# Patient Record
Sex: FEMALE | Race: WHITE | NOT HISPANIC OR LATINO
[De-identification: names, ages, dates, MRNs, and addresses within clinical notes are randomized per-mention and may not be internally consistent; named-entity substitution may affect disease eponyms.]

---

## 2017-01-11 ENCOUNTER — APPOINTMENT (OUTPATIENT)
Dept: RHEUMATOLOGY | Facility: CLINIC | Age: 82
End: 2017-01-11

## 2017-05-22 ENCOUNTER — APPOINTMENT (OUTPATIENT)
Dept: ORTHOPEDIC SURGERY | Facility: CLINIC | Age: 82
End: 2017-05-22

## 2017-05-22 VITALS
DIASTOLIC BLOOD PRESSURE: 73 MMHG | BODY MASS INDEX: 20.22 KG/M2 | HEIGHT: 60 IN | WEIGHT: 103 LBS | HEART RATE: 76 BPM | SYSTOLIC BLOOD PRESSURE: 198 MMHG

## 2017-10-05 ENCOUNTER — APPOINTMENT (OUTPATIENT)
Dept: ORTHOPEDIC SURGERY | Facility: CLINIC | Age: 82
End: 2017-10-05
Payer: MEDICARE

## 2017-10-05 VITALS
DIASTOLIC BLOOD PRESSURE: 80 MMHG | HEIGHT: 60 IN | BODY MASS INDEX: 20.62 KG/M2 | SYSTOLIC BLOOD PRESSURE: 147 MMHG | WEIGHT: 105 LBS | HEART RATE: 69 BPM

## 2017-10-05 DIAGNOSIS — Z78.9 OTHER SPECIFIED HEALTH STATUS: ICD-10-CM

## 2017-10-05 DIAGNOSIS — Z80.9 FAMILY HISTORY OF MALIGNANT NEOPLASM, UNSPECIFIED: ICD-10-CM

## 2017-10-05 DIAGNOSIS — Z87.39 PERSONAL HISTORY OF OTHER DISEASES OF THE MUSCULOSKELETAL SYSTEM AND CONNECTIVE TISSUE: ICD-10-CM

## 2017-10-05 DIAGNOSIS — Z87.891 PERSONAL HISTORY OF NICOTINE DEPENDENCE: ICD-10-CM

## 2017-10-05 DIAGNOSIS — M25.552 PAIN IN LEFT HIP: ICD-10-CM

## 2017-10-05 DIAGNOSIS — Z80.0 FAMILY HISTORY OF MALIGNANT NEOPLASM OF DIGESTIVE ORGANS: ICD-10-CM

## 2017-10-05 DIAGNOSIS — Z60.2 PROBLEMS RELATED TO LIVING ALONE: ICD-10-CM

## 2017-10-05 DIAGNOSIS — Z86.39 PERSONAL HISTORY OF OTHER ENDOCRINE, NUTRITIONAL AND METABOLIC DISEASE: ICD-10-CM

## 2017-10-05 DIAGNOSIS — Z82.62 FAMILY HISTORY OF OSTEOPOROSIS: ICD-10-CM

## 2017-10-05 DIAGNOSIS — Z86.79 PERSONAL HISTORY OF OTHER DISEASES OF THE CIRCULATORY SYSTEM: ICD-10-CM

## 2017-10-05 DIAGNOSIS — Z82.61 FAMILY HISTORY OF ARTHRITIS: ICD-10-CM

## 2017-10-05 DIAGNOSIS — Z86.69 PERSONAL HISTORY OF OTHER DISEASES OF THE NERVOUS SYSTEM AND SENSE ORGANS: ICD-10-CM

## 2017-10-05 DIAGNOSIS — M51.36 OTHER INTERVERTEBRAL DISC DEGENERATION, LUMBAR REGION: ICD-10-CM

## 2017-10-05 PROCEDURE — 73502 X-RAY EXAM HIP UNI 2-3 VIEWS: CPT | Mod: LT

## 2017-10-05 PROCEDURE — 72100 X-RAY EXAM L-S SPINE 2/3 VWS: CPT

## 2017-10-05 PROCEDURE — 99214 OFFICE O/P EST MOD 30 MIN: CPT

## 2017-10-05 RX ORDER — ESZOPICLONE 1 MG/1
1 TABLET, FILM COATED ORAL
Qty: 60 | Refills: 0 | Status: ACTIVE | COMMUNITY
Start: 2017-09-25

## 2017-10-05 RX ORDER — AZELASTINE HYDROCHLORIDE 137 UG/1
0.1 SPRAY, METERED NASAL
Qty: 30 | Refills: 0 | Status: ACTIVE | COMMUNITY
Start: 2017-06-17

## 2017-10-05 RX ORDER — MUPIROCIN 20 MG/G
2 OINTMENT TOPICAL
Qty: 22 | Refills: 0 | Status: ACTIVE | COMMUNITY
Start: 2017-09-05

## 2017-10-05 RX ORDER — TRAMADOL HYDROCHLORIDE AND ACETAMINOPHEN 37.5; 325 MG/1; MG/1
37.5-325 TABLET, FILM COATED ORAL
Qty: 60 | Refills: 0 | Status: ACTIVE | COMMUNITY
Start: 2017-10-05 | End: 1900-01-01

## 2017-10-05 RX ORDER — HYDROCORTISONE 1 %
12 CREAM (GRAM) TOPICAL
Qty: 400 | Refills: 0 | Status: ACTIVE | COMMUNITY
Start: 2017-08-15

## 2017-10-05 RX ORDER — METHENAMINE, SODIUM PHOSPHATE, MONOBASIC, ANHYDROUS, PHENYL SALICYLATE, METHYLENE BLUE AND HYOSCYAMINE SULFATE 118; 40.8; 36; 10; .12 MG/1; MG/1; MG/1; MG/1; MG/1
118 CAPSULE ORAL
Qty: 90 | Refills: 0 | Status: ACTIVE | COMMUNITY
Start: 2017-07-31

## 2017-10-05 RX ORDER — RABEPRAZOLE SODIUM 20 MG/1
20 TABLET, DELAYED RELEASE ORAL
Qty: 30 | Refills: 0 | Status: ACTIVE | COMMUNITY
Start: 2017-08-29

## 2017-10-05 SDOH — SOCIAL STABILITY - SOCIAL INSECURITY: PROBLEMS RELATED TO LIVING ALONE: Z60.2

## 2017-11-13 ENCOUNTER — APPOINTMENT (OUTPATIENT)
Dept: ORTHOPEDIC SURGERY | Facility: CLINIC | Age: 82
End: 2017-11-13
Payer: MEDICARE

## 2017-11-13 DIAGNOSIS — M54.5 LOW BACK PAIN: ICD-10-CM

## 2017-11-13 DIAGNOSIS — S22.000A WEDGE COMPRESSION FRACTURE OF UNSPECIFIED THORACIC VERTEBRA, INITIAL ENCOUNTER FOR CLOSED FRACTURE: ICD-10-CM

## 2017-11-13 PROCEDURE — 99214 OFFICE O/P EST MOD 30 MIN: CPT

## 2017-11-13 PROCEDURE — 72070 X-RAY EXAM THORAC SPINE 2VWS: CPT

## 2017-12-11 ENCOUNTER — APPOINTMENT (OUTPATIENT)
Dept: ORTHOPEDIC SURGERY | Facility: CLINIC | Age: 82
End: 2017-12-11
Payer: MEDICARE

## 2017-12-11 VITALS — WEIGHT: 105 LBS | BODY MASS INDEX: 20.62 KG/M2 | HEIGHT: 60 IN

## 2017-12-11 DIAGNOSIS — S32.009A UNSPECIFIED FRACTURE OF UNSPECIFIED LUMBAR VERTEBRA, INITIAL ENCOUNTER FOR CLOSED FRACTURE: ICD-10-CM

## 2017-12-11 PROCEDURE — 99213 OFFICE O/P EST LOW 20 MIN: CPT

## 2017-12-11 PROCEDURE — 72080 X-RAY EXAM THORACOLMB 2/> VW: CPT

## 2018-05-29 ENCOUNTER — OUTPATIENT (OUTPATIENT)
Dept: OUTPATIENT SERVICES | Facility: HOSPITAL | Age: 83
LOS: 1 days | End: 2018-05-29

## 2018-05-29 VITALS
OXYGEN SATURATION: 98 % | HEART RATE: 72 BPM | WEIGHT: 108.91 LBS | SYSTOLIC BLOOD PRESSURE: 122 MMHG | DIASTOLIC BLOOD PRESSURE: 80 MMHG | HEIGHT: 57 IN | RESPIRATION RATE: 16 BRPM | TEMPERATURE: 98 F

## 2018-05-29 DIAGNOSIS — G56.02 CARPAL TUNNEL SYNDROME, LEFT UPPER LIMB: ICD-10-CM

## 2018-05-29 DIAGNOSIS — M65.342 TRIGGER FINGER, LEFT RING FINGER: ICD-10-CM

## 2018-05-29 DIAGNOSIS — Z90.89 ACQUIRED ABSENCE OF OTHER ORGANS: Chronic | ICD-10-CM

## 2018-05-29 DIAGNOSIS — I10 ESSENTIAL (PRIMARY) HYPERTENSION: ICD-10-CM

## 2018-05-29 DIAGNOSIS — Z98.890 OTHER SPECIFIED POSTPROCEDURAL STATES: Chronic | ICD-10-CM

## 2018-05-29 LAB
BUN SERPL-MCNC: 24 MG/DL — HIGH (ref 7–23)
CALCIUM SERPL-MCNC: 9.4 MG/DL — SIGNIFICANT CHANGE UP (ref 8.4–10.5)
CHLORIDE SERPL-SCNC: 104 MMOL/L — SIGNIFICANT CHANGE UP (ref 98–107)
CO2 SERPL-SCNC: 23 MMOL/L — SIGNIFICANT CHANGE UP (ref 22–31)
CREAT SERPL-MCNC: 1.25 MG/DL — SIGNIFICANT CHANGE UP (ref 0.5–1.3)
GLUCOSE SERPL-MCNC: 81 MG/DL — SIGNIFICANT CHANGE UP (ref 70–99)
HCT VFR BLD CALC: 33.1 % — LOW (ref 34.5–45)
HGB BLD-MCNC: 10.6 G/DL — LOW (ref 11.5–15.5)
MCHC RBC-ENTMCNC: 30 PG — SIGNIFICANT CHANGE UP (ref 27–34)
MCHC RBC-ENTMCNC: 32 % — SIGNIFICANT CHANGE UP (ref 32–36)
MCV RBC AUTO: 93.8 FL — SIGNIFICANT CHANGE UP (ref 80–100)
NRBC # FLD: 0 — SIGNIFICANT CHANGE UP
PLATELET # BLD AUTO: 367 K/UL — SIGNIFICANT CHANGE UP (ref 150–400)
PMV BLD: 11.1 FL — SIGNIFICANT CHANGE UP (ref 7–13)
POTASSIUM SERPL-MCNC: 5 MMOL/L — SIGNIFICANT CHANGE UP (ref 3.5–5.3)
POTASSIUM SERPL-SCNC: 5 MMOL/L — SIGNIFICANT CHANGE UP (ref 3.5–5.3)
RBC # BLD: 3.53 M/UL — LOW (ref 3.8–5.2)
RBC # FLD: 12.6 % — SIGNIFICANT CHANGE UP (ref 10.3–14.5)
SODIUM SERPL-SCNC: 141 MMOL/L — SIGNIFICANT CHANGE UP (ref 135–145)
WBC # BLD: 6.75 K/UL — SIGNIFICANT CHANGE UP (ref 3.8–10.5)
WBC # FLD AUTO: 6.75 K/UL — SIGNIFICANT CHANGE UP (ref 3.8–10.5)

## 2018-05-29 NOTE — H&P PST ADULT - NEGATIVE BREAST SYMPTOMS
no nipple discharge R/no breast tenderness R/no nipple discharge L/no breast tenderness L/no breast lump R/no breast lump L

## 2018-05-29 NOTE — H&P PST ADULT - NEGATIVE OPHTHALMOLOGIC SYMPTOMS
no diplopia/no photophobia/no pain R/no blurred vision R/no blurred vision L/no pain L/no loss of vision L/no loss of vision R

## 2018-05-29 NOTE — H&P PST ADULT - GENERAL COMMENTS
Treated with Tamiflu for Influenza B three weeks ago. Pt denies presence of fever with flu diagnosis Treated with Tamiflu for Influenza B three weeks ago. Pt denies presence of fever with flu diagnosis.

## 2018-05-29 NOTE — H&P PST ADULT - NEGATIVE CARDIOVASCULAR SYMPTOMS
no palpitations/no chest pain/no peripheral edema/no paroxysmal nocturnal dyspnea/no dyspnea on exertion

## 2018-05-29 NOTE — H&P PST ADULT - NSANTHOSAYNRD_GEN_A_CORE
No. SERGIO screening performed.  STOP BANG Legend: 0-2 = LOW Risk; 3-4 = INTERMEDIATE Risk; 5-8 = HIGH Risk

## 2018-05-29 NOTE — H&P PST ADULT - MUSCULOSKELETAL
details… detailed exam decreased ROM due to pain/diminished strength/of upper extremities/no calf tenderness

## 2018-05-29 NOTE — H&P PST ADULT - PROBLEM SELECTOR PLAN 1
Scheduled for Left Endoscopic Carpal Tunnel Release and Thumb and Ring Finger Trigger Release on 6/4/2018  Preop instructions given, pt verbalized understanding   Chlorhexidine wash provided  Pt can take prescribed GI prophylaxis Nexium AM of surgery with a sip of water

## 2018-05-29 NOTE — H&P PST ADULT - NEGATIVE NEUROLOGICAL SYMPTOMS
no syncope/no vertigo/no focal seizures/no loss of sensation/no headache/no confusion/no difficulty walking/no loss of consciousness/no paresthesias/no hemiparesis/no facial palsy/no transient paralysis/no weakness/no tremors/no generalized seizures

## 2018-05-29 NOTE — H&P PST ADULT - PMH
Arthritis  generalized, all joints  Carpal tunnel syndrome, left upper limb    Environmental allergies    HLD (hyperlipidemia)    HTN (hypertension)    Muscle cramp    Neuropathy    Trigger finger of left thumb    Trigger finger, left ring finger

## 2018-05-29 NOTE — H&P PST ADULT - LYMPHATIC
posterior cervical L/supraclavicular L/supraclavicular R/posterior cervical R/anterior cervical R/anterior cervical L

## 2018-05-29 NOTE — H&P PST ADULT - NEGATIVE ENMT SYMPTOMS
"I have an itch inside my ear"/no sinus symptoms/no hearing difficulty/no vertigo/no ear pain/no tinnitus

## 2018-05-29 NOTE — H&P PST ADULT - FAMILY HISTORY
Child  Still living? Unknown  Family history of lymphoma, Age at diagnosis: Age Unknown     Mother  Still living? Unknown  Family history of colon cancer in mother, Age at diagnosis: Age Unknown

## 2018-05-29 NOTE — H&P PST ADULT - NEGATIVE GENERAL SYMPTOMS
no sweating/no weight gain/no polyuria/no chills/no fatigue/no weight loss/no polyphagia/no polydipsia/no fever

## 2018-05-29 NOTE — H&P PST ADULT - RS GEN PE MLT RESP DETAILS PC
respirations non-labored/good air movement/breath sounds equal/airway patent/no chest wall tenderness/clear to auscultation bilaterally

## 2018-05-29 NOTE — H&P PST ADULT - HISTORY OF PRESENT ILLNESS
89 y/o female with PMH of HTN and HLD presents to PST for preoperative evaluation with dx of carpal tunnel syndrome of left upper limb and trigger finger of left ring finger and thumb. Pt reports since left wrist fracture in 2016, she developed tingling to the fingers of her left hand. Scheduled for Left Endoscopic Carpal Tunnel Release and Thumb and Ring Finger Trigger Release on 6/4/2018. Pt reports progressive pain and tingling to fingers that radiates down to the palm. Pt states pain affects ability to perform certain activities (opening doors, jars) 89 y/o female with PMH of HTN and HLD presents to PST for preoperative evaluation with dx of carpal tunnel syndrome of left upper limb and trigger finger of left ring finger and thumb. Pt reports since left wrist fracture in 2016, she developed tingling to the fingers of her left hand. Scheduled for Left Endoscopic Carpal Tunnel Release and Thumb and Ring Finger Trigger Release on 6/4/2018. Pt reports progressive pain and tingling to fingers that radiates down the palm. She states pain affects ability to perform certain activities (opening doors, jars).

## 2018-06-01 ENCOUNTER — APPOINTMENT (OUTPATIENT)
Dept: ORTHOPEDIC SURGERY | Facility: CLINIC | Age: 83
End: 2018-06-01
Payer: MEDICARE

## 2018-06-01 DIAGNOSIS — S22.009A UNSPECIFIED FRACTURE OF UNSPECIFIED THORACIC VERTEBRA, INITIAL ENCOUNTER FOR CLOSED FRACTURE: ICD-10-CM

## 2018-06-01 PROCEDURE — 99214 OFFICE O/P EST MOD 30 MIN: CPT

## 2018-06-01 PROCEDURE — 72070 X-RAY EXAM THORAC SPINE 2VWS: CPT

## 2018-06-03 ENCOUNTER — TRANSCRIPTION ENCOUNTER (OUTPATIENT)
Age: 83
End: 2018-06-03

## 2018-06-04 ENCOUNTER — OUTPATIENT (OUTPATIENT)
Dept: OUTPATIENT SERVICES | Facility: HOSPITAL | Age: 83
LOS: 1 days | Discharge: HOME | End: 2018-06-04

## 2018-06-04 VITALS
DIASTOLIC BLOOD PRESSURE: 70 MMHG | SYSTOLIC BLOOD PRESSURE: 170 MMHG | TEMPERATURE: 98 F | RESPIRATION RATE: 16 BRPM | HEART RATE: 65 BPM | OXYGEN SATURATION: 98 %

## 2018-06-04 VITALS
SYSTOLIC BLOOD PRESSURE: 212 MMHG | OXYGEN SATURATION: 100 % | WEIGHT: 108.91 LBS | HEIGHT: 57 IN | TEMPERATURE: 98 F | HEART RATE: 74 BPM | RESPIRATION RATE: 16 BRPM | DIASTOLIC BLOOD PRESSURE: 70 MMHG

## 2018-06-04 DIAGNOSIS — Z98.890 OTHER SPECIFIED POSTPROCEDURAL STATES: Chronic | ICD-10-CM

## 2018-06-04 DIAGNOSIS — Z90.89 ACQUIRED ABSENCE OF OTHER ORGANS: Chronic | ICD-10-CM

## 2018-06-04 DIAGNOSIS — M65.342 TRIGGER FINGER, LEFT RING FINGER: ICD-10-CM

## 2018-06-04 NOTE — BRIEF OPERATIVE NOTE - PROCEDURE
<<-----Click on this checkbox to enter Procedure Trigger finger release of left hand  06/04/2018  THUMB AND RING FINGER  Active  RROSSI  Carpal tunnel release, endoscopic  06/04/2018  LEFT  Active  RROSSI

## 2018-06-04 NOTE — ASU DISCHARGE PLAN (ADULT/PEDIATRIC). - NOTIFY
Fever greater than 101/Inability to Tolerate Liquids or Foods/Numbness, tingling/Excessive Diarrhea/Pain not relieved by Medications/Unable to Urinate/Numbness, color, or temperature change to extremity/Persistent Nausea and Vomiting/Swelling that continues/Bleeding that does not stop

## 2018-06-04 NOTE — BRIEF OPERATIVE NOTE - PRE-OP DX
Carpal tunnel syndrome on left  06/04/2018    Active  Austyn Cortez  Trigger finger of left thumb  06/04/2018    Active  Austyn Cortez  Trigger finger, left ring finger  06/04/2018    Active  Austyn Cortez

## 2018-07-17 PROBLEM — M19.90 UNSPECIFIED OSTEOARTHRITIS, UNSPECIFIED SITE: Chronic | Status: ACTIVE | Noted: 2018-05-29

## 2018-07-20 PROBLEM — G62.9 POLYNEUROPATHY, UNSPECIFIED: Chronic | Status: ACTIVE | Noted: 2018-05-29

## 2018-07-20 PROBLEM — I10 ESSENTIAL (PRIMARY) HYPERTENSION: Chronic | Status: ACTIVE | Noted: 2018-05-29

## 2018-07-20 PROBLEM — M65.312 TRIGGER THUMB, LEFT THUMB: Chronic | Status: ACTIVE | Noted: 2018-05-29

## 2018-07-20 PROBLEM — R25.2 CRAMP AND SPASM: Chronic | Status: ACTIVE | Noted: 2018-05-29

## 2018-07-20 PROBLEM — G56.02 CARPAL TUNNEL SYNDROME, LEFT UPPER LIMB: Chronic | Status: ACTIVE | Noted: 2018-05-29

## 2018-07-20 PROBLEM — E78.5 HYPERLIPIDEMIA, UNSPECIFIED: Chronic | Status: ACTIVE | Noted: 2018-05-29

## 2018-07-20 PROBLEM — M65.342 TRIGGER FINGER, LEFT RING FINGER: Chronic | Status: ACTIVE | Noted: 2018-05-29

## 2018-07-20 PROBLEM — Z91.09 OTHER ALLERGY STATUS, OTHER THAN TO DRUGS AND BIOLOGICAL SUBSTANCES: Chronic | Status: ACTIVE | Noted: 2018-05-29

## 2018-07-22 PROBLEM — M25.552 PAIN IN JOINT INVOLVING PELVIC REGION AND THIGH, LEFT: Status: ACTIVE | Noted: 2017-10-05

## 2018-07-22 PROBLEM — Z80.0 FAMILY HISTORY OF COLON CANCER: Status: ACTIVE | Noted: 2017-10-05

## 2018-08-20 ENCOUNTER — APPOINTMENT (OUTPATIENT)
Dept: DERMATOLOGY | Facility: CLINIC | Age: 83
End: 2018-08-20
Payer: MEDICARE

## 2018-08-20 VITALS
HEIGHT: 56 IN | SYSTOLIC BLOOD PRESSURE: 122 MMHG | DIASTOLIC BLOOD PRESSURE: 80 MMHG | WEIGHT: 106 LBS | BODY MASS INDEX: 23.84 KG/M2

## 2018-08-20 DIAGNOSIS — L81.4 OTHER MELANIN HYPERPIGMENTATION: ICD-10-CM

## 2018-08-20 DIAGNOSIS — Z12.83 ENCOUNTER FOR SCREENING FOR MALIGNANT NEOPLASM OF SKIN: ICD-10-CM

## 2018-08-20 DIAGNOSIS — D18.01 HEMANGIOMA OF SKIN AND SUBCUTANEOUS TISSUE: ICD-10-CM

## 2018-08-20 DIAGNOSIS — Z80.9 FAMILY HISTORY OF MALIGNANT NEOPLASM, UNSPECIFIED: ICD-10-CM

## 2018-08-20 DIAGNOSIS — L82.1 OTHER SEBORRHEIC KERATOSIS: ICD-10-CM

## 2018-08-20 DIAGNOSIS — Z91.89 OTHER SPECIFIED PERSONAL RISK FACTORS, NOT ELSEWHERE CLASSIFIED: ICD-10-CM

## 2018-08-20 PROCEDURE — 17110 DESTRUCTION B9 LES UP TO 14: CPT

## 2018-08-20 PROCEDURE — 99203 OFFICE O/P NEW LOW 30 MIN: CPT | Mod: 25

## 2018-12-27 NOTE — H&P PST ADULT - REASON FOR ADMISSION
"Patient presents to the clinic for follow up with ongoing hypertension concerns.      Previous A1C is at goal of <8  Lab Results   Component Value Date    A1C 7.5 11/21/2018    A1C 8.0 08/09/2018    A1C 7.8 05/03/2018     Urine microalbumin:creatine: n/a  Foot exam unknown-declines today  Eye exam 2/2018    Tobacco User no  Patient is on a daily aspirin  Patient is on a Statin.  Blood pressure today of:     BP Readings from Last 1 Encounters:   12/27/18 162/74      is not at the goal of <139/89 for diabetics.    Chief Complaint   Patient presents with     Clinic Care Coordination - Follow-up       Initial /74 (BP Location: Right arm, Patient Position: Sitting, Cuff Size: Adult Regular)   Pulse 72   Temp 98.2  F (36.8  C) (Tympanic)   Wt 91.2 kg (201 lb)   BMI 29.47 kg/m   Estimated body mass index is 29.47 kg/m  as calculated from the following:    Height as of 9/19/17: 1.759 m (5' 9.25\").    Weight as of this encounter: 91.2 kg (201 lb).  Medication Reconciliation: complete    Barbara Connor LPN    "
"I'm having carpal tunnel release of my left hand"

## 2019-10-02 PROBLEM — Z60.2 PERSON LIVING ALONE: Status: ACTIVE | Noted: 2017-05-22

## 2020-04-24 ENCOUNTER — NON-APPOINTMENT (OUTPATIENT)
Age: 85
End: 2020-04-24

## 2020-04-24 ENCOUNTER — APPOINTMENT (OUTPATIENT)
Dept: OPHTHALMOLOGY | Facility: CLINIC | Age: 85
End: 2020-04-24
Payer: MEDICARE

## 2020-04-24 PROCEDURE — 99441: CPT | Mod: CR

## 2022-01-15 ENCOUNTER — INPATIENT (INPATIENT)
Facility: HOSPITAL | Age: 87
LOS: 3 days | Discharge: HOME | End: 2022-01-19
Attending: STUDENT IN AN ORGANIZED HEALTH CARE EDUCATION/TRAINING PROGRAM | Admitting: STUDENT IN AN ORGANIZED HEALTH CARE EDUCATION/TRAINING PROGRAM
Payer: MEDICARE

## 2022-01-15 VITALS
TEMPERATURE: 98 F | DIASTOLIC BLOOD PRESSURE: 71 MMHG | HEIGHT: 57 IN | HEART RATE: 80 BPM | RESPIRATION RATE: 18 BRPM | SYSTOLIC BLOOD PRESSURE: 170 MMHG | OXYGEN SATURATION: 97 %

## 2022-01-15 DIAGNOSIS — Z98.890 OTHER SPECIFIED POSTPROCEDURAL STATES: Chronic | ICD-10-CM

## 2022-01-15 DIAGNOSIS — Z90.89 ACQUIRED ABSENCE OF OTHER ORGANS: Chronic | ICD-10-CM

## 2022-01-15 LAB
ALBUMIN SERPL ELPH-MCNC: 4.1 G/DL — SIGNIFICANT CHANGE UP (ref 3.3–5)
ALP SERPL-CCNC: 196 U/L — HIGH (ref 40–120)
ALT FLD-CCNC: 897 U/L — HIGH (ref 4–33)
ANION GAP SERPL CALC-SCNC: 16 MMOL/L — HIGH (ref 7–14)
AST SERPL-CCNC: 733 U/L — HIGH (ref 4–32)
BASOPHILS # BLD AUTO: 0.03 K/UL — SIGNIFICANT CHANGE UP (ref 0–0.2)
BASOPHILS NFR BLD AUTO: 0.2 % — SIGNIFICANT CHANGE UP (ref 0–2)
BILIRUB SERPL-MCNC: 2.2 MG/DL — HIGH (ref 0.2–1.2)
BUN SERPL-MCNC: 28 MG/DL — HIGH (ref 7–23)
CALCIUM SERPL-MCNC: 8.8 MG/DL — SIGNIFICANT CHANGE UP (ref 8.4–10.5)
CHLORIDE SERPL-SCNC: 95 MMOL/L — LOW (ref 98–107)
CO2 SERPL-SCNC: 19 MMOL/L — LOW (ref 22–31)
CREAT SERPL-MCNC: 1.52 MG/DL — HIGH (ref 0.5–1.3)
EOSINOPHIL # BLD AUTO: 0 K/UL — SIGNIFICANT CHANGE UP (ref 0–0.5)
EOSINOPHIL NFR BLD AUTO: 0 % — SIGNIFICANT CHANGE UP (ref 0–6)
GLUCOSE SERPL-MCNC: 148 MG/DL — HIGH (ref 70–99)
HCT VFR BLD CALC: 27.1 % — LOW (ref 34.5–45)
HGB BLD-MCNC: 9 G/DL — LOW (ref 11.5–15.5)
IANC: 14.41 K/UL — HIGH (ref 1.5–8.5)
IMM GRANULOCYTES NFR BLD AUTO: 0.7 % — SIGNIFICANT CHANGE UP (ref 0–1.5)
LACTATE BLDV-MCNC: 1.6 MMOL/L — SIGNIFICANT CHANGE UP (ref 0.5–2)
LYMPHOCYTES # BLD AUTO: 0.64 K/UL — LOW (ref 1–3.3)
LYMPHOCYTES # BLD AUTO: 4 % — LOW (ref 13–44)
MAGNESIUM SERPL-MCNC: 1.7 MG/DL — SIGNIFICANT CHANGE UP (ref 1.6–2.6)
MCHC RBC-ENTMCNC: 30.5 PG — SIGNIFICANT CHANGE UP (ref 27–34)
MCHC RBC-ENTMCNC: 33.2 GM/DL — SIGNIFICANT CHANGE UP (ref 32–36)
MCV RBC AUTO: 91.9 FL — SIGNIFICANT CHANGE UP (ref 80–100)
MONOCYTES # BLD AUTO: 0.89 K/UL — SIGNIFICANT CHANGE UP (ref 0–0.9)
MONOCYTES NFR BLD AUTO: 5.5 % — SIGNIFICANT CHANGE UP (ref 2–14)
NEUTROPHILS # BLD AUTO: 14.41 K/UL — HIGH (ref 1.8–7.4)
NEUTROPHILS NFR BLD AUTO: 89.6 % — HIGH (ref 43–77)
NRBC # BLD: 0 /100 WBCS — SIGNIFICANT CHANGE UP
NRBC # FLD: 0 K/UL — SIGNIFICANT CHANGE UP
NT-PROBNP SERPL-SCNC: HIGH PG/ML
PHOSPHATE SERPL-MCNC: 3.1 MG/DL — SIGNIFICANT CHANGE UP (ref 2.5–4.5)
PLATELET # BLD AUTO: 218 K/UL — SIGNIFICANT CHANGE UP (ref 150–400)
POTASSIUM SERPL-MCNC: 4.3 MMOL/L — SIGNIFICANT CHANGE UP (ref 3.5–5.3)
POTASSIUM SERPL-SCNC: 4.3 MMOL/L — SIGNIFICANT CHANGE UP (ref 3.5–5.3)
PROT SERPL-MCNC: 6.5 G/DL — SIGNIFICANT CHANGE UP (ref 6–8.3)
RBC # BLD: 2.95 M/UL — LOW (ref 3.8–5.2)
RBC # FLD: 12.8 % — SIGNIFICANT CHANGE UP (ref 10.3–14.5)
SODIUM SERPL-SCNC: 130 MMOL/L — LOW (ref 135–145)
TROPONIN T, HIGH SENSITIVITY RESULT: 188 NG/L — CRITICAL HIGH
WBC # BLD: 16.09 K/UL — HIGH (ref 3.8–10.5)
WBC # FLD AUTO: 16.09 K/UL — HIGH (ref 3.8–10.5)

## 2022-01-15 PROCEDURE — 76705 ECHO EXAM OF ABDOMEN: CPT | Mod: 26

## 2022-01-15 PROCEDURE — 72125 CT NECK SPINE W/O DYE: CPT | Mod: 26,MA

## 2022-01-15 PROCEDURE — 72128 CT CHEST SPINE W/O DYE: CPT | Mod: 26,MA

## 2022-01-15 PROCEDURE — 74176 CT ABD & PELVIS W/O CONTRAST: CPT | Mod: 26,MD

## 2022-01-15 PROCEDURE — 70450 CT HEAD/BRAIN W/O DYE: CPT | Mod: 26,MA

## 2022-01-15 PROCEDURE — 93010 ELECTROCARDIOGRAM REPORT: CPT | Mod: GC

## 2022-01-15 PROCEDURE — 71045 X-RAY EXAM CHEST 1 VIEW: CPT | Mod: 26

## 2022-01-15 PROCEDURE — 99285 EMERGENCY DEPT VISIT HI MDM: CPT | Mod: 25,GC

## 2022-01-15 RX ORDER — PIPERACILLIN AND TAZOBACTAM 4; .5 G/20ML; G/20ML
3.38 INJECTION, POWDER, LYOPHILIZED, FOR SOLUTION INTRAVENOUS ONCE
Refills: 0 | Status: COMPLETED | OUTPATIENT
Start: 2022-01-15 | End: 2022-01-15

## 2022-01-15 RX ORDER — VANCOMYCIN HCL 1 G
1000 VIAL (EA) INTRAVENOUS ONCE
Refills: 0 | Status: COMPLETED | OUTPATIENT
Start: 2022-01-15 | End: 2022-01-15

## 2022-01-15 RX ORDER — SODIUM CHLORIDE 9 MG/ML
1000 INJECTION INTRAMUSCULAR; INTRAVENOUS; SUBCUTANEOUS ONCE
Refills: 0 | Status: COMPLETED | OUTPATIENT
Start: 2022-01-15 | End: 2022-01-15

## 2022-01-15 RX ORDER — ACETAMINOPHEN 500 MG
650 TABLET ORAL ONCE
Refills: 0 | Status: COMPLETED | OUTPATIENT
Start: 2022-01-15 | End: 2022-01-15

## 2022-01-15 RX ADMIN — SODIUM CHLORIDE 1000 MILLILITER(S): 9 INJECTION INTRAMUSCULAR; INTRAVENOUS; SUBCUTANEOUS at 22:14

## 2022-01-15 RX ADMIN — Medication 250 MILLIGRAM(S): at 23:00

## 2022-01-15 RX ADMIN — Medication 650 MILLIGRAM(S): at 22:25

## 2022-01-15 RX ADMIN — SODIUM CHLORIDE 1000 MILLILITER(S): 9 INJECTION INTRAMUSCULAR; INTRAVENOUS; SUBCUTANEOUS at 23:42

## 2022-01-15 RX ADMIN — PIPERACILLIN AND TAZOBACTAM 200 GRAM(S): 4; .5 INJECTION, POWDER, LYOPHILIZED, FOR SOLUTION INTRAVENOUS at 22:25

## 2022-01-15 NOTE — ED PROVIDER NOTE - ATTENDING CONTRIBUTION TO CARE
89F w h/o HTN p/w nausea, fatigue, decreased PO intake, worsening over the past week. States symptoms the day after she bumped her head on a cabinet. Denies numbness, focal weakness, vision changes, vomiting, fevers, cough, dysuria, chest pain. Endorses frequency. Denies sick contacts or uri symptoms.     Except as documented in the HPI,  all other systems are negative    CONSTITUTIONAL: NAD, awake, alert  HEAD: Normocephalic; atraumatic  ENMT: External appears normal, MMM  NECK: no tenderness, FROM  CARD: Normal Sl, S2; no audible murmurs  RESP: normal wob, lungs ctab  ABD: soft, non-distended; non-tender, left cva tenderness  MSK: no spinal tenderness, no step off  edema, normal ROM in all four extremities  SKIN: Warm, dry, no rashes  NEURO: aaox3, moving all extremities spontaneously, 5/5 strength throughout, sensation grossly intact, no drift, no droop    89F w/ 1 week of fatigue, frequency, decreased PO intake, low suspicion for intracranial injury but given chronicity w/ symptoms will obtain head ct, will check tsh, conduct infectious symptoms, reassess

## 2022-01-15 NOTE — ED ADULT TRIAGE NOTE - CHIEF COMPLAINT QUOTE
pt c/o n/v since heading head last Saturday. Pt denies any associated symptoms including weakness, vision changes, chest/abdominal/head pain. Denies blood thinner use. Received 4 mg zofran with EMS, arrives with 20 G PIV L AC. pt c/o n/v since heading head on door when standing last Saturday, denies falling. Pt denies any associated symptoms including weakness, dizziness, vision changes, chest/abdominal/head pain. Denies blood thinner use. Received 4 mg zofran with EMS, arrives with 20 G PIV L AC. pt c/o n/v since hitting head on door when standing last Saturday, denies falling. Pt denies any associated symptoms including weakness, dizziness, vision changes, chest/abdominal/head pain. Denies blood thinner use. Received 4 mg zofran with EMS, arrives with 20 G PIV L AC.

## 2022-01-15 NOTE — ED PROVIDER NOTE - CARE PLAN
1 Principal Discharge DX:	Acute cystitis  Secondary Diagnosis:	DIMITRI (acute kidney injury)   Principal Discharge DX:	Acute cystitis  Secondary Diagnosis:	DIMITRI (acute kidney injury)  Secondary Diagnosis:	Rhabdomyolysis  Secondary Diagnosis:	Cyst of left kidney  Secondary Diagnosis:	Transaminitis

## 2022-01-15 NOTE — ED PROVIDER NOTE - PROGRESS NOTE DETAILS
Carlota Banks MD, PGY-2: labs significant for transaminitis 700/800s, tylenol was ordered and given for fever prior to CMP result. Carlota Banks MD, PGY-2: pt clinically stable; signout given to hospitalist.

## 2022-01-15 NOTE — ED ADULT TRIAGE NOTE - HEIGHT IN CM
144.78

## 2022-01-15 NOTE — ED PROVIDER NOTE - CLINICAL SUMMARY MEDICAL DECISION MAKING FREE TEXT BOX
Carlota Banks MD, PGY-2: 89YO F hx HTN p/w nausea, weakness, recent head trauma 6d prior, decreased PO intake. VSS, PE no abnormalities on neuro exam. suspect weakness s/t dehydration, consider ACS, infection. consider intracranial hemorrhage given trauma and vomiting. plan for infectious w/u, basic labs, ct head.

## 2022-01-15 NOTE — ED PROVIDER NOTE - PHYSICAL EXAMINATION
Gen: WDWN, NAD  HEENT: EOMI, no nasal discharge, mucous membranes moist  CV: RRR, +S1/S2, no M/R/G, 2+ radial pulses b/l  Resp: CTAB, no W/R/R, no accessory muscle use, no increased work of breathing  GI: Abdomen soft non-distended, NTTP  MSK: No open wounds, no bruising, no LE edema  Neuro: A&Ox4, following commands, moving all four extremities spontaneously. CN3-12 intact, EOMI. PERRLA, 5/5 strength in b/l UE/LE.  Sensation intact bl in UE/LE.   Psych: appropriate mood

## 2022-01-15 NOTE — ED ADULT NURSE NOTE - OBJECTIVE STATEMENT
Patient received with the complaints of nausea since 7 days. Patient denies any dizziness/abdominal pain/headache/change in vision, speech or gait. Patient in no distress. Medications given as ordered. Patient tolerated well. Nursing care continues

## 2022-01-15 NOTE — ED PROVIDER NOTE - NS ED ROS FT
Gen: Denies fevers  CV: Denies chest pain  Resp: Denies SOB, cough  GI: + nausea, vomiting  : Denies dysuria  all other ROS negative unless indicated in HPI

## 2022-01-15 NOTE — ED PROVIDER NOTE - NSICDXFAMILYHX_GEN_ALL_CORE_FT
FAMILY HISTORY:  Mother  Still living? Unknown  Family history of colon cancer in mother, Age at diagnosis: Age Unknown    Child  Still living? Unknown  Family history of lymphoma, Age at diagnosis: Age Unknown

## 2022-01-15 NOTE — ED PROVIDER NOTE - OBJECTIVE STATEMENT
89YO F hx HTN p/w nausea, weakness. nausea onset this afternoon, resolved after zofran from EMS. denied abd pain. endorses episode of b/l arm and leg weakness intake this am, improved after po intake (decreased day prior). endorses trauma 6d prior, hit head on cabinet, at that time denied LOC, n/v, blurry vision, focal neuro deficits. denies fevers, cough, sob, dysuria.

## 2022-01-15 NOTE — ED PROVIDER NOTE - NSICDXPASTMEDICALHX_GEN_ALL_CORE_FT
PAST MEDICAL HISTORY:  Arthritis generalized, all joints    Carpal tunnel syndrome, left upper limb     Environmental allergies     HLD (hyperlipidemia)     HTN (hypertension)     Muscle cramp     Neuropathy     Trigger finger of left thumb     Trigger finger, left ring finger

## 2022-01-15 NOTE — ED ADULT NURSE NOTE - CHIEF COMPLAINT QUOTE
pt c/o n/v since heading head on door when standing last Saturday, denies falling. Pt denies any associated symptoms including weakness, dizziness, vision changes, chest/abdominal/head pain. Denies blood thinner use. Received 4 mg zofran with EMS, arrives with 20 G PIV L AC.

## 2022-01-16 DIAGNOSIS — M62.82 RHABDOMYOLYSIS: ICD-10-CM

## 2022-01-16 DIAGNOSIS — I10 ESSENTIAL (PRIMARY) HYPERTENSION: ICD-10-CM

## 2022-01-16 DIAGNOSIS — R77.8 OTHER SPECIFIED ABNORMALITIES OF PLASMA PROTEINS: ICD-10-CM

## 2022-01-16 DIAGNOSIS — N28.1 CYST OF KIDNEY, ACQUIRED: ICD-10-CM

## 2022-01-16 DIAGNOSIS — N30.00 ACUTE CYSTITIS WITHOUT HEMATURIA: ICD-10-CM

## 2022-01-16 DIAGNOSIS — D64.9 ANEMIA, UNSPECIFIED: ICD-10-CM

## 2022-01-16 DIAGNOSIS — N39.0 URINARY TRACT INFECTION, SITE NOT SPECIFIED: ICD-10-CM

## 2022-01-16 DIAGNOSIS — R74.01 ELEVATION OF LEVELS OF LIVER TRANSAMINASE LEVELS: ICD-10-CM

## 2022-01-16 DIAGNOSIS — Z29.9 ENCOUNTER FOR PROPHYLACTIC MEASURES, UNSPECIFIED: ICD-10-CM

## 2022-01-16 DIAGNOSIS — N17.9 ACUTE KIDNEY FAILURE, UNSPECIFIED: ICD-10-CM

## 2022-01-16 LAB
ALBUMIN SERPL ELPH-MCNC: 4 G/DL — SIGNIFICANT CHANGE UP (ref 3.3–5)
ALP SERPL-CCNC: 163 U/L — HIGH (ref 40–120)
ALT FLD-CCNC: 659 U/L — HIGH (ref 4–33)
ANION GAP SERPL CALC-SCNC: 11 MMOL/L — SIGNIFICANT CHANGE UP (ref 7–14)
ANION GAP SERPL CALC-SCNC: 14 MMOL/L — SIGNIFICANT CHANGE UP (ref 7–14)
APPEARANCE UR: CLEAR — SIGNIFICANT CHANGE UP
AST SERPL-CCNC: 405 U/L — HIGH (ref 4–32)
BACTERIA # UR AUTO: ABNORMAL
BASE EXCESS BLDV CALC-SCNC: -6.2 MMOL/L — LOW (ref -2–3)
BASOPHILS # BLD AUTO: 0.04 K/UL — SIGNIFICANT CHANGE UP (ref 0–0.2)
BASOPHILS NFR BLD AUTO: 0.3 % — SIGNIFICANT CHANGE UP (ref 0–2)
BILIRUB SERPL-MCNC: 1 MG/DL — SIGNIFICANT CHANGE UP (ref 0.2–1.2)
BILIRUB UR-MCNC: NEGATIVE — SIGNIFICANT CHANGE UP
BUN SERPL-MCNC: 22 MG/DL — SIGNIFICANT CHANGE UP (ref 7–23)
BUN SERPL-MCNC: 24 MG/DL — HIGH (ref 7–23)
CA-I SERPL-SCNC: 1.09 MMOL/L — LOW (ref 1.15–1.33)
CALCIUM SERPL-MCNC: 8 MG/DL — LOW (ref 8.4–10.5)
CALCIUM SERPL-MCNC: 8.3 MG/DL — LOW (ref 8.4–10.5)
CHLORIDE BLDV-SCNC: 103 MMOL/L — SIGNIFICANT CHANGE UP (ref 96–108)
CHLORIDE SERPL-SCNC: 101 MMOL/L — SIGNIFICANT CHANGE UP (ref 98–107)
CHLORIDE SERPL-SCNC: 103 MMOL/L — SIGNIFICANT CHANGE UP (ref 98–107)
CK MB BLD-MCNC: 0.7 % — SIGNIFICANT CHANGE UP (ref 0–2.5)
CK MB CFR SERPL CALC: 6.2 NG/ML — HIGH
CK MB CFR SERPL CALC: 7.9 NG/ML — HIGH
CK SERPL-CCNC: 1201 U/L — HIGH (ref 25–170)
CO2 BLDV-SCNC: 21.5 MMOL/L — LOW (ref 22–26)
CO2 SERPL-SCNC: 18 MMOL/L — LOW (ref 22–31)
CO2 SERPL-SCNC: 21 MMOL/L — LOW (ref 22–31)
COLOR SPEC: YELLOW — SIGNIFICANT CHANGE UP
COMMENT - URINE: SIGNIFICANT CHANGE UP
CREAT SERPL-MCNC: 1.29 MG/DL — SIGNIFICANT CHANGE UP (ref 0.5–1.3)
CREAT SERPL-MCNC: 1.45 MG/DL — HIGH (ref 0.5–1.3)
DIFF PNL FLD: ABNORMAL
EOSINOPHIL # BLD AUTO: 0.03 K/UL — SIGNIFICANT CHANGE UP (ref 0–0.5)
EOSINOPHIL NFR BLD AUTO: 0.2 % — SIGNIFICANT CHANGE UP (ref 0–6)
EPI CELLS # UR: SIGNIFICANT CHANGE UP
FERRITIN SERPL-MCNC: 348 NG/ML — HIGH (ref 15–150)
GAS PNL BLDV: 132 MMOL/L — LOW (ref 136–145)
GAS PNL BLDV: SIGNIFICANT CHANGE UP
GLUCOSE BLDV-MCNC: 119 MG/DL — HIGH (ref 70–99)
GLUCOSE SERPL-MCNC: 128 MG/DL — HIGH (ref 70–99)
GLUCOSE SERPL-MCNC: 140 MG/DL — HIGH (ref 70–99)
GLUCOSE UR QL: NEGATIVE — SIGNIFICANT CHANGE UP
HAV IGM SER-ACNC: SIGNIFICANT CHANGE UP
HBV CORE IGM SER-ACNC: SIGNIFICANT CHANGE UP
HBV SURFACE AG SER-ACNC: SIGNIFICANT CHANGE UP
HCO3 BLDV-SCNC: 20 MMOL/L — LOW (ref 22–29)
HCT VFR BLD CALC: 28 % — LOW (ref 34.5–45)
HCT VFR BLDA CALC: 28 % — LOW (ref 34.5–46.5)
HCV AB S/CO SERPL IA: 0.08 S/CO — SIGNIFICANT CHANGE UP (ref 0–0.99)
HCV AB SERPL-IMP: SIGNIFICANT CHANGE UP
HGB BLD CALC-MCNC: 9.4 G/DL — LOW (ref 11.5–15.5)
HGB BLD-MCNC: 9.1 G/DL — LOW (ref 11.5–15.5)
HYALINE CASTS # UR AUTO: 1 /LPF — SIGNIFICANT CHANGE UP (ref 0–7)
IANC: 14.3 K/UL — HIGH (ref 1.5–8.5)
IMM GRANULOCYTES NFR BLD AUTO: 0.6 % — SIGNIFICANT CHANGE UP (ref 0–1.5)
IRON SATN MFR SERPL: 18 UG/DL — LOW (ref 30–160)
IRON SATN MFR SERPL: 6 % — LOW (ref 14–50)
KETONES UR-MCNC: NEGATIVE — SIGNIFICANT CHANGE UP
LACTATE BLDV-MCNC: 1.5 MMOL/L — SIGNIFICANT CHANGE UP (ref 0.5–2)
LEUKOCYTE ESTERASE UR-ACNC: ABNORMAL
LYMPHOCYTES # BLD AUTO: 0.51 K/UL — LOW (ref 1–3.3)
LYMPHOCYTES # BLD AUTO: 3.2 % — LOW (ref 13–44)
MAGNESIUM SERPL-MCNC: 1.7 MG/DL — SIGNIFICANT CHANGE UP (ref 1.6–2.6)
MCHC RBC-ENTMCNC: 30.4 PG — SIGNIFICANT CHANGE UP (ref 27–34)
MCHC RBC-ENTMCNC: 32.5 GM/DL — SIGNIFICANT CHANGE UP (ref 32–36)
MCV RBC AUTO: 93.6 FL — SIGNIFICANT CHANGE UP (ref 80–100)
MONOCYTES # BLD AUTO: 0.73 K/UL — SIGNIFICANT CHANGE UP (ref 0–0.9)
MONOCYTES NFR BLD AUTO: 4.6 % — SIGNIFICANT CHANGE UP (ref 2–14)
NEUTROPHILS # BLD AUTO: 14.3 K/UL — HIGH (ref 1.8–7.4)
NEUTROPHILS NFR BLD AUTO: 91.1 % — HIGH (ref 43–77)
NITRITE UR-MCNC: NEGATIVE — SIGNIFICANT CHANGE UP
NRBC # BLD: 0 /100 WBCS — SIGNIFICANT CHANGE UP
NRBC # FLD: 0 K/UL — SIGNIFICANT CHANGE UP
PCO2 BLDV: 43 MMHG — HIGH (ref 39–42)
PH BLDV: 7.28 — LOW (ref 7.32–7.43)
PH UR: 6 — SIGNIFICANT CHANGE UP (ref 5–8)
PHOSPHATE SERPL-MCNC: 2.9 MG/DL — SIGNIFICANT CHANGE UP (ref 2.5–4.5)
PLATELET # BLD AUTO: 228 K/UL — SIGNIFICANT CHANGE UP (ref 150–400)
PO2 BLDV: 20 MMHG — SIGNIFICANT CHANGE UP
POTASSIUM BLDV-SCNC: 4 MMOL/L — SIGNIFICANT CHANGE UP (ref 3.5–5.1)
POTASSIUM SERPL-MCNC: 4.2 MMOL/L — SIGNIFICANT CHANGE UP (ref 3.5–5.3)
POTASSIUM SERPL-MCNC: 4.5 MMOL/L — SIGNIFICANT CHANGE UP (ref 3.5–5.3)
POTASSIUM SERPL-SCNC: 4.2 MMOL/L — SIGNIFICANT CHANGE UP (ref 3.5–5.3)
POTASSIUM SERPL-SCNC: 4.5 MMOL/L — SIGNIFICANT CHANGE UP (ref 3.5–5.3)
PROT SERPL-MCNC: 6.6 G/DL — SIGNIFICANT CHANGE UP (ref 6–8.3)
PROT UR-MCNC: ABNORMAL
RBC # BLD: 2.99 M/UL — LOW (ref 3.8–5.2)
RBC # FLD: 13.1 % — SIGNIFICANT CHANGE UP (ref 10.3–14.5)
RBC CASTS # UR COMP ASSIST: 3 /HPF — SIGNIFICANT CHANGE UP (ref 0–4)
RETICS #: 51.1 K/UL — SIGNIFICANT CHANGE UP (ref 25–125)
RETICS/RBC NFR: 1.3 % — SIGNIFICANT CHANGE UP (ref 0.5–2.5)
SAO2 % BLDV: 26.7 % — SIGNIFICANT CHANGE UP
SARS-COV-2 RNA SPEC QL NAA+PROBE: SIGNIFICANT CHANGE UP
SODIUM SERPL-SCNC: 133 MMOL/L — LOW (ref 135–145)
SODIUM SERPL-SCNC: 135 MMOL/L — SIGNIFICANT CHANGE UP (ref 135–145)
SP GR SPEC: 1.01 — SIGNIFICANT CHANGE UP (ref 1–1.05)
TIBC SERPL-MCNC: 294 UG/DL — SIGNIFICANT CHANGE UP (ref 220–430)
TROPONIN T, HIGH SENSITIVITY RESULT: 142 NG/L — CRITICAL HIGH
TROPONIN T, HIGH SENSITIVITY RESULT: 190 NG/L — CRITICAL HIGH
UIBC SERPL-MCNC: 276 UG/DL — SIGNIFICANT CHANGE UP (ref 110–370)
UROBILINOGEN FLD QL: SIGNIFICANT CHANGE UP
WBC # BLD: 15.7 K/UL — HIGH (ref 3.8–10.5)
WBC # FLD AUTO: 15.7 K/UL — HIGH (ref 3.8–10.5)
WBC UR QL: 16 /HPF — HIGH (ref 0–5)

## 2022-01-16 PROCEDURE — 99223 1ST HOSP IP/OBS HIGH 75: CPT | Mod: GC

## 2022-01-16 PROCEDURE — 76770 US EXAM ABDO BACK WALL COMP: CPT | Mod: 26

## 2022-01-16 RX ORDER — PANTOPRAZOLE SODIUM 20 MG/1
40 TABLET, DELAYED RELEASE ORAL
Refills: 0 | Status: DISCONTINUED | OUTPATIENT
Start: 2022-01-16 | End: 2022-01-19

## 2022-01-16 RX ORDER — PIPERACILLIN AND TAZOBACTAM 4; .5 G/20ML; G/20ML
3.38 INJECTION, POWDER, LYOPHILIZED, FOR SOLUTION INTRAVENOUS EVERY 12 HOURS
Refills: 0 | Status: DISCONTINUED | OUTPATIENT
Start: 2022-01-16 | End: 2022-01-19

## 2022-01-16 RX ORDER — SODIUM CHLORIDE 9 MG/ML
1000 INJECTION INTRAMUSCULAR; INTRAVENOUS; SUBCUTANEOUS
Refills: 0 | Status: DISCONTINUED | OUTPATIENT
Start: 2022-01-16 | End: 2022-01-18

## 2022-01-16 RX ORDER — HEPARIN SODIUM 5000 [USP'U]/ML
5000 INJECTION INTRAVENOUS; SUBCUTANEOUS EVERY 8 HOURS
Refills: 0 | Status: DISCONTINUED | OUTPATIENT
Start: 2022-01-16 | End: 2022-01-19

## 2022-01-16 RX ORDER — LOSARTAN POTASSIUM 100 MG/1
100 TABLET, FILM COATED ORAL DAILY
Refills: 0 | Status: DISCONTINUED | OUTPATIENT
Start: 2022-01-16 | End: 2022-01-19

## 2022-01-16 RX ORDER — CEFTRIAXONE 500 MG/1
1000 INJECTION, POWDER, FOR SOLUTION INTRAMUSCULAR; INTRAVENOUS ONCE
Refills: 0 | Status: DISCONTINUED | OUTPATIENT
Start: 2022-01-16 | End: 2022-01-16

## 2022-01-16 RX ADMIN — HEPARIN SODIUM 5000 UNIT(S): 5000 INJECTION INTRAVENOUS; SUBCUTANEOUS at 14:46

## 2022-01-16 RX ADMIN — PIPERACILLIN AND TAZOBACTAM 25 GRAM(S): 4; .5 INJECTION, POWDER, LYOPHILIZED, FOR SOLUTION INTRAVENOUS at 17:56

## 2022-01-16 RX ADMIN — SODIUM CHLORIDE 75 MILLILITER(S): 9 INJECTION INTRAMUSCULAR; INTRAVENOUS; SUBCUTANEOUS at 06:49

## 2022-01-16 RX ADMIN — HEPARIN SODIUM 5000 UNIT(S): 5000 INJECTION INTRAVENOUS; SUBCUTANEOUS at 22:01

## 2022-01-16 RX ADMIN — SODIUM CHLORIDE 75 MILLILITER(S): 9 INJECTION INTRAMUSCULAR; INTRAVENOUS; SUBCUTANEOUS at 17:55

## 2022-01-16 NOTE — H&P ADULT - PROBLEM SELECTOR PLAN 5
AST/ALT: 733/897 on arrival   pt notes taking 2 tylenols/daily for the past month   CT Abd: Multiple hepatic hypodensities are seen, measuring up to 7.1 cm, characterized as cysts on subsequent ultrasound  US: No evidence of acute cholecystitis. No intra or extrahepatic biliary ductal dilatation.    - continue to monitor AST/ALT  - f/u hepatic function panel

## 2022-01-16 NOTE — PATIENT PROFILE ADULT - FALL HARM RISK - HARM RISK INTERVENTIONS

## 2022-01-16 NOTE — H&P ADULT - PROBLEM SELECTOR PLAN 2
Cr: 1.5 on arrival with unknown baseline  likely pre-renal from poor PO intake     - f/u urine studies  - c/w fluids   - Monitor SCr

## 2022-01-16 NOTE — H&P ADULT - ASSESSMENT
89 Y/O F with HTN, osteoathritis presenting with malaise, nausea/vomiting found to have UTI, DIMITRI  89 Y/O F with HTN, HLD, osteoathritis presenting with malaise, nausea/vomiting found to have UTI, DIMITRI, rhabdo

## 2022-01-16 NOTE — H&P ADULT - ATTENDING COMMENTS
Patient seen and examined with team  Agree with above a/p by Jos  89 Y/O F with HTN, HLD, osteoathritis presenting with malaise, nausea/vomiting found to have UTI, SHAY, rhabdo  Notes to p/w N/v  Pe nad Vital Signs Last 24 Hrs T(F): 98, HR: 80, BP: 152/50, RR: 18, SpO2: 96% Ra    Labs sig wbc 16, bun/ creat 28/1.52, cpk 1616hs trop 188--->190, Urine pos leuk, Na 130  ra< from: US Abdomen Upper Quadrant Right (01.15.22 @ 23:26) >    < from: US Abdomen Upper Quadrant Right (01.15.22 @ 23:26) >  Liver: Multiple hepatic cysts are seen as on prior CTs, measuring up to   6.1 cm. No evidence of acute cholecystitis. No intra or extrahepatic biliary ductal dilatation.    < from: CT Abdomen and Pelvis No Cont (01.15.22 @ 22:45) >  LIVER: Multiple hepatic hypodensities are seen, measuring up to 7.1 cm, characterized as cysts on subsequent ultrasound. These have slightly   increased in size since 2009. KIDNEYS/URETERS: A 6 mm high density nodule is seen projecting from the     A/P UTI , N/V, Hyponatremia and Rhabdo  # ID c/w Zosyn f/u urine and blood cultures  # CVS no chest pain , trend hstrop and monitor cpk, ckmb from 1/15 and 1/16, will ask for cardiology help in % MB elevted  #CPK elevation most likely sec to Rhabdo- IVF hydration and monitor cpk  # Shay- c/w ivf hydration  and monitor bun/ creat  # Hyponatremia Na 130, monitor Na with IVF hydration. BMp again this Pm. Do not correct > 8 meq in 24 hrs.  # Dvt proph- hep sq Patient seen and examined with team  Agree with above a/p by Jos  89 Y/O F with HTN, HLD, osteoathritis presenting with malaise, nausea/vomiting found to have UTI, SHAY, rhabdo  Notes to p/w N/v  Pe nad Vital Signs Last 24 Hrs T(F): 98, HR: 80, BP: 152/50, RR: 18, SpO2: 96% Ra  Lungs cta, cor rrr, abd soft n/t  Labs sig wbc 16, bun/ creat 28/1.52, cpk 1616hs trop 188--->190, Urine pos leuk, Na 130  ra< from: US Abdomen Upper Quadrant Right (01.15.22 @ 23:26) >    < from: US Abdomen Upper Quadrant Right (01.15.22 @ 23:26) >  Liver: Multiple hepatic cysts are seen as on prior CTs, measuring up to   6.1 cm. No evidence of acute cholecystitis. No intra or extrahepatic biliary ductal dilatation.    < from: CT Abdomen and Pelvis No Cont (01.15.22 @ 22:45) >  LIVER: Multiple hepatic hypodensities are seen, measuring up to 7.1 cm, characterized as cysts on subsequent ultrasound. These have slightly   increased in size since 2009. KIDNEYS/URETERS: A 6 mm high density nodule is seen projecting from the     A/P UTI , N/V, Hyponatremia and Rhabdo  # ID c/w Zosyn f/u urine and blood cultures  # CVS no chest pain , trend hstrop and monitor cpk, ckmb from 1/15 and 1/16, will ask for cardiology help in % MB elevted  #CPK elevation most likely sec to Rhabdo- IVF hydration and monitor cpk  # Shay- c/w ivf hydration  and monitor bun/ creat  # Hyponatremia Na 130, monitor Na with IVF hydration. BMp again this Pm. Do not correct > 8 meq in 24 hrs.  # Dvt proph- hep sq

## 2022-01-16 NOTE — H&P ADULT - PROBLEM SELECTOR PLAN 3
Trops: 188 -->190 and CK: 1700  likely demand ischemia     -f/u CKMB assay   -F/u TTE  - trend trops Trops: 188 -->190 and CK: 1700  likely demand ischemia   EKG: no acute ST or T wave changes and pt not having CP     -f/u CKMB assay   -F/u TTE  - trend trops

## 2022-01-16 NOTE — OCCUPATIONAL THERAPY INITIAL EVALUATION ADULT - PERTINENT HX OF CURRENT PROBLEM, REHAB EVAL
Pt is a 89 y/o female with HTN, HLD, osteoathritis presenting with malaise, nausea/vomiting found to have UTI, DIMITRI, rhabdo

## 2022-01-16 NOTE — H&P ADULT - NSHPPHYSICALEXAM_GEN_ALL_CORE
VITALS:   T(C): 36.7 (01-16-22 @ 06:36), Max: 38.1 (01-15-22 @ 22:00)  HR: 65 (01-16-22 @ 06:36) (59 - 80)  BP: 158/55 (01-16-22 @ 06:36) (139/45 - 178/63)  RR: 16 (01-16-22 @ 06:36) (14 - 18)  SpO2: 99% (01-16-22 @ 06:36) (97% - 100%)    GENERAL: NAD, lying in bed comfortably  HEAD:  Atraumatic, Normocephalic  EYES: EOMI, conjunctiva and sclera clear  ENT: Moist mucous membranes  NECK: Supple, No JVD  CHEST/LUNG: Clear to auscultation bilaterally; No rales, rhonchi, wheezing, or rubs. Unlabored respirations  HEART: Regular rate and rhythm; No murmurs, rubs, or gallops  ABDOMEN: BSx4; Soft, nontender, nondistended, + palpated abdominal nodule on L. abdomen   EXTREMITIES:  2+ Peripheral Pulses, brisk capillary refill. No clubbing, cyanosis, or edema  NERVOUS SYSTEM:  A&Ox3, no focal deficits. sensation intact, strength 5/5 in B/L UE and LE   SKIN: No rashes or lesions VITALS:   T(C): 36.7 (01-16-22 @ 06:36), Max: 38.1 (01-15-22 @ 22:00)  HR: 65 (01-16-22 @ 06:36) (59 - 80)  BP: 158/55 (01-16-22 @ 06:36) (139/45 - 178/63)  RR: 16 (01-16-22 @ 06:36) (14 - 18)  SpO2: 99% (01-16-22 @ 06:36) (97% - 100%)    GENERAL: NAD, lying in bed comfortably  HEAD:  Atraumatic, Normocephalic  EYES: EOMI, conjunctiva and sclera clear  ENT: Moist mucous membranes  NECK: Supple, No JVD  CHEST/LUNG: Clear to auscultation bilaterally; No rales, rhonchi, wheezing, or rubs. Unlabored respirations  HEART: Regular rate and rhythm; No murmurs, rubs, or gallops  ABDOMEN: BSx4; Soft, nontender, nondistended, + palpated abdominal nodule on L. abdomen  : no suprapubic or flank tenderness   EXTREMITIES:  2+ Peripheral Pulses, brisk capillary refill. No clubbing, cyanosis, or edema  NERVOUS SYSTEM:  A&Ox3, no focal deficits. sensation intact, strength 5/5 in B/L UE and LE   SKIN: No rashes or lesions

## 2022-01-16 NOTE — H&P ADULT - PROBLEM SELECTOR PLAN 8
DVT Ppx: heparin subq  Diet: DASH    PT/OT f/u Hgb: 9 with unclear baseline   normocytic anemia    - f/u ferritin, iron with TIBC, transferrin sat, retic count

## 2022-01-16 NOTE — H&P ADULT - NSHPREVIEWOFSYSTEMS_GEN_ALL_CORE
CONSTITUTIONAL: + malaise, + chills, no fevers reported   EYES/ENT: No visual changes;  No vertigo or throat pain   NECK: No pain or stiffness  RESPIRATORY: No cough, wheezing, hemoptysis; No shortness of breath  CARDIOVASCULAR: No chest pain or palpitations  GASTROINTESTINAL: No abdominal or epigastric pain. no hematemesis; No diarrhea or constipation. No melena or hematochezia. + nausea, vomiting,   GENITOURINARY: No dysuria, frequency or hematuria, + chronic L. flank pain   NEUROLOGICAL: No numbness or weakness  SKIN: No itching, burning, rashes, or lesions   PSYCH: no hx depression, no hx anxiety

## 2022-01-16 NOTE — OCCUPATIONAL THERAPY INITIAL EVALUATION ADULT - LIGHT TOUCH SENSATION, LUE, REHAB EVAL
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 abundant LLM's  [de-identified] : BAL - few MRSA  within normal limits

## 2022-01-16 NOTE — H&P ADULT - NSHPSOCIALHISTORY_GEN_ALL_CORE
Lives by herself. Performs all ADL's by herself though she has assistance 1/month. Denies cigarette use, alcohol use and recreational drug use.

## 2022-01-16 NOTE — H&P ADULT - NSHPLABSRESULTS_GEN_ALL_CORE
LABS:                        9.0    16.09 )-----------( 218      ( 15 Uche 2022 21:41 )             27.1     01-15    130<L>  |  95<L>  |  28<H>  ----------------------------<  148<H>  4.3   |  19<L>  |  1.52<H>    Ca    8.8      15 [place] 2022 21:41  Phos  3.1     01-15  Mg     1.70     01-15    TPro  6.5  /  Alb  4.1  /  TBili  2.2<H>  /  DBili  x   /  AST  733<H>  /  ALT  897<H>  /  AlkPhos  196<H>  01-15        CARDIAC MARKERS ( 15 U[place] 2022 22:43 )  x     / x     / 1616 U/L / x     / x          Urinalysis Basic - ( 2022 03:22 )    Color: Yellow / Appearance: Clear / S.015 / pH: x  Gluc: x / Ketone: Negative  / Bili: Negative / Urobili: <2 mg/dL   Blood: x / Protein: 100 mg/dL / Nitrite: Negative   Leuk Esterase: Large / RBC: 3 /HPF / WBC 16 /HPF   Sq Epi: x / Non Sq Epi: Few / Bacteria: Few        VBG 01-15 @ 22:34  pH: --/pCO2: -- /pO2: --/HCO3: --/lactate: 1.6    Microbiology     EKG: HR: 88; NSR no ST or T wave changes.     RADIOLOGY & ADDITIONAL TESTS:    CT Head: No acute intracranial hemorrhage, mass effect, or acute osseous fracture.    CT Abdomen: No hydronephrosis or evidence of renal calculus. No posttraumatic sequela on this noncontrast study. 4.7 cm left adnexal cyst. Indeterminant high density cortically-based left renal lesion possibly hemorrhagic or proteinaceous cyst.     CT Cervical Spine:   No acute cervical spine fracture or evidence of traumatic malalignment. Cervical spondylosis.    CXR:   Clear lungs. Degenerative changes of the shoulders.

## 2022-01-16 NOTE — H&P ADULT - HISTORY OF PRESENT ILLNESS
89 Y/O F with HTN presents with nausea and NBNB vomiting that began a week ago and worsened yest. Notes vomiting ~3x last week. Associated with chills and generalized malaise.  Temp at home ranged from 96.8 to 97.2. Also notes trauma to the head while walking to the bathroom at night without lights 6 days ago. No LOC, no headaches. Notes poor water intake over the past week and improvement in symptoms after IV fluid hydration. No sick contacts. No recent travel hx. Denies CP, SOB, abdominal pain, dysuria, hematuria, diarrhea/constipation. COVID vaccinatedx3    In the ED, found to be febrile (100.6) and received tylenol. Put on 2L NC. Received zosyn, vanc and 2L NS.  89 Y/O F with HTN, HLD, OA presents with nausea and NBNB vomiting that began a week ago and worsened yest. Notes vomiting ~3x last week. Associated with chills and generalized malaise.  Temp at home ranged from 96.8 to 97.2. Also notes trauma to the head while walking to the bathroom at night without lights 6 days ago. No LOC, no headaches. Notes poor PO intake over the past week and improvement in symptoms after IV fluid hydration. No sick contacts. No recent travel hx. Pt notes chronic L. flank pain which she has been experiencing for years; relieved with tylenol. Has been taking tylenol 650 x2 tabs daily. Denies CP, SOB, abdominal pain, dysuria, hematuria, diarrhea/constipation. COVID vaccinatedx3    In the ED, found to be febrile (100.6) and received tylenol. Put on 2L NC. Received zosyn, vanc and 2L NS.

## 2022-01-16 NOTE — H&P ADULT - PROBLEM SELECTOR PLAN 1
Pt presenting with nonspecific sxs found to have +LE, pyuria and few bacteria in the urine along with leukocytosis with neutrophilia. Denies dysuria or changes in urinary pattern  s/p zosyn and vanc in the ED    - f/u UCx  - Pt presenting with nonspecific sxs found to have +LE, pyuria and few bacteria in the urine along with leukocytosis with neutrophilia. Denies dysuria or changes in urinary pattern  temp of 100.6 in the ED   s/p zosyn and vanc in the ED    - f/u UCx  - c/w zosyn  - monitor fever curve, leukocytosis Pt presenting with nonspecific sxs found to have +LE, pyuria and few bacteria in the urine along with leukocytosis with neutrophilia. Denies dysuria or changes in urinary pattern  temp of 100.6 in the ED   s/p zosyn and vanc in the ED  lactate: 1.6     - f/u UCx  - c/w zosyn  - monitor fever curve, leukocytosis

## 2022-01-16 NOTE — H&P ADULT - PROBLEM SELECTOR PLAN 6
CT abd: Indeterminant high density cortically-based left renal lesion possibly hemorrhagic or proteinaceous cyst.   Pt notes chronic L. flank pain    - f/u renal US

## 2022-01-17 LAB
ALBUMIN SERPL ELPH-MCNC: 3.7 G/DL — SIGNIFICANT CHANGE UP (ref 3.3–5)
ALP SERPL-CCNC: 139 U/L — HIGH (ref 40–120)
ALT FLD-CCNC: 407 U/L — HIGH (ref 4–33)
ANION GAP SERPL CALC-SCNC: 10 MMOL/L — SIGNIFICANT CHANGE UP (ref 7–14)
AST SERPL-CCNC: 153 U/L — HIGH (ref 4–32)
BASOPHILS # BLD AUTO: 0.04 K/UL — SIGNIFICANT CHANGE UP (ref 0–0.2)
BASOPHILS NFR BLD AUTO: 0.3 % — SIGNIFICANT CHANGE UP (ref 0–2)
BILIRUB SERPL-MCNC: 0.7 MG/DL — SIGNIFICANT CHANGE UP (ref 0.2–1.2)
BUN SERPL-MCNC: 18 MG/DL — SIGNIFICANT CHANGE UP (ref 7–23)
CALCIUM SERPL-MCNC: 8.3 MG/DL — LOW (ref 8.4–10.5)
CHLORIDE SERPL-SCNC: 108 MMOL/L — HIGH (ref 98–107)
CO2 SERPL-SCNC: 20 MMOL/L — LOW (ref 22–31)
CREAT SERPL-MCNC: 1.27 MG/DL — SIGNIFICANT CHANGE UP (ref 0.5–1.3)
CULTURE RESULTS: NO GROWTH — SIGNIFICANT CHANGE UP
EOSINOPHIL # BLD AUTO: 0.02 K/UL — SIGNIFICANT CHANGE UP (ref 0–0.5)
EOSINOPHIL NFR BLD AUTO: 0.2 % — SIGNIFICANT CHANGE UP (ref 0–6)
GLUCOSE SERPL-MCNC: 125 MG/DL — HIGH (ref 70–99)
HCT VFR BLD CALC: 27.7 % — LOW (ref 34.5–45)
HGB BLD-MCNC: 9 G/DL — LOW (ref 11.5–15.5)
IANC: 10.61 K/UL — HIGH (ref 1.5–8.5)
IMM GRANULOCYTES NFR BLD AUTO: 0.8 % — SIGNIFICANT CHANGE UP (ref 0–1.5)
LYMPHOCYTES # BLD AUTO: 0.91 K/UL — LOW (ref 1–3.3)
LYMPHOCYTES # BLD AUTO: 7.3 % — LOW (ref 13–44)
MAGNESIUM SERPL-MCNC: 1.7 MG/DL — SIGNIFICANT CHANGE UP (ref 1.6–2.6)
MCHC RBC-ENTMCNC: 30.3 PG — SIGNIFICANT CHANGE UP (ref 27–34)
MCHC RBC-ENTMCNC: 32.5 GM/DL — SIGNIFICANT CHANGE UP (ref 32–36)
MCV RBC AUTO: 93.3 FL — SIGNIFICANT CHANGE UP (ref 80–100)
MONOCYTES # BLD AUTO: 0.85 K/UL — SIGNIFICANT CHANGE UP (ref 0–0.9)
MONOCYTES NFR BLD AUTO: 6.8 % — SIGNIFICANT CHANGE UP (ref 2–14)
NEUTROPHILS # BLD AUTO: 10.61 K/UL — HIGH (ref 1.8–7.4)
NEUTROPHILS NFR BLD AUTO: 84.6 % — HIGH (ref 43–77)
NRBC # BLD: 0 /100 WBCS — SIGNIFICANT CHANGE UP
NRBC # FLD: 0 K/UL — SIGNIFICANT CHANGE UP
PHOSPHATE SERPL-MCNC: 2.1 MG/DL — LOW (ref 2.5–4.5)
PLATELET # BLD AUTO: 223 K/UL — SIGNIFICANT CHANGE UP (ref 150–400)
POTASSIUM SERPL-MCNC: 4.2 MMOL/L — SIGNIFICANT CHANGE UP (ref 3.5–5.3)
POTASSIUM SERPL-SCNC: 4.2 MMOL/L — SIGNIFICANT CHANGE UP (ref 3.5–5.3)
PROT SERPL-MCNC: 6.1 G/DL — SIGNIFICANT CHANGE UP (ref 6–8.3)
RBC # BLD: 2.97 M/UL — LOW (ref 3.8–5.2)
RBC # FLD: 12.9 % — SIGNIFICANT CHANGE UP (ref 10.3–14.5)
SODIUM SERPL-SCNC: 138 MMOL/L — SIGNIFICANT CHANGE UP (ref 135–145)
SPECIMEN SOURCE: SIGNIFICANT CHANGE UP
WBC # BLD: 12.53 K/UL — HIGH (ref 3.8–10.5)
WBC # FLD AUTO: 12.53 K/UL — HIGH (ref 3.8–10.5)

## 2022-01-17 PROCEDURE — 99233 SBSQ HOSP IP/OBS HIGH 50: CPT | Mod: GC

## 2022-01-17 RX ORDER — SIMVASTATIN 20 MG/1
20 TABLET, FILM COATED ORAL AT BEDTIME
Refills: 0 | Status: DISCONTINUED | OUTPATIENT
Start: 2022-01-17 | End: 2022-01-17

## 2022-01-17 RX ORDER — FERROUS SULFATE 325(65) MG
325 TABLET ORAL DAILY
Refills: 0 | Status: DISCONTINUED | OUTPATIENT
Start: 2022-01-17 | End: 2022-01-19

## 2022-01-17 RX ORDER — ATENOLOL 25 MG/1
25 TABLET ORAL
Refills: 0 | Status: DISCONTINUED | OUTPATIENT
Start: 2022-01-17 | End: 2022-01-19

## 2022-01-17 RX ORDER — LIDOCAINE 4 G/100G
1 CREAM TOPICAL ONCE
Refills: 0 | Status: COMPLETED | OUTPATIENT
Start: 2022-01-17 | End: 2022-01-17

## 2022-01-17 RX ORDER — ATENOLOL 25 MG/1
50 TABLET ORAL AT BEDTIME
Refills: 0 | Status: DISCONTINUED | OUTPATIENT
Start: 2022-01-17 | End: 2022-01-17

## 2022-01-17 RX ORDER — CHOLECALCIFEROL (VITAMIN D3) 125 MCG
400 CAPSULE ORAL DAILY
Refills: 0 | Status: DISCONTINUED | OUTPATIENT
Start: 2022-01-18 | End: 2022-01-19

## 2022-01-17 RX ORDER — TRAMADOL HYDROCHLORIDE 50 MG/1
25 TABLET ORAL AT BEDTIME
Refills: 0 | Status: DISCONTINUED | OUTPATIENT
Start: 2022-01-17 | End: 2022-01-17

## 2022-01-17 RX ORDER — ATENOLOL 25 MG/1
50 TABLET ORAL DAILY
Refills: 0 | Status: DISCONTINUED | OUTPATIENT
Start: 2022-01-17 | End: 2022-01-18

## 2022-01-17 RX ORDER — SODIUM,POTASSIUM PHOSPHATES 278-250MG
1 POWDER IN PACKET (EA) ORAL THREE TIMES A DAY
Refills: 0 | Status: COMPLETED | OUTPATIENT
Start: 2022-01-17 | End: 2022-01-18

## 2022-01-17 RX ADMIN — LOSARTAN POTASSIUM 100 MILLIGRAM(S): 100 TABLET, FILM COATED ORAL at 06:25

## 2022-01-17 RX ADMIN — SODIUM CHLORIDE 75 MILLILITER(S): 9 INJECTION INTRAMUSCULAR; INTRAVENOUS; SUBCUTANEOUS at 17:48

## 2022-01-17 RX ADMIN — Medication 1 PACKET(S): at 21:09

## 2022-01-17 RX ADMIN — HEPARIN SODIUM 5000 UNIT(S): 5000 INJECTION INTRAVENOUS; SUBCUTANEOUS at 06:26

## 2022-01-17 RX ADMIN — Medication 1 PACKET(S): at 13:15

## 2022-01-17 RX ADMIN — ATENOLOL 50 MILLIGRAM(S): 25 TABLET ORAL at 20:37

## 2022-01-17 RX ADMIN — LIDOCAINE 1 PATCH: 4 CREAM TOPICAL at 18:52

## 2022-01-17 RX ADMIN — Medication 325 MILLIGRAM(S): at 13:15

## 2022-01-17 RX ADMIN — LIDOCAINE 1 PATCH: 4 CREAM TOPICAL at 13:14

## 2022-01-17 RX ADMIN — HEPARIN SODIUM 5000 UNIT(S): 5000 INJECTION INTRAVENOUS; SUBCUTANEOUS at 13:15

## 2022-01-17 RX ADMIN — HEPARIN SODIUM 5000 UNIT(S): 5000 INJECTION INTRAVENOUS; SUBCUTANEOUS at 21:08

## 2022-01-17 RX ADMIN — PIPERACILLIN AND TAZOBACTAM 25 GRAM(S): 4; .5 INJECTION, POWDER, LYOPHILIZED, FOR SOLUTION INTRAVENOUS at 17:47

## 2022-01-17 RX ADMIN — TRAMADOL HYDROCHLORIDE 25 MILLIGRAM(S): 50 TABLET ORAL at 22:47

## 2022-01-17 RX ADMIN — PANTOPRAZOLE SODIUM 40 MILLIGRAM(S): 20 TABLET, DELAYED RELEASE ORAL at 06:26

## 2022-01-17 RX ADMIN — PIPERACILLIN AND TAZOBACTAM 25 GRAM(S): 4; .5 INJECTION, POWDER, LYOPHILIZED, FOR SOLUTION INTRAVENOUS at 06:26

## 2022-01-17 NOTE — PROGRESS NOTE ADULT - PROBLEM SELECTOR PLAN 4
CK: 1700 on arrival  pt notes weakness and immobility last week     - c/w fluids  - trend CK CK: 1600 on arrival --> 1200  pt notes weakness and immobility last week     - c/w fluids  - trend CK

## 2022-01-17 NOTE — PROGRESS NOTE ADULT - PROBLEM SELECTOR PLAN 1
Pt presenting with nonspecific sxs found to have +LE, pyuria and few bacteria in the urine along with leukocytosis with neutrophilia. Denies dysuria or changes in urinary pattern  temp of 100.6 in the ED   s/p zosyn and vanc in the ED  lactate: 1.6     - f/u UCx  - c/w zosyn  - monitor fever curve, leukocytosis Pt presenting with nonspecific sxs found to have +LE, pyuria and few bacteria in the urine along with leukocytosis with neutrophilia. Denies dysuria or changes in urinary pattern  temp of 100.6 in the ED   s/p zosyn and vanc in the ED  lactate: 1.6   UCx: neg; leukocytosis downtrending     - c/w zosyn (1/16-1/18)  - monitor fever curve, leukocytosis

## 2022-01-17 NOTE — PHYSICAL THERAPY INITIAL EVALUATION ADULT - IMPAIRMENTS CONTRIBUTING TO GAIT DEVIATIONS, PT EVAL
slight sway to right; however able to catch her self; very minimal decreased in balance slight sway to right; however able to catch her self; very minimal decrease in balance

## 2022-01-17 NOTE — PROGRESS NOTE ADULT - PROBLEM SELECTOR PLAN 2
Cr: 1.5 on arrival with unknown baseline  likely pre-renal from poor PO intake     - f/u urine studies  - c/w fluids   - Monitor SCr Cr: 1.5 on arrival with unknown baseline  likely pre-renal from poor PO intake   FeNa: prerenal  SCr no wnl     - c/w fluids   - Monitor SCr

## 2022-01-17 NOTE — PROGRESS NOTE ADULT - ATTENDING COMMENTS
Patient seen and examinedd  Agree with A/p by Dr Arguello  89 Y/O F with HTN, HLD, osteoarthritis presenting with malaise, nausea/vomiting found to have UTI, DIMITRI, rhabdo  PE nad, sitting in chair, resolved n/v. noted poor fluid intake with n/c x 1week pta.  back octavio x weeks on L side. no pin with SLR, no stoppage of urine or bowels or in continence  Vital Signs Last 24 Hrs T(F): 98.4, HR: 79, BP: 129/43, RR: 16, SpO2: 98% RA lungs cta b/l, cor rrr, abd soft n/t ext neg e/c/c- Arth changes to fingers  no pain with SLR  labs sig WBC 15.7 --> 12.3, Creat 1.45---> 1.27. Blood and urine culture- negative.  rd< from: US Kidney and Bladder (01.16.22 @ 12:43) >  IMPRESSION:  0.6 cm simple cyst within the left kidney corresponding to the lesion   seen on recent CT.  A/P UTI , N/V, Hyponatremia and Rhabdo  # ID c/w Zosyn day #2 f/u urine and blood cultures- NTD  # CVS no chest pain , trend hstrop and monitor cpk, ckmb from 1/15 and 1/16, % MB 0.7- WNL  #CPK elevation most likely sec to Rhabdo- IVF hydration and monitor cpk 1606--> 1201 c/w ivf  # Dimitri- c/w ivf hydration  and monitor bun/ creat  # Hyponatremia Na 130--> 133, c/w ivf  monitor Na with IVF hydration. . Do not correct > 8 meq in 24 hrs.  # L mid back pain- b/l on palpation L.R x weeks, tylenol prn mild pain, Ultram prn mod to severe pain  # Dvt proph- hep sq. Patient seen and examinedd  Agree with A/p by Dr Arguello  89 Y/O F with HTN, HLD, osteoarthritis presenting with malaise, nausea/vomiting found to have UTI, DIMITRI, rhabdo  PE nad, sitting in chair, resolved n/v. noted poor fluid intake with n/c x 1week pta.  back octavio x weeks on L side. no pin with SLR, no stoppage of urine or bowels or in continence  Vital Signs Last 24 Hrs T(F): 98.4, HR: 79, BP: 129/43, RR: 16, SpO2: 98% RA lungs cta b/l, cor rrr, abd soft n/t ext neg e/c/c- Arth changes to fingers  no pain with SLR  labs sig WBC 15.7 --> 12.3, Creat 1.45---> 1.27. Blood and urine culture- negative.  rd< from: US Kidney and Bladder (01.16.22 @ 12:43) >  IMPRESSION:  0.6 cm simple cyst within the left kidney corresponding to the lesion   seen on recent CT.  A/P UTI , N/V, Hyponatremia and Rhabdo  # ID c/w Zosyn day #2,  f/u urine and blood cultures- NTD  # CVS no chest pain , trend hstrop and monitor cpk, ckmb from 1/15 and 1/16, % MB 0.7- WNL  #CPK elevation most likely sec to Rhabdo- IVF hydration and monitor cpk 1606--> 1201 c/w ivf  # Dimitri- c/w ivf hydration  and monitor bun/ creat  # Hyponatremia Na 130--> 133, c/w ivf  monitor Na with IVF hydration. . Do not correct > 8 meq in 24 hrs.  # L mid back pain- b/l on palpation L.R x weeks, tylenol prn mild pain, Ultram prn mod to severe pain  # Dvt proph- hep sq.  called son Mr SILVESTRE Mehta at [phone] and updated- son   pt eval for ambulation Patient seen and examinedd  Agree with A/p by Dr Arguello  89 Y/O F with HTN, HLD, osteoarthritis presenting with malaise, nausea/vomiting found to have UTI, DIMITRI, rhabdo  PE nad, sitting in chair, resolved n/v. noted poor fluid intake with n/c x 1week pta.  back octavio x weeks on L side. no pin with SLR, no stoppage of urine or bowels or in continence  Vital Signs Last 24 Hrs T(F): 98.4, HR: 79, BP: 129/43, RR: 16, SpO2: 98% RA lungs cta b/l, cor rrr, abd soft n/t ext neg e/c/c- Arth changes to fingers  no pain with SLR  labs sig WBC 15.7 --> 12.3, Creat 1.45---> 1.27. Blood and urine culture- negative.  rd< from: US Kidney and Bladder (01.16.22 @ 12:43) >  IMPRESSION:  0.6 cm simple cyst within the left kidney corresponding to the lesion   seen on recent CT.  rd< from: CT Abdomen and Pelvis No Cont (01.15.22 @ 22:45) >    BONES: Age-indeterminate but likely chronic compression deformities of   T11 and L2. Severe degenerative changes of the spine. S-shaped   thoracolumbar scoliosis.    A/P UTI , N/V, Hyponatremia and Rhabdo  # ID c/w Zosyn day #2,  f/u urine and blood cultures- NTD  # CVS no chest pain , trend hstrop and monitor cpk, ckmb from 1/15 and 1/16, % MB 0.7- WNL  #CPK elevation most likely sec to Rhabdo- IVF hydration and monitor cpk 1606--> 1201 c/w ivf  # Dimitri- c/w ivf hydration  and monitor bun/ creat  # Hyponatremia Na 130--> 133, c/w ivf  monitor Na with IVF hydration. . Do not correct > 8 meq in 24 hrs.  # L mid back pain- b/l on palpation L.R x weeks, tylenol prn mild pain, Ultram x1 tonight to asses if help with pain  back pain most likely secondary to osteopetrosis vs OA. vit d level and start vitamin D po.  # Dvt proph- hep sq.  called son Mr SILVESTRE Mehta at [phone] and updated- son   pt ahmet for ambulation/ trial of ultram/ Vit d level and vitamin- no dispo w/u first talking to son

## 2022-01-17 NOTE — PROVIDER CONTACT NOTE (OTHER) - SITUATION
tele tech called when pt with PT  with stairs. BP elevated, rechecked VS 1 hour later, improved. see flowsheets

## 2022-01-17 NOTE — PROGRESS NOTE ADULT - PROBLEM SELECTOR PLAN 3
Trops: 188 -->190 and CK: 1700  likely demand ischemia   EKG: no acute ST or T wave changes and pt not having CP     -f/u CKMB assay   -F/u TTE  - trend trops Trops: 188 -->190 --> 140  EKG: no acute ST or T wave changes and pt not having CP   CKMB assay: 0.7%--> less likely cardiac   likely demand ischemia     -F/u TTE

## 2022-01-17 NOTE — PHYSICAL THERAPY INITIAL EVALUATION ADULT - PATIENT PROFILE REVIEW, REHAB EVAL
No Formal Activity Order in the Computer; Spoke with LOUIE Maddox prior to PT evaluation--> Pt OK for PT consult/OOB activity./yes

## 2022-01-17 NOTE — PHYSICAL THERAPY INITIAL EVALUATION ADULT - PERTINENT HX OF CURRENT PROBLEM, REHAB EVAL
89 Y/O F with HTN, HLD, osteoathritis presenting with malaise, nausea/vomiting found to have UTI, DIMITRI, rhabdo

## 2022-01-17 NOTE — PROGRESS NOTE ADULT - PROBLEM SELECTOR PLAN 6
CT abd: Indeterminant high density cortically-based left renal lesion possibly hemorrhagic or proteinaceous cyst.   Pt notes chronic L. flank pain    - f/u renal US CT abd: Indeterminant high density cortically-based left renal lesion possibly hemorrhagic or proteinaceous cyst.   Pt notes chronic L. flank pain  renal US: 0.6 cm simple cyst within the left kidney corresponding to the lesion seen on recent CT.    f/u as OP

## 2022-01-17 NOTE — PROGRESS NOTE ADULT - SUBJECTIVE AND OBJECTIVE BOX
*****************************************  Nasra Arguello MD I PGY1  Internal Medicine  Pager NS: [phone] I [facility]: [phone]  *****************************************      Patient is a 89y old  Female who presents with a chief complaint of weakness, UTI (2022 09:25)      SUBJECTIVE :      MEDICATIONS  (STANDING):  heparin   Injectable 5000 Unit(s) SubCutaneous every 8 hours  losartan 100 milliGRAM(s) Oral daily  pantoprazole    Tablet 40 milliGRAM(s) Oral before breakfast  piperacillin/tazobactam IVPB.. 3.375 Gram(s) IV Intermittent every 12 hours  sodium chloride 0.9%. 1000 milliLiter(s) (75 mL/Hr) IV Continuous <Continuous>    MEDICATIONS  (PRN):      CAPILLARY BLOOD GLUCOSE        I&O's Summary    2022 07:01  -  2022 06:39  --------------------------------------------------------  IN: 0 mL / OUT: 650 mL / NET: -650 mL        PHYSICAL EXAM:  Vital Signs Last 24 Hrs  T(C): 37.1 (2022 02:39), Max: 37.1 (2022 02:39)  T(F): 98.7 (2022 02:39), Max: 98.7 (2022 02:39)  HR: 93 (2022 02:39) (73 - 93)  BP: 160/80 (2022 02:39) (144/47 - 165/70)  BP(mean): --  RR: 18 (2022 02:39) (16 - 18)  SpO2: 97% (2022 02:39) (96% - 98%)    GENERAL: NAD, lying in bed comfortably  HEAD:  Atraumatic, Normocephalic  EYES: EOMI, conjunctiva and sclera clear  ENT: Moist mucous membranes  NECK: Supple, No JVD  CHEST/LUNG: Clear to auscultation bilaterally; No rales, rhonchi, wheezing, or rubs. Unlabored respirations  HEART: Regular rate and rhythm; No murmurs, rubs, or gallops  ABDOMEN: BSx4; Soft, nontender, nondistended, + palpated abdominal nodule on L. abdomen  : no suprapubic or flank tenderness   EXTREMITIES:  2+ Peripheral Pulses, brisk capillary refill. No clubbing, cyanosis, or edema  NERVOUS SYSTEM:  A&Ox3, no focal deficits. sensation intact, strength 5/5 in B/L UE and LE   SKIN: No rashes or lesions    LABS:                        9.1    15.70 )-----------( 228      ( 2022 11:05 )             28.0     -    135  |  103  |  22  ----------------------------<  140<H>  4.5   |  18<L>  |  1.29    Ca    8.3<L>      2022 20:47  Phos  2.9       Mg     1.70         TPro  6.6  /  Alb  4.0  /  TBili  1.0  /  DBili  x   /  AST  405<H>  /  ALT  659<H>  /  AlkPhos  163<H>        CARDIAC MARKERS ( 2022 11:05 )  x     / x     / 1201 U/L / x     / 7.9 ng/mL  CARDIAC MARKERS ( 2022 00:21 )  x     / x     / x     / x     / 6.2 ng/mL  CARDIAC MARKERS ( 15 [facility]che 2022 22:43 )  x     / x     / 1616 U/L / x     / x          Urinalysis Basic - ( 2022 03:22 )    Color: Yellow / Appearance: Clear / S.015 / pH: x  Gluc: x / Ketone: Negative  / Bili: Negative / Urobili: <2 mg/dL   Blood: x / Protein: 100 mg/dL / Nitrite: Negative   Leuk Esterase: Large / RBC: 3 /HPF / WBC 16 /HPF   Sq Epi: x / Non Sq Epi: Few / Bacteria: Few          RADIOLOGY & ADDITIONAL TESTS:  Results Reviewed: Y  Imaging Personally Reviewed:  Electrocardiogram Personally Reviewed:    COORDINATION OF CARE:  Care Discussed with Consultants/Other Providers [Y/N]:  Prior or Outpatient Records Reviewed [Y/N]:   *****************************************  Nasra Arguello MD I PGY1  Internal Medicine  Pager NS: [phone] I [facility]: [phone]  *****************************************      Patient is a 89y old  Female who presents with a chief complaint of weakness, UTI (2022 09:25)      SUBJECTIVE : NAEO. Pt seen and examined at bedside. Notes that she wants to go home and does not want to be kept here.     Denies CP, SOB, fever/chills, dysuria, hematuria, diarrhea/constipation.       MEDICATIONS  (STANDING):  heparin   Injectable 5000 Unit(s) SubCutaneous every 8 hours  losartan 100 milliGRAM(s) Oral daily  pantoprazole    Tablet 40 milliGRAM(s) Oral before breakfast  piperacillin/tazobactam IVPB.. 3.375 Gram(s) IV Intermittent every 12 hours  sodium chloride 0.9%. 1000 milliLiter(s) (75 mL/Hr) IV Continuous <Continuous>    MEDICATIONS  (PRN):      CAPILLARY BLOOD GLUCOSE        I&O's Summary    2022 07:01  -  2022 06:39  --------------------------------------------------------  IN: 0 mL / OUT: 650 mL / NET: -650 mL        PHYSICAL EXAM:  Vital Signs Last 24 Hrs  T(C): 37.1 (2022 02:39), Max: 37.1 (2022 02:39)  T(F): 98.7 (2022 02:39), Max: 98.7 (2022 02:39)  HR: 93 (2022 02:39) (73 - 93)  BP: 160/80 (2022 02:39) (144/47 - 165/70)  BP(mean): --  RR: 18 (2022 02:39) (16 - 18)  SpO2: 97% (2022 02:39) (96% - 98%)    GENERAL: NAD, lying in bed comfortably  EYES: EOMI, conjunctiva and sclera clear  ENT: Moist mucous membranes  CHEST/LUNG: Clear to auscultation bilaterally; No rales, rhonchi, wheezing, or rubs. Unlabored respirations  HEART: Regular rate and rhythm; No murmurs, rubs, or gallops  ABDOMEN: BSx4; Soft, nontender, nondistended, + palpated abdominal nodule on L. abdomen  : no suprapubic or flank tenderness   EXTREMITIES:  2+ Peripheral Pulses, brisk capillary refill. No clubbing, cyanosis, or edema  NERVOUS SYSTEM:  A&Ox3, no focal deficits. sensation intact, strength 5/5 in B/L UE and LE   SKIN: No rashes or lesions    LABS:                        9.1    15.70 )-----------( 228      ( 2022 11:05 )             28.0         135  |  103  |  22  ----------------------------<  140<H>  4.5   |  18<L>  |  1.29    Ca    8.3<L>      2022 20:47  Phos  2.9       Mg     1.70         TPro  6.6  /  Alb  4.0  /  TBili  1.0  /  DBili  x   /  AST  405<H>  /  ALT  659<H>  /  AlkPhos  163<H>        CARDIAC MARKERS ( 2022 11:05 )  x     / x     / 1201 U/L / x     / 7.9 ng/mL  CARDIAC MARKERS ( 2022 00:21 )  x     / x     / x     / x     / 6.2 ng/mL  CARDIAC MARKERS ( 15 [facility] 2022 22:43 )  x     / x     / 1616 U/L / x     / x          Urinalysis Basic - ( 2022 03:22 )    Color: Yellow / Appearance: Clear / S.015 / pH: x  Gluc: x / Ketone: Negative  / Bili: Negative / Urobili: <2 mg/dL   Blood: x / Protein: 100 mg/dL / Nitrite: Negative   Leuk Esterase: Large / RBC: 3 /HPF / WBC 16 /HPF   Sq Epi: x / Non Sq Epi: Few / Bacteria: Few          RADIOLOGY & ADDITIONAL TESTS:  Results Reviewed: Y  Imaging Personally Reviewed:  Electrocardiogram Personally Reviewed:    COORDINATION OF CARE:  Care Discussed with Consultants/Other Providers [Y/N]:  Prior or Outpatient Records Reviewed [Y/N]:

## 2022-01-17 NOTE — PHYSICAL THERAPY INITIAL EVALUATION ADULT - ADDITIONAL COMMENTS
Pt reports that she lives alone in a private house with 1 step to enter and a flight of stairs to negotiate to bedroom; (+)1 handrail (left rail up/right rail down). Prior to hospital admission pt was completely independent and used no assistive device with ambulation. Pt denies any recent falls.    Pt left comfortable in chair, NAD, all lines intact, all precautions maintained, with call bell in reach, and RN aware.

## 2022-01-17 NOTE — PROGRESS NOTE ADULT - PROBLEM SELECTOR PLAN 8
Hgb: 9 with unclear baseline   normocytic anemia    - f/u ferritin, iron with TIBC, transferrin sat, retic count Hgb: 9 with unclear baseline   normocytic anemia  iron: low  retic count: 1.3%    ferritin: 348  reports good compliance with colonoscopies with normal results    - iron supplementation

## 2022-01-17 NOTE — PHYSICAL THERAPY INITIAL EVALUATION ADULT - ACTIVE RANGE OF MOTION EXAMINATION, REHAB EVAL
alysha. upper extremity Active ROM was WNL (within normal limits)/bilateral lower extremity Active ROM was WNL (within normal limits)

## 2022-01-17 NOTE — PHYSICAL THERAPY INITIAL EVALUATION ADULT - DISCHARGE DISPOSITION, PT EVAL
Will keep pt on PT program while @ [facility] to prevent weakness/deconditioning./home/no skilled PT needs

## 2022-01-17 NOTE — PROGRESS NOTE ADULT - PROBLEM SELECTOR PLAN 5
AST/ALT: 733/897 on arrival   pt notes taking 2 tylenols/daily for the past month   CT Abd: Multiple hepatic hypodensities are seen, measuring up to 7.1 cm, characterized as cysts on subsequent ultrasound  US: No evidence of acute cholecystitis. No intra or extrahepatic biliary ductal dilatation.    - continue to monitor AST/ALT  - f/u hepatic function panel AST/ALT: 733/897 on arrival   pt notes taking 2 tylenols/daily for the past month   CT Abd: Multiple hepatic hypodensities are seen, measuring up to 7.1 cm, characterized as cysts on subsequent ultrasound  US: No evidence of acute cholecystitis. No intra or extrahepatic biliary ductal dilatation.    - continue to monitor AST/ALT

## 2022-01-18 ENCOUNTER — TRANSCRIPTION ENCOUNTER (OUTPATIENT)
Age: 87
End: 2022-01-18

## 2022-01-18 LAB
24R-OH-CALCIDIOL SERPL-MCNC: 32.1 NG/ML — SIGNIFICANT CHANGE UP (ref 30–80)
ALBUMIN SERPL ELPH-MCNC: 3.3 G/DL — SIGNIFICANT CHANGE UP (ref 3.3–5)
ALP SERPL-CCNC: 117 U/L — SIGNIFICANT CHANGE UP (ref 40–120)
ALT FLD-CCNC: 254 U/L — HIGH (ref 4–33)
ANION GAP SERPL CALC-SCNC: 10 MMOL/L — SIGNIFICANT CHANGE UP (ref 7–14)
AST SERPL-CCNC: 56 U/L — HIGH (ref 4–32)
BASOPHILS # BLD AUTO: 0.05 K/UL — SIGNIFICANT CHANGE UP (ref 0–0.2)
BASOPHILS NFR BLD AUTO: 0.5 % — SIGNIFICANT CHANGE UP (ref 0–2)
BILIRUB SERPL-MCNC: 0.6 MG/DL — SIGNIFICANT CHANGE UP (ref 0.2–1.2)
BUN SERPL-MCNC: 14 MG/DL — SIGNIFICANT CHANGE UP (ref 7–23)
CALCIUM SERPL-MCNC: 7.6 MG/DL — LOW (ref 8.4–10.5)
CHLORIDE SERPL-SCNC: 105 MMOL/L — SIGNIFICANT CHANGE UP (ref 98–107)
CK SERPL-CCNC: 160 U/L — SIGNIFICANT CHANGE UP (ref 25–170)
CO2 SERPL-SCNC: 22 MMOL/L — SIGNIFICANT CHANGE UP (ref 22–31)
CREAT SERPL-MCNC: 1.15 MG/DL — SIGNIFICANT CHANGE UP (ref 0.5–1.3)
EOSINOPHIL # BLD AUTO: 0.11 K/UL — SIGNIFICANT CHANGE UP (ref 0–0.5)
EOSINOPHIL NFR BLD AUTO: 1.1 % — SIGNIFICANT CHANGE UP (ref 0–6)
GLUCOSE SERPL-MCNC: 118 MG/DL — HIGH (ref 70–99)
HCT VFR BLD CALC: 26 % — LOW (ref 34.5–45)
HGB BLD-MCNC: 8.2 G/DL — LOW (ref 11.5–15.5)
IANC: 7.71 K/UL — SIGNIFICANT CHANGE UP (ref 1.5–8.5)
IMM GRANULOCYTES NFR BLD AUTO: 1.1 % — SIGNIFICANT CHANGE UP (ref 0–1.5)
LYMPHOCYTES # BLD AUTO: 1.01 K/UL — SIGNIFICANT CHANGE UP (ref 1–3.3)
LYMPHOCYTES # BLD AUTO: 10 % — LOW (ref 13–44)
MAGNESIUM SERPL-MCNC: 1.5 MG/DL — LOW (ref 1.6–2.6)
MCHC RBC-ENTMCNC: 30.6 PG — SIGNIFICANT CHANGE UP (ref 27–34)
MCHC RBC-ENTMCNC: 31.5 GM/DL — LOW (ref 32–36)
MCV RBC AUTO: 97 FL — SIGNIFICANT CHANGE UP (ref 80–100)
MONOCYTES # BLD AUTO: 1.08 K/UL — HIGH (ref 0–0.9)
MONOCYTES NFR BLD AUTO: 10.7 % — SIGNIFICANT CHANGE UP (ref 2–14)
NEUTROPHILS # BLD AUTO: 7.71 K/UL — HIGH (ref 1.8–7.4)
NEUTROPHILS NFR BLD AUTO: 76.6 % — SIGNIFICANT CHANGE UP (ref 43–77)
NRBC # BLD: 0 /100 WBCS — SIGNIFICANT CHANGE UP
NRBC # FLD: 0 K/UL — SIGNIFICANT CHANGE UP
PHOSPHATE SERPL-MCNC: 2.2 MG/DL — LOW (ref 2.5–4.5)
PLATELET # BLD AUTO: 226 K/UL — SIGNIFICANT CHANGE UP (ref 150–400)
POTASSIUM SERPL-MCNC: 4.3 MMOL/L — SIGNIFICANT CHANGE UP (ref 3.5–5.3)
POTASSIUM SERPL-SCNC: 4.3 MMOL/L — SIGNIFICANT CHANGE UP (ref 3.5–5.3)
PROT SERPL-MCNC: 6 G/DL — SIGNIFICANT CHANGE UP (ref 6–8.3)
RBC # BLD: 2.68 M/UL — LOW (ref 3.8–5.2)
RBC # FLD: 13.1 % — SIGNIFICANT CHANGE UP (ref 10.3–14.5)
SODIUM SERPL-SCNC: 137 MMOL/L — SIGNIFICANT CHANGE UP (ref 135–145)
VIT D25+D1,25 OH+D1,25 PNL SERPL-MCNC: 104 PG/ML — HIGH (ref 19.9–79.3)
WBC # BLD: 10.07 K/UL — SIGNIFICANT CHANGE UP (ref 3.8–10.5)
WBC # FLD AUTO: 10.07 K/UL — SIGNIFICANT CHANGE UP (ref 3.8–10.5)

## 2022-01-18 PROCEDURE — 93306 TTE W/DOPPLER COMPLETE: CPT | Mod: 26

## 2022-01-18 PROCEDURE — 99233 SBSQ HOSP IP/OBS HIGH 50: CPT | Mod: GC

## 2022-01-18 RX ORDER — ATENOLOL 25 MG/1
50 TABLET ORAL
Refills: 0 | Status: DISCONTINUED | OUTPATIENT
Start: 2022-01-18 | End: 2022-01-19

## 2022-01-18 RX ORDER — BENZOCAINE AND MENTHOL 5; 1 G/100ML; G/100ML
1 LIQUID ORAL
Refills: 0 | Status: DISCONTINUED | OUTPATIENT
Start: 2022-01-18 | End: 2022-01-19

## 2022-01-18 RX ORDER — MAGNESIUM SULFATE 500 MG/ML
1 VIAL (ML) INJECTION ONCE
Refills: 0 | Status: COMPLETED | OUTPATIENT
Start: 2022-01-18 | End: 2022-01-18

## 2022-01-18 RX ORDER — SODIUM,POTASSIUM PHOSPHATES 278-250MG
1 POWDER IN PACKET (EA) ORAL
Refills: 0 | Status: COMPLETED | OUTPATIENT
Start: 2022-01-18 | End: 2022-01-19

## 2022-01-18 RX ADMIN — ATENOLOL 50 MILLIGRAM(S): 25 TABLET ORAL at 21:34

## 2022-01-18 RX ADMIN — Medication 400 UNIT(S): at 12:46

## 2022-01-18 RX ADMIN — Medication 1 PACKET(S): at 06:08

## 2022-01-18 RX ADMIN — ATENOLOL 25 MILLIGRAM(S): 25 TABLET ORAL at 12:46

## 2022-01-18 RX ADMIN — HEPARIN SODIUM 5000 UNIT(S): 5000 INJECTION INTRAVENOUS; SUBCUTANEOUS at 06:08

## 2022-01-18 RX ADMIN — HEPARIN SODIUM 5000 UNIT(S): 5000 INJECTION INTRAVENOUS; SUBCUTANEOUS at 21:33

## 2022-01-18 RX ADMIN — PIPERACILLIN AND TAZOBACTAM 25 GRAM(S): 4; .5 INJECTION, POWDER, LYOPHILIZED, FOR SOLUTION INTRAVENOUS at 06:09

## 2022-01-18 RX ADMIN — HEPARIN SODIUM 5000 UNIT(S): 5000 INJECTION INTRAVENOUS; SUBCUTANEOUS at 12:47

## 2022-01-18 RX ADMIN — Medication 325 MILLIGRAM(S): at 12:46

## 2022-01-18 RX ADMIN — PANTOPRAZOLE SODIUM 40 MILLIGRAM(S): 20 TABLET, DELAYED RELEASE ORAL at 06:09

## 2022-01-18 RX ADMIN — Medication 100 GRAM(S): at 12:46

## 2022-01-18 RX ADMIN — PIPERACILLIN AND TAZOBACTAM 25 GRAM(S): 4; .5 INJECTION, POWDER, LYOPHILIZED, FOR SOLUTION INTRAVENOUS at 18:17

## 2022-01-18 RX ADMIN — ATENOLOL 50 MILLIGRAM(S): 25 TABLET ORAL at 06:09

## 2022-01-18 RX ADMIN — LOSARTAN POTASSIUM 100 MILLIGRAM(S): 100 TABLET, FILM COATED ORAL at 06:09

## 2022-01-18 RX ADMIN — Medication 1 PACKET(S): at 18:17

## 2022-01-18 NOTE — PROGRESS NOTE ADULT - PROBLEM SELECTOR PLAN 6
CT abd: Indeterminant high density cortically-based left renal lesion possibly hemorrhagic or proteinaceous cyst.   Pt notes chronic L. flank pain  renal US: 0.6 cm simple cyst within the left kidney corresponding to the lesion seen on recent CT.    f/u as OP

## 2022-01-18 NOTE — DISCHARGE NOTE PROVIDER - NSDCCPCAREPLAN_GEN_ALL_CORE_FT
PRINCIPAL DISCHARGE DIAGNOSIS  Diagnosis: Acute cystitis  Assessment and Plan of Treatment: You came in with nausea/vomiting and your urine tested positive for an acute infection. We treated you with antibiotics for 3 days. Urine culture did not have any bacterial growth; it could be that the antibiotic was given before the urine culture was collected, which eradicates the bacteria. You were noted to have improvement in your nausea/vomiting.      SECONDARY DISCHARGE DIAGNOSES  Diagnosis: DIMITRI (acute kidney injury)  Assessment and Plan of Treatment: You were noted to have an increase in your kidney enzymes signifiying a kidney injury. This could be due to poor fluid intake and poor diet intake over the past week or the high muscle breakdown that was seen from your bloodwork. This kidney injury has improved with fluid hydration. Please follow with your PCP in 1 week to repeat labwork.    Diagnosis: Rhabdomyolysis  Assessment and Plan of Treatment: You were noted to have elevated muscle enzymes most likely because of deconditioning of your muscles. Fluids significantly improved the muscle enzyme levels.    Diagnosis: Cyst of left kidney  Assessment and Plan of Treatment: You were noted to have a cyst in your left kidney which was seen in prior imaging as well. Please follow with your PCP to determine next steps.    Diagnosis: Transaminitis  Assessment and Plan of Treatment: You were noted to have elevations in your liver enzymes. This could be due to increased tylenol intake that you have had. The liver enzymes were improving however please refrain from additional tylenol intake or over the counter medication use prior to consulting your PCP. Please stop taking your home medication simvastatin until your next labwork with PCP.     PRINCIPAL DISCHARGE DIAGNOSIS  Diagnosis: Acute cystitis  Assessment and Plan of Treatment: You came in with nausea/vomiting and your urine tested positive for an acute infection. We treated you with antibiotics for 3 days. Urine culture did not have any bacterial growth; it could be that the antibiotic was given before the urine culture was collected, which eradicates the bacteria. You were noted to have improvement in your nausea/vomiting.      SECONDARY DISCHARGE DIAGNOSES  Diagnosis: DIMITRI (acute kidney injury)  Assessment and Plan of Treatment: You were noted to have an increase in your kidney enzymes signifiying a kidney injury. This could be due to poor fluid intake and poor diet intake over the past week or the high muscle breakdown that was seen from your bloodwork. This kidney injury has improved with fluid hydration. Please follow with your PCP in 1 week to repeat labwork.    Diagnosis: Rhabdomyolysis  Assessment and Plan of Treatment: You were noted to have elevated muscle enzymes most likely because of deconditioning of your muscles. Fluids significantly improved the muscle enzyme levels.    Diagnosis: Cyst of left kidney  Assessment and Plan of Treatment: You were noted to have a cyst in your left kidney which was seen in prior imaging as well. Please follow with your nephrologist for monitoring the cyst as well as to determine next steps.    Diagnosis: Transaminitis  Assessment and Plan of Treatment: You were noted to have elevations in your liver enzymes. This could be due to increased tylenol intake that you have had. The liver enzymes were improving however please refrain from additional tylenol intake or over the counter medication use prior to consulting your PCP. Please stop taking your home medication simvastatin until your next labwork with PCP.   You were found to have liver cysts. Please follow with the gastroenterology team to determine next steps.

## 2022-01-18 NOTE — PROGRESS NOTE ADULT - SUBJECTIVE AND OBJECTIVE BOX
*****************************************  Nasra Arguello MD I PGY1  Internal Medicine  Pager NS: [phone] I [facility]: [phone]  *****************************************      Patient is a 89y old  Female who presents with a chief complaint of weakness, UTI (17 Jan 2022 06:38)      SUBJECTIVE :      MEDICATIONS  (STANDING):  ATENolol  Tablet 25 milliGRAM(s) Oral <User Schedule>  ATENolol  Tablet 50 milliGRAM(s) Oral daily  cholecalciferol 400 Unit(s) Oral daily  ferrous    sulfate 325 milliGRAM(s) Oral daily  heparin   Injectable 5000 Unit(s) SubCutaneous every 8 hours  losartan 100 milliGRAM(s) Oral daily  pantoprazole    Tablet 40 milliGRAM(s) Oral before breakfast  piperacillin/tazobactam IVPB.. 3.375 Gram(s) IV Intermittent every 12 hours  sodium chloride 0.9%. 1000 milliLiter(s) (75 mL/Hr) IV Continuous <Continuous>    MEDICATIONS  (PRN):      CAPILLARY BLOOD GLUCOSE        I&O's Summary    16 Jan 2022 07:01  -  17 Jan 2022 07:00  --------------------------------------------------------  IN: 0 mL / OUT: 650 mL / NET: -650 mL    17 Jan 2022 07:01  -  18 Jan 2022 06:48  --------------------------------------------------------  IN: 0 mL / OUT: 1200 mL / NET: -1200 mL        PHYSICAL EXAM:  Vital Signs Last 24 Hrs  T(C): 36.7 (18 Jan 2022 06:03), Max: 37.3 (17 Jan 2022 20:35)  T(F): 98.1 (18 Jan 2022 06:03), Max: 99.2 (17 Jan 2022 20:35)  HR: 74 (18 Jan 2022 06:03) (74 - 92)  BP: 183/66 (18 Jan 2022 06:03) (129/43 - 183/66)  BP(mean): 98 (18 Jan 2022 06:03) (88 - 98)  RR: 18 (18 Jan 2022 06:03) (16 - 18)  SpO2: 95% (18 Jan 2022 06:03) (95% - 98%)    GENERAL: NAD, lying in bed comfortably  EYES: EOMI, conjunctiva and sclera clear  ENT: Moist mucous membranes  CHEST/LUNG: Clear to auscultation bilaterally; No rales, rhonchi, wheezing, or rubs. Unlabored respirations  HEART: Regular rate and rhythm; No murmurs, rubs, or gallops  ABDOMEN: BSx4; Soft, nontender, nondistended, + palpated abdominal nodule on L. abdomen  : no suprapubic or flank tenderness   EXTREMITIES:  2+ Peripheral Pulses, brisk capillary refill. No clubbing, cyanosis, or edema  NERVOUS SYSTEM:  A&Ox3, no focal deficits. sensation intact, strength 5/5 in B/L UE and LE   SKIN: No rashes or lesions      LABS:                        9.0    12.53 )-----------( 223      ( 17 Jan 2022 07:08 )             27.7     01-17    138  |  108<H>  |  18  ----------------------------<  125<H>  4.2   |  20<L>  |  1.27    Ca    8.3<L>      17 Jan 2022 07:08  Phos  2.1     01-17  Mg     1.70     01-17    TPro  6.1  /  Alb  3.7  /  TBili  0.7  /  DBili  x   /  AST  153<H>  /  ALT  407<H>  /  AlkPhos  139<H>  01-17      CARDIAC MARKERS ( 16 Jan 2022 11:05 )  x     / x     / 1201 U/L / x     / 7.9 ng/mL          Culture - Blood (collected 16 Jan 2022 07:16)  Source: .Blood Blood-Peripheral  Preliminary Report (17 Jan 2022 08:01):    No growth to date.    Culture - Blood (collected 16 Jan 2022 07:15)  Source: .Blood Blood-Peripheral  Preliminary Report (17 Jan 2022 08:01):    No growth to date.    Culture - Urine (collected 16 Jan 2022 03:00)  Source: Clean Catch Clean Catch (Midstream)  Final Report (17 Jan 2022 07:35):    No growth        RADIOLOGY & ADDITIONAL TESTS:  Results Reviewed: Y  Imaging Personally Reviewed:  Electrocardiogram Personally Reviewed:    COORDINATION OF CARE:  Care Discussed with Consultants/Other Providers [Y/N]:  Prior or Outpatient Records Reviewed [Y/N]:     *****************************************  Nasra Arguello MD I PGY1  Internal Medicine  Pager NS: [phone] I [facility]: [phone]  *****************************************      Patient is a 89y old  Female who presents with a chief complaint of weakness, UTI (17 Jan 2022 06:38)      SUBJECTIVE : NAEO. Pt seen and examined at bedside. Pt wants to go home. Notes her nausea has improved significantly.     Denies CP, SOB, fever/chills, dysuria, hematuria, diarrhea/constipation.       MEDICATIONS  (STANDING):  ATENolol  Tablet 25 milliGRAM(s) Oral <User Schedule>  ATENolol  Tablet 50 milliGRAM(s) Oral daily  cholecalciferol 400 Unit(s) Oral daily  ferrous    sulfate 325 milliGRAM(s) Oral daily  heparin   Injectable 5000 Unit(s) SubCutaneous every 8 hours  losartan 100 milliGRAM(s) Oral daily  pantoprazole    Tablet 40 milliGRAM(s) Oral before breakfast  piperacillin/tazobactam IVPB.. 3.375 Gram(s) IV Intermittent every 12 hours  sodium chloride 0.9%. 1000 milliLiter(s) (75 mL/Hr) IV Continuous <Continuous>    MEDICATIONS  (PRN):      CAPILLARY BLOOD GLUCOSE        I&O's Summary    16 Jan 2022 07:01  -  17 Jan 2022 07:00  --------------------------------------------------------  IN: 0 mL / OUT: 650 mL / NET: -650 mL    17 Jan 2022 07:01  -  18 Jan 2022 06:48  --------------------------------------------------------  IN: 0 mL / OUT: 1200 mL / NET: -1200 mL        PHYSICAL EXAM:  Vital Signs Last 24 Hrs  T(C): 36.7 (18 Jan 2022 06:03), Max: 37.3 (17 Jan 2022 20:35)  T(F): 98.1 (18 Jan 2022 06:03), Max: 99.2 (17 Jan 2022 20:35)  HR: 74 (18 Jan 2022 06:03) (74 - 92)  BP: 183/66 (18 Jan 2022 06:03) (129/43 - 183/66)  BP(mean): 98 (18 Jan 2022 06:03) (88 - 98)  RR: 18 (18 Jan 2022 06:03) (16 - 18)  SpO2: 95% (18 Jan 2022 06:03) (95% - 98%)    GENERAL: NAD, lying in bed comfortably  EYES: EOMI, conjunctiva and sclera clear  ENT: Moist mucous membranes  CHEST/LUNG: Clear to auscultation bilaterally; No rales, rhonchi, wheezing, or rubs. Unlabored respirations  HEART: Regular rate and rhythm; No murmurs, rubs, or gallops  ABDOMEN: BSx4; Soft, nontender, nondistended, + palpated abdominal nodule on L. abdomen  : no suprapubic or flank tenderness   EXTREMITIES:  2+ Peripheral Pulses, brisk capillary refill. No clubbing, cyanosis, or edema  NERVOUS SYSTEM:  A&Ox3, no focal deficits. sensation intact, strength 5/5 in B/L UE and LE   SKIN: No rashes or lesions      LABS:                        9.0    12.53 )-----------( 223      ( 17 Jan 2022 07:08 )             27.7     01-17    138  |  108<H>  |  18  ----------------------------<  125<H>  4.2   |  20<L>  |  1.27    Ca    8.3<L>      17 Jan 2022 07:08  Phos  2.1     01-17  Mg     1.70     01-17    TPro  6.1  /  Alb  3.7  /  TBili  0.7  /  DBili  x   /  AST  153<H>  /  ALT  407<H>  /  AlkPhos  139<H>  01-17      CARDIAC MARKERS ( 16 Jan 2022 11:05 )  x     / x     / 1201 U/L / x     / 7.9 ng/mL          Culture - Blood (collected 16 Jan 2022 07:16)  Source: .Blood Blood-Peripheral  Preliminary Report (17 Jan 2022 08:01):    No growth to date.    Culture - Blood (collected 16 Jan 2022 07:15)  Source: .Blood Blood-Peripheral  Preliminary Report (17 Jan 2022 08:01):    No growth to date.    Culture - Urine (collected 16 Jan 2022 03:00)  Source: Clean Catch Clean Catch (Midstream)  Final Report (17 Jan 2022 07:35):    No growth        RADIOLOGY & ADDITIONAL TESTS:  Results Reviewed: Y  Imaging Personally Reviewed:  Electrocardiogram Personally Reviewed:    COORDINATION OF CARE:  Care Discussed with Consultants/Other Providers [Y/N]:  Prior or Outpatient Records Reviewed [Y/N]:

## 2022-01-18 NOTE — PROGRESS NOTE ADULT - PROBLEM SELECTOR PLAN 1
Pt presenting with nonspecific sxs found to have +LE, pyuria and few bacteria in the urine along with leukocytosis with neutrophilia. Denies dysuria or changes in urinary pattern  temp of 100.6 in the ED   s/p zosyn and vanc in the ED  lactate: 1.6   UCx: neg; leukocytosis downtrending     - c/w zosyn (1/16-1/18)  - monitor fever curve, leukocytosis

## 2022-01-18 NOTE — PROGRESS NOTE ADULT - PROBLEM SELECTOR PLAN 8
Hgb: 9 with unclear baseline   normocytic anemia  iron: low  retic count: 1.3%    ferritin: 348  reports good compliance with colonoscopies with normal results    - iron supplementation

## 2022-01-18 NOTE — DISCHARGE NOTE PROVIDER - NSDCFUADDAPPT_GEN_ALL_CORE_FT
Please follow up with your PCP in 1 week for the management of your chronic conditions.  Please follow up with your PCP in 1 week for the management of your chronic conditions. Hold off on taking rosuvastatin till your next appointment with your PCP.     Please follow up with the GI doctor for the liver lesions seen on CT. You can call [phone] to make an appointment.     Please follow up with the nephrology to monitor the kidney cyst.

## 2022-01-18 NOTE — DISCHARGE NOTE PROVIDER - NSFOLLOWUPCLINICS_GEN_ALL_ED_FT
[facility]  Nephrology  [address] [place] [address], [address] [place] [address]  Phone: [phone]  Fax:     [facility] [place]  Gastroenterology  [address] [place] [address]  Phone: [phone]  Fax:

## 2022-01-18 NOTE — PROGRESS NOTE ADULT - PROBLEM SELECTOR PLAN 5
AST/ALT: 733/897 on arrival   pt notes taking 2 tylenols/daily for the past month   CT Abd: Multiple hepatic hypodensities are seen, measuring up to 7.1 cm, characterized as cysts on subsequent ultrasound  US: No evidence of acute cholecystitis. No intra or extrahepatic biliary ductal dilatation.    - continue to monitor AST/ALT AST/ALT: 733/897 on arrival   pt notes taking 2 ES tylenols/daily for the past month   CT Abd: Multiple hepatic hypodensities are seen, measuring up to 7.1 cm, characterized as cysts on subsequent ultrasound  US: No evidence of acute cholecystitis. No intra or extrahepatic biliary ductal dilatation.    - continue to monitor AST/ALT

## 2022-01-18 NOTE — DISCHARGE NOTE PROVIDER - NSDCCPTREATMENT_GEN_ALL_CORE_FT
PRINCIPAL PROCEDURE  Procedure: CT abdomen wo/w con  Findings and Treatment: FINDINGS:  LOWER CHEST: Bibasilar atelectasis. Coronary artery calcifications are seen.  LIVER: Multiple hepatic hypodensities are seen, measuring up to 7.1 cm, characterized as cysts on subsequent ultrasound. These have slightly increased in size since 2009.  BILE DUCTS: No intra or extra hepatic biliary ductal dilatation.  GALLBLADDER: No cholelithiasis.  SPLEEN: Within normal limits.  PANCREAS: Within normal limits.  ADRENALS: Within normal limits.  KIDNEYS/URETERS: A 6 mm high density nodule is seen projecting from the lower pole of the left kidney. No hydronephrosis or nephrolithiasis.  BLADDER: Within normal limits.  REPRODUCTIVE ORGANS: A 4.7 cm left adnexal cyst is seen.  BOWEL: No bowel obstruction. Colonic diverticulosis without diverticulitis. Appendix is normal.  PERITONEUM: No ascites.  VESSELS: There is dense atherosclerotic calcification involving the aorta and iliac arteries.  RETROPERITONEUM/LYMPH NODES: No lymphadenopathy.  ABDOMINAL WALL: Subcutaneous edema is seen throughout the gluteal regions bilaterally.  BONES: Age-indeterminate but likely chronic compression deformities of T11 and L2. Severe degenerative changes of the spine. S-shaped thoracolumbar scoliosis.  IMPRESSION:  No hydronephrosis or evidence of renal calculus. No posttraumatic sequela on this noncontrast study  4.7 cm left adnexal cyst.  Indeterminant high density cortically-based left renal lesion possibly hemorrhagic or proteinaceous cyst. If clinically appropriate, renal ultrasound could be considered for further evaluation on a nonemergent basis.      SECONDARY PROCEDURE  Procedure: Ultrasound of kidney and bladder  Findings and Treatment: FINDINGS:  Right kidney: 8.3 cm. No hydronephrosis or calculi. 1 cm simple cyst.  Left kidney: 8.7 cm. No hydronephrosis or calculi. 0.6 cm cortical-based simple cyst.  Urinary bladder: Within normal limits.  IMPRESSION:  0.6 cm simple cyst within the left kidney corresponding to the lesion seen on recent CT.    Procedure: CT head wo con  Findings and Treatment: FINDINGS:  There is no acute intra-axial or extra-axial hemorrhage. There is no mass effect or shift of the midline. The basal cisterns are not effaced. There is cerebral and cerebellar volume loss with prominence of the ventricles, sulci, and cerebellar folia. There are mild chronic ischemic changes in the frontoparietal white matter. There are atherosclerotic calcifications of the intracranial carotid arteries and vertebrobasilar system.  There is no significant scalp soft tissue swelling or scalp hematoma. The skull base and bony calvarium are intact. The visualized paranasal sinuses and tympanic/mastoid cavities are clear. There is evidence of bilateral lens surgery.  IMPRESSION:  No acute intracranial hemorrhage, mass effect, or acute osseous fracture.  CT Thoracic Spine:   IMPRESSION:  Indeterminate age compression deformities of the inferior endplate of T11 and superior endplate of L2 with mild compression of the vertebral bodies and minimal bony retropulsion. No subluxation. Comparison is made to a prior exam from

## 2022-01-18 NOTE — PROGRESS NOTE ADULT - PROBLEM SELECTOR PLAN 4
CK: 1600 on arrival --> 1200  pt notes weakness and immobility last week     - c/w fluids  - trend CK CK: 1600 on arrival --> 1200 -->120  pt notes weakness and immobility last week     RESOLVED s/p fluids

## 2022-01-18 NOTE — PROGRESS NOTE ADULT - PROBLEM SELECTOR PLAN 2
Cr: 1.5 on arrival with unknown baseline  likely pre-renal from poor PO intake   FeNa: prerenal  SCr no wnl     - c/w fluids   - Monitor SCr Cr: 1.5 on arrival with unknown baseline  likely pre-renal from poor PO intake   FeNa: prerenal  SCr: wnl    RESOLVED

## 2022-01-18 NOTE — PROGRESS NOTE ADULT - PROBLEM SELECTOR PLAN 3
Trops: 188 -->190 --> 140  EKG: no acute ST or T wave changes and pt not having CP   CKMB assay: 0.7%--> less likely cardiac   likely demand ischemia     -F/u TTE Trops: 188 -->190 --> 140  EKG: no acute ST or T wave changes and pt not having CP   CKMB assay: 0.7%--> less likely cardiac   likely demand ischemia   TTE: EF: 67%; Normal left ventricular systolic function. No segmental wall motion abnormalities.

## 2022-01-18 NOTE — DISCHARGE NOTE PROVIDER - CARE PROVIDER_API CALL
Kody Jaimes  GASTROENTEROLOGY  [address] [place] [address], [address]  [place], [place] [id number]  Phone: [phone]  Fax: [phone]  Established Patient  Follow Up Time: 1 week

## 2022-01-18 NOTE — PROGRESS NOTE ADULT - ATTENDING COMMENTS
Patient seen and examined at bedside. In brief, 89 Y/O F with HTN, HLD, osteoarthritis presenting with malaise, nausea/vomiting found to have UTI, SHAY, rhabdo.     A/P UTI , N/V, Hyponatremia and Rhabdo  # ID c/w Zosyn day #3,  f/u urine and blood cultures- NTD  # CVS no chest pain , trend hstrop and monitor cpk, ckmb from 1/15 and 1/16, % MB 0.7- WNL. 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 vit d level and c/w vitamin D po.  # Dvt proph- hep sq.  called son Mr SILVESTRE Mehta at [phone] and left message. Also contacted on teams    pt eval for ambulation: cleared for home. Medically stable for discharge will follow up with son re: safe discharge planning as patient lives alone. Potentially today vs. later on in the week. 35 minutes spent discharge planning.

## 2022-01-18 NOTE — DISCHARGE NOTE PROVIDER - HOSPITAL COURSE
H&P:   bathroom at night without lights 6 days ago. No LOC, no headaches. Notes poor PO intake over the past week and improvement in symptoms after IV fluid hydration. No sick contacts. No recent travel hx. Pt notes chronic L. flank pain which she has been experiencing for years; relieved with tylenol. Has been taking tylenol 650 x2 tabs daily. Denies CP, SOB, abdominal pain, dysuria, hematuria, diarrhea/constipation. COVID vaccinatedx3. In the ED, found to be febrile (100.6) and received tylenol. Put on 2L NC. Received zosyn, vanc and 2L bolus in the ED.     Hospital Course:   UA was + for LE and pt was continued on zosyn for 3 days. UCx was neg; could be due to collection after antibiotic initiation. Labs significant for prerenal DIMITRI which improved with IV fluid hydration. CK noted to be elevated to 1600 and improved to 120 after fluid initiation. EKG with no ischemic changes however trops were elevated on arrival and was monitored until it was downtrending. Echo showed EF: 67%;normal left ventricular systolic function. No segmental wall motion abnormalities. CT Head: neg for acute pathology.     Patient noted to have acute hepatitis with AST/ALT: 733/897. Pt noted to have been taking OTC tylenol daily. RUQ US: No evidence of acute cholecystitis. No intra or extrahepatic biliary ductal dilatation. CT Abd showed multiple hepatic hypodensities are seen, measuring up to 7.1 cm, characterized as cysts on subsequent ultrasound. Kidney US: showed 0.6 cm simple cyst within left kidney.    Pt is HD stable and medically optimized for discharge for close follow-up with PCP and    H&P:   bathroom at night without lights 6 days ago. No LOC, no headaches. Notes poor PO intake over the past week and improvement in symptoms after IV fluid hydration. No sick contacts. No recent travel hx. Pt notes chronic L. flank pain which she has been experiencing for years; relieved with tylenol. Has been taking tylenol 650 x2 tabs daily. Denies CP, SOB, abdominal pain, dysuria, hematuria, diarrhea/constipation. COVID vaccinatedx3. In the ED, found to be febrile (100.6) and received tylenol. Put on 2L NC. Received zosyn, vanc and 2L bolus in the ED.     Hospital Course:   UA was + for LE and pt was continued on zosyn for 3 days. UCx was neg; could be due to collection after antibiotic initiation. Labs significant for prerenal DIMITRI which improved with IV fluid hydration. CK noted to be elevated to 1600 and improved to 120 after fluid initiation. EKG with no ischemic changes however trops were elevated on arrival and was monitored until it was downtrending. Echo showed EF: 67%;normal left ventricular systolic function. No segmental wall motion abnormalities. CT Head: neg for acute pathology.     Patient noted to have acute hepatitis with AST/ALT: 733/897. Pt noted to have been taking OTC tylenol daily. RUQ US: No evidence of acute cholecystitis. No intra or extrahepatic biliary ductal dilatation. CT Abd showed multiple hepatic hypodensities are seen, measuring up to 7.1 cm, characterized as cysts on subsequent ultrasound to be followed up as OP.  Kidney US: showed 0.6 cm simple cyst within left kidney.     Pt is HD stable and medically optimized for discharge for close follow-up with PCP and    H&P:   bathroom at night without lights 6 days ago. No LOC, no headaches. Notes poor PO intake over the past week and improvement in symptoms after IV fluid hydration. No sick contacts. No recent travel hx. Pt notes chronic L. flank pain which she has been experiencing for years; relieved with tylenol. Has been taking tylenol 650 x2 tabs daily. Denies CP, SOB, abdominal pain, dysuria, hematuria, diarrhea/constipation. COVID vaccinatedx3. In the ED, found to be febrile (100.6) and received tylenol. Put on 2L NC. Received zosyn, vanc and 2L bolus in the ED.     Hospital Course:   UA was + for LE and pt was continued on zosyn for 3 days. UCx was neg; could be due to collection after antibiotic initiation. Labs significant for prerenal DIMITRI which improved with IV fluid hydration. CK noted to be elevated to 1600 and improved to 120 after fluid initiation. EKG with no ischemic changes however trops were elevated on arrival and was monitored until it was downtrending. Echo showed EF: 67%;normal left ventricular systolic function. No segmental wall motion abnormalities. CT Head: neg for acute pathology.     Patient noted to have acute hepatitis with AST/ALT: 733/897. Pt noted to have been taking OTC tylenol daily. RUQ US: No evidence of acute cholecystitis. No intra or extrahepatic biliary ductal dilatation. CT Abd showed multiple hepatic hypodensities are seen, measuring up to 7.1 cm, characterized as cysts on subsequent ultrasound to be followed up as OP.  Kidney US: showed 0.6 cm simple cyst within left kidney.     Pt is HD stable and medically optimized for discharge for close follow-up with PCP, nephrology team and GI team   H&P:   bathroom at night without lights 6 days ago. No LOC, no headaches. Notes poor PO intake over the past week and improvement in symptoms after IV fluid hydration. No sick contacts. No recent travel hx. Pt notes chronic L. flank pain which she has been experiencing for years; relieved with tylenol. Has been taking tylenol 650 x2 tabs daily. Denies CP, SOB, abdominal pain, dysuria, hematuria, diarrhea/constipation. COVID vaccinatedx3. In the ED, found to be febrile (100.6) and received tylenol. Put on 2L NC. Received zosyn, vanc and 2L bolus in the ED.     Hospital Course:   UA was + for LE and pt was continued on zosyn for 3 days. UCx was neg; could be due to collection after antibiotic initiation. Labs significant for prerenal DIMITRI which improved with IV fluid hydration. CK noted to be elevated to 1600 and improved to 120 after fluid initiation. EKG with no ischemic changes however trops were elevated on arrival and was monitored until it was downtrending. Echo showed EF: 67%;normal left ventricular systolic function. No segmental wall motion abnormalities. CT Head: neg for acute pathology.     Patient noted to have acute hepatitis with AST/ALT: 733/897. Pt noted to have been taking OTC tylenol daily. Pt noted to have significant improvement in AST/ALT over the hospital course with halting anti-toxic medications. RUQ US: No evidence of acute cholecystitis. No intra or extrahepatic biliary ductal dilatation. CT Abd showed multiple hepatic hypodensities are seen, measuring up to 7.1 cm, characterized as cysts on subsequent ultrasound to be followed up as OP.  Kidney US: showed 0.6 cm simple cyst within left kidney.     Pt is HD stable and medically optimized for discharge for close follow-up with PCP, nephrology team and GI team

## 2022-01-18 NOTE — DISCHARGE NOTE PROVIDER - NSDCMRMEDTOKEN_GEN_ALL_CORE_FT
AcipHex 20 mg oral delayed release tablet: 1 tab(s) orally once a day  atenolol 25 mg oral tablet: 1 tab(s) orally 3 times a day  eszopiclone 1 mg oral tablet: 1 tab(s) orally once a day (at bedtime)  irbesartan 300 mg oral tablet: 1 tab(s) orally once a day  simvastatin 20 mg oral tablet: 1 tab(s) orally once a day (at bedtime)   AcipHex 20 mg oral delayed release tablet: 1 tab(s) orally once a day  atenolol 25 mg oral tablet: 1 tab(s) orally 3 times a day  eszopiclone 1 mg oral tablet: 1 tab(s) orally once a day (at bedtime)  ferrous sulfate 325 mg (65 mg elemental iron) oral tablet: 1 tab(s) orally once a day  irbesartan 300 mg oral tablet: 1 tab(s) orally once a day

## 2022-01-19 ENCOUNTER — TRANSCRIPTION ENCOUNTER (OUTPATIENT)
Age: 87
End: 2022-01-19

## 2022-01-19 VITALS
SYSTOLIC BLOOD PRESSURE: 154 MMHG | OXYGEN SATURATION: 99 % | DIASTOLIC BLOOD PRESSURE: 59 MMHG | HEART RATE: 71 BPM | TEMPERATURE: 98 F | RESPIRATION RATE: 16 BRPM

## 2022-01-19 LAB
ALBUMIN SERPL ELPH-MCNC: 3.7 G/DL — SIGNIFICANT CHANGE UP (ref 3.3–5)
ALP SERPL-CCNC: 121 U/L — HIGH (ref 40–120)
ALT FLD-CCNC: 203 U/L — HIGH (ref 4–33)
ANION GAP SERPL CALC-SCNC: 10 MMOL/L — SIGNIFICANT CHANGE UP (ref 7–14)
AST SERPL-CCNC: 36 U/L — HIGH (ref 4–32)
BASOPHILS # BLD AUTO: 0.06 K/UL — SIGNIFICANT CHANGE UP (ref 0–0.2)
BASOPHILS NFR BLD AUTO: 0.5 % — SIGNIFICANT CHANGE UP (ref 0–2)
BILIRUB SERPL-MCNC: 0.6 MG/DL — SIGNIFICANT CHANGE UP (ref 0.2–1.2)
BUN SERPL-MCNC: 16 MG/DL — SIGNIFICANT CHANGE UP (ref 7–23)
CALCIUM SERPL-MCNC: 8 MG/DL — LOW (ref 8.4–10.5)
CHLORIDE SERPL-SCNC: 104 MMOL/L — SIGNIFICANT CHANGE UP (ref 98–107)
CO2 SERPL-SCNC: 24 MMOL/L — SIGNIFICANT CHANGE UP (ref 22–31)
CREAT SERPL-MCNC: 1.28 MG/DL — SIGNIFICANT CHANGE UP (ref 0.5–1.3)
EOSINOPHIL # BLD AUTO: 0.24 K/UL — SIGNIFICANT CHANGE UP (ref 0–0.5)
EOSINOPHIL NFR BLD AUTO: 1.9 % — SIGNIFICANT CHANGE UP (ref 0–6)
GLUCOSE SERPL-MCNC: 116 MG/DL — HIGH (ref 70–99)
HCT VFR BLD CALC: 28.5 % — LOW (ref 34.5–45)
HGB BLD-MCNC: 9.4 G/DL — LOW (ref 11.5–15.5)
IANC: 9.67 K/UL — HIGH (ref 1.5–8.5)
IMM GRANULOCYTES NFR BLD AUTO: 1.2 % — SIGNIFICANT CHANGE UP (ref 0–1.5)
LYMPHOCYTES # BLD AUTO: 1.57 K/UL — SIGNIFICANT CHANGE UP (ref 1–3.3)
LYMPHOCYTES # BLD AUTO: 12.1 % — LOW (ref 13–44)
MAGNESIUM SERPL-MCNC: 1.8 MG/DL — SIGNIFICANT CHANGE UP (ref 1.6–2.6)
MCHC RBC-ENTMCNC: 30.9 PG — SIGNIFICANT CHANGE UP (ref 27–34)
MCHC RBC-ENTMCNC: 33 GM/DL — SIGNIFICANT CHANGE UP (ref 32–36)
MCV RBC AUTO: 93.8 FL — SIGNIFICANT CHANGE UP (ref 80–100)
MONOCYTES # BLD AUTO: 1.23 K/UL — HIGH (ref 0–0.9)
MONOCYTES NFR BLD AUTO: 9.5 % — SIGNIFICANT CHANGE UP (ref 2–14)
NEUTROPHILS # BLD AUTO: 9.67 K/UL — HIGH (ref 1.8–7.4)
NEUTROPHILS NFR BLD AUTO: 74.8 % — SIGNIFICANT CHANGE UP (ref 43–77)
NRBC # BLD: 0 /100 WBCS — SIGNIFICANT CHANGE UP
NRBC # FLD: 0 K/UL — SIGNIFICANT CHANGE UP
PHOSPHATE SERPL-MCNC: 2.5 MG/DL — SIGNIFICANT CHANGE UP (ref 2.5–4.5)
PLATELET # BLD AUTO: 296 K/UL — SIGNIFICANT CHANGE UP (ref 150–400)
POTASSIUM SERPL-MCNC: 4 MMOL/L — SIGNIFICANT CHANGE UP (ref 3.5–5.3)
POTASSIUM SERPL-SCNC: 4 MMOL/L — SIGNIFICANT CHANGE UP (ref 3.5–5.3)
PROT SERPL-MCNC: 6.9 G/DL — SIGNIFICANT CHANGE UP (ref 6–8.3)
RBC # BLD: 3.04 M/UL — LOW (ref 3.8–5.2)
RBC # FLD: 13 % — SIGNIFICANT CHANGE UP (ref 10.3–14.5)
SODIUM SERPL-SCNC: 138 MMOL/L — SIGNIFICANT CHANGE UP (ref 135–145)
WBC # BLD: 12.93 K/UL — HIGH (ref 3.8–10.5)
WBC # FLD AUTO: 12.93 K/UL — HIGH (ref 3.8–10.5)

## 2022-01-19 PROCEDURE — 99239 HOSP IP/OBS DSCHRG MGMT >30: CPT

## 2022-01-19 RX ORDER — LOSARTAN POTASSIUM 100 MG/1
100 TABLET, FILM COATED ORAL DAILY
Refills: 0 | Status: DISCONTINUED | OUTPATIENT
Start: 2022-01-19 | End: 2022-01-19

## 2022-01-19 RX ORDER — FERROUS SULFATE 325(65) MG
1 TABLET ORAL
Qty: 0 | Refills: 0 | DISCHARGE
Start: 2022-01-19

## 2022-01-19 RX ORDER — LOSARTAN POTASSIUM 100 MG/1
100 TABLET, FILM COATED ORAL ONCE
Refills: 0 | Status: DISCONTINUED | OUTPATIENT
Start: 2022-01-19 | End: 2022-01-19

## 2022-01-19 RX ORDER — SIMVASTATIN 20 MG/1
1 TABLET, FILM COATED ORAL
Qty: 0 | Refills: 0 | DISCHARGE

## 2022-01-19 RX ADMIN — Medication 100 MILLIGRAM(S): at 03:44

## 2022-01-19 RX ADMIN — PANTOPRAZOLE SODIUM 40 MILLIGRAM(S): 20 TABLET, DELAYED RELEASE ORAL at 06:56

## 2022-01-19 RX ADMIN — HEPARIN SODIUM 5000 UNIT(S): 5000 INJECTION INTRAVENOUS; SUBCUTANEOUS at 14:06

## 2022-01-19 RX ADMIN — LOSARTAN POTASSIUM 100 MILLIGRAM(S): 100 TABLET, FILM COATED ORAL at 06:56

## 2022-01-19 RX ADMIN — Medication 200 MILLIGRAM(S): at 02:04

## 2022-01-19 RX ADMIN — Medication 1 PACKET(S): at 06:56

## 2022-01-19 RX ADMIN — HEPARIN SODIUM 5000 UNIT(S): 5000 INJECTION INTRAVENOUS; SUBCUTANEOUS at 06:57

## 2022-01-19 RX ADMIN — PIPERACILLIN AND TAZOBACTAM 25 GRAM(S): 4; .5 INJECTION, POWDER, LYOPHILIZED, FOR SOLUTION INTRAVENOUS at 06:57

## 2022-01-19 RX ADMIN — Medication 400 UNIT(S): at 12:21

## 2022-01-19 RX ADMIN — Medication 325 MILLIGRAM(S): at 12:28

## 2022-01-19 RX ADMIN — ATENOLOL 25 MILLIGRAM(S): 25 TABLET ORAL at 09:18

## 2022-01-19 NOTE — DISCHARGE NOTE NURSING/CASE MANAGEMENT/SOCIAL WORK - NSDCPNINST_GEN_ALL_CORE
Call MD with any changes increased signs of infection or with signs of sepsis, fever/shaking chills. Continue to drink plenty of fluids, and take all of Antibiotics as prescribed until completed. Follow-up with MD as instructed for continuity of care.  Remember safety and fall prevention measures as reviewed in order to prevent injury.  Follow-up with your doctor in the office as instructed for continuity of care after discharge.

## 2022-01-19 NOTE — DISCHARGE NOTE NURSING/CASE MANAGEMENT/SOCIAL WORK - NSDPDISTO_GEN_ALL_CORE
Home Pt A+OX 4, VS stable, Afebrile. Voiding. positive Bowel sounds, wai po diet, no nausea reported. Seen by MD and cleared for Dc to home as per safe Dc plan./Home

## 2022-01-19 NOTE — PROGRESS NOTE ADULT - PROBLEM SELECTOR PLAN 2
Cr: 1.5 on arrival with unknown baseline  likely pre-renal from poor PO intake   FeNa: prerenal  SCr: wnl    RESOLVED Cr: 1.5 on arrival with unknown baseline  likely pre-renal from poor PO intake   FeNa: prerenal  SCr: wnl    RESOLVED  encouraged PO intake and fluid intake

## 2022-01-19 NOTE — PROGRESS NOTE ADULT - PROBLEM SELECTOR PLAN 7
BP noted to 170/70 in the ED    - c/w home meds

## 2022-01-19 NOTE — DISCHARGE NOTE NURSING/CASE MANAGEMENT/SOCIAL WORK - NSDCPEFALRISK_GEN_ALL_CORE
For information on Fall & Injury Prevention, visit: [email or web address] OR  [email or web address] OR  https://www.cdc.gov/steadi/patient.html

## 2022-01-19 NOTE — PROGRESS NOTE ADULT - PROBLEM SELECTOR PLAN 3
Trops: 188 -->190 --> 140  EKG: no acute ST or T wave changes and pt not having CP   CKMB assay: 0.7%--> less likely cardiac   likely demand ischemia   TTE: EF: 67%; Normal left ventricular systolic function. No segmental wall motion abnormalities.

## 2022-01-19 NOTE — PROGRESS NOTE ADULT - ASSESSMENT
89 Y/O F with HTN, HLD, osteoathritis presenting with malaise, nausea/vomiting found to have UTI, DIMITRI, rhabdo
89 Y/O F with HTN, HLD, osteoathritis presenting with malaise, nausea/vomiting found to have UTI, DIMITRI, rhabdo
89 Y/O F with HTN, HLD, osteoathritis presenting with malaise, nausea/vomiting found to have UTI, DIMITRI, rhabdo

## 2022-01-19 NOTE — PROGRESS NOTE ADULT - ATTENDING COMMENTS
Patient seen and examined at bedside. In brief, 89 Y/O F with HTN, HLD, osteoarthritis presenting with malaise, nausea/vomiting found to have UTI, SHAY, rhabdo.     A/P UTI , N/V, Hyponatremia and Rhabdo  # ID s/p  Zosyn   f/u urine and blood cultures- NTD  # CVS no chest pain , trend hstrop and monitor cpk, ckmb from 1/15 and 1/16, % MB 0.7- WNL. TTE reviewed and noted with no wall motion abnormality   #CPK elevation most likely sec to Rhabdo- s/p  IVF hydration and monitor cpk 1606--> 1201, now downtrended to 160   # Shay- now resolved s/p  ivf hydration  and monitor bun/ creat. Can re-start ARB on discharge   # Hyponatremia: on admission, now resovled   # L mid back pain- b/l on palpation L.R x weeks, tylenol prn mild pain, Ultram x1 tonight to asses if help with pain  back pain most likely secondary to osteopetrosis vs OA. vit d level and c/w vitamin D po.  # Dvt proph- hep sq.  updated  son Mr SILVESTRE Mehta at [phone]   Dispo  today 35 minutes spent discharge planning.

## 2022-01-19 NOTE — PROGRESS NOTE ADULT - PROBLEM SELECTOR PLAN 5
AST/ALT: 733/897 on arrival   pt notes taking 2 ES tylenols/daily for the past month   CT Abd: Multiple hepatic hypodensities are seen, measuring up to 7.1 cm, characterized as cysts on subsequent ultrasound  US: No evidence of acute cholecystitis. No intra or extrahepatic biliary ductal dilatation.    - continue to monitor AST/ALT

## 2022-01-19 NOTE — DISCHARGE NOTE NURSING/CASE MANAGEMENT/SOCIAL WORK - PATIENT PORTAL LINK FT
You can access the FollowMyHealth Patient Portal offered by [facility] by registering at the following website: [email or web address]. By joining [facility]’s FollowMyHealth portal, you will also be able to view your health information using other applications (apps) compatible with our system.

## 2022-01-19 NOTE — PROGRESS NOTE ADULT - PROBLEM SELECTOR PLAN 4
CK: 1600 on arrival --> 1200 -->120  pt notes weakness and immobility last week     RESOLVED s/p fluids

## 2022-01-19 NOTE — DISCHARGE NOTE NURSING/CASE MANAGEMENT/SOCIAL WORK - NSDCFUADDAPPT_GEN_ALL_CORE_FT
Please follow up with your PCP in 1 week for the management of your chronic conditions. Hold off on taking rosuvastatin till your next appointment with your PCP.     Please follow up with the GI doctor for the liver lesions seen on CT. You can call [phone] to make an appointment.     Please follow up with the nephrology to monitor the kidney cyst.

## 2022-01-19 NOTE — PROGRESS NOTE ADULT - SUBJECTIVE AND OBJECTIVE BOX
*****************************************  Nasra Arguello MD I PGY1  Internal Medicine  Pager NS: [phone] I [facility]: [phone]  *****************************************      Patient is a 89y old  Female who presents with a chief complaint of weakness, UTI (18 Jan 2022 15:00)      SUBJECTIVE :      MEDICATIONS  (STANDING):  ATENolol  Tablet 25 milliGRAM(s) Oral <User Schedule>  ATENolol  Tablet 50 milliGRAM(s) Oral <User Schedule>  cholecalciferol 400 Unit(s) Oral daily  ferrous    sulfate 325 milliGRAM(s) Oral daily  heparin   Injectable 5000 Unit(s) SubCutaneous every 8 hours  losartan 100 milliGRAM(s) Oral daily  pantoprazole    Tablet 40 milliGRAM(s) Oral before breakfast  piperacillin/tazobactam IVPB.. 3.375 Gram(s) IV Intermittent every 12 hours  potassium phosphate / sodium phosphate Powder (PHOS-NaK) 1 Packet(s) Oral two times a day    MEDICATIONS  (PRN):  benzocaine 15 mG/menthol 3.6 mG Lozenge 1 Lozenge Oral two times a day PRN Sore Throat  benzonatate 100 milliGRAM(s) Oral three times a day PRN Cough  guaiFENesin Oral Liquid (Sugar-Free) 200 milliGRAM(s) Oral every 6 hours PRN Cough      CAPILLARY BLOOD GLUCOSE        I&O's Summary    17 Jan 2022 07:01  -  18 Jan 2022 07:00  --------------------------------------------------------  IN: 900 mL / OUT: 1500 mL / NET: -600 mL        PHYSICAL EXAM:  Vital Signs Last 24 Hrs  T(C): 36.4 (18 Jan 2022 21:30), Max: 36.9 (18 Jan 2022 10:00)  T(F): 97.6 (18 Jan 2022 21:30), Max: 98.5 (18 Jan 2022 10:00)  HR: 73 (18 Jan 2022 21:30) (73 - 77)  BP: 171/61 (18 Jan 2022 21:30) (154/65 - 171/61)  BP(mean): --  RR: 18 (18 Jan 2022 21:30) (17 - 18)  SpO2: 97% (18 Jan 2022 21:30) (97% - 99%)      GENERAL: NAD, lying in bed comfortably  EYES: EOMI, conjunctiva and sclera clear  ENT: Moist mucous membranes  CHEST/LUNG: Clear to auscultation bilaterally; No rales, rhonchi, wheezing, or rubs. Unlabored respirations  HEART: Regular rate and rhythm; No murmurs, rubs, or gallops  ABDOMEN: BSx4; Soft, nontender, nondistended, + palpated abdominal nodule on L. abdomen  : no suprapubic or flank tenderness   EXTREMITIES:  2+ Peripheral Pulses, brisk capillary refill. No clubbing, cyanosis, or edema  NERVOUS SYSTEM:  A&Ox3, no focal deficits. sensation intact, strength 5/5 in B/L UE and LE   SKIN: No rashes or lesions    LABS:                        8.2    10.07 )-----------( 226      ( 18 Jan 2022 07:11 )             26.0     01-18    137  |  105  |  14  ----------------------------<  118<H>  4.3   |  22  |  1.15    Ca    7.6<L>      18 Jan 2022 07:11  Phos  2.2     01-18  Mg     1.50     01-18    TPro  6.0  /  Alb  3.3  /  TBili  0.6  /  DBili  x   /  AST  56<H>  /  ALT  254<H>  /  AlkPhos  117  01-18      CARDIAC MARKERS ( 18 Jan 2022 07:11 )  x     / x     / 160 U/L / x     / x              Culture - Blood (collected 16 Jan 2022 07:16)  Source: .Blood Blood-Peripheral  Preliminary Report (17 Jan 2022 08:01):    No growth to date.    Culture - Blood (collected 16 Jan 2022 07:15)  Source: .Blood Blood-Peripheral  Preliminary Report (17 Jan 2022 08:01):    No growth to date.        RADIOLOGY & ADDITIONAL TESTS:  Results Reviewed: Y  Imaging Personally Reviewed:  Electrocardiogram Personally Reviewed:    COORDINATION OF CARE:  Care Discussed with Consultants/Other Providers [Y/N]:  Prior or Outpatient Records Reviewed [Y/N]:     *****************************************  Nasra Arguello MD I PGY1  Internal Medicine  Pager NS: [phone] I [facility]: [phone]  *****************************************      Patient is a 89y old  Female who presents with a chief complaint of weakness, UTI (18 Jan 2022 15:00)      SUBJECTIVE : NAEO. Pt seen and examined at bedside. Wants to go home. Notes L. kidney pain has improved.     Denies CP, SOB, fever/chills, dysuria, hematuria, diarrhea/constipation.       MEDICATIONS  (STANDING):  ATENolol  Tablet 25 milliGRAM(s) Oral <User Schedule>  ATENolol  Tablet 50 milliGRAM(s) Oral <User Schedule>  cholecalciferol 400 Unit(s) Oral daily  ferrous    sulfate 325 milliGRAM(s) Oral daily  heparin   Injectable 5000 Unit(s) SubCutaneous every 8 hours  losartan 100 milliGRAM(s) Oral daily  pantoprazole    Tablet 40 milliGRAM(s) Oral before breakfast  piperacillin/tazobactam IVPB.. 3.375 Gram(s) IV Intermittent every 12 hours  potassium phosphate / sodium phosphate Powder (PHOS-NaK) 1 Packet(s) Oral two times a day    MEDICATIONS  (PRN):  benzocaine 15 mG/menthol 3.6 mG Lozenge 1 Lozenge Oral two times a day PRN Sore Throat  benzonatate 100 milliGRAM(s) Oral three times a day PRN Cough  guaiFENesin Oral Liquid (Sugar-Free) 200 milliGRAM(s) Oral every 6 hours PRN Cough      CAPILLARY BLOOD GLUCOSE        I&O's Summary    17 Jan 2022 07:01  -  18 Jan 2022 07:00  --------------------------------------------------------  IN: 900 mL / OUT: 1500 mL / NET: -600 mL        PHYSICAL EXAM:  Vital Signs Last 24 Hrs  T(C): 36.4 (18 Jan 2022 21:30), Max: 36.9 (18 Jan 2022 10:00)  T(F): 97.6 (18 Jan 2022 21:30), Max: 98.5 (18 Jan 2022 10:00)  HR: 73 (18 Jan 2022 21:30) (73 - 77)  BP: 171/61 (18 Jan 2022 21:30) (154/65 - 171/61)  BP(mean): --  RR: 18 (18 Jan 2022 21:30) (17 - 18)  SpO2: 97% (18 Jan 2022 21:30) (97% - 99%)      GENERAL: NAD, lying in bed comfortably  EYES: EOMI, conjunctiva and sclera clear  ENT: Moist mucous membranes  CHEST/LUNG: Clear to auscultation bilaterally; No rales, rhonchi, wheezing, or rubs. Unlabored respirations  HEART: Regular rate and rhythm; No murmurs, rubs, or gallops  ABDOMEN: BSx4; Soft, nontender, nondistended, + palpated abdominal nodule on L. abdomen  : no suprapubic or flank tenderness   EXTREMITIES:  2+ Peripheral Pulses, brisk capillary refill. No clubbing, cyanosis, or edema  NERVOUS SYSTEM:  A&Ox3, no focal deficits. sensation intact, strength 5/5 in B/L UE and LE   SKIN: No rashes or lesions    LABS:                        8.2    10.07 )-----------( 226      ( 18 Jan 2022 07:11 )             26.0     01-18    137  |  105  |  14  ----------------------------<  118<H>  4.3   |  22  |  1.15    Ca    7.6<L>      18 Jan 2022 07:11  Phos  2.2     01-18  Mg     1.50     01-18    TPro  6.0  /  Alb  3.3  /  TBili  0.6  /  DBili  x   /  AST  56<H>  /  ALT  254<H>  /  AlkPhos  117  01-18      CARDIAC MARKERS ( 18 Jan 2022 07:11 )  x     / x     / 160 U/L / x     / x              Culture - Blood (collected 16 Jan 2022 07:16)  Source: .Blood Blood-Peripheral  Preliminary Report (17 Jan 2022 08:01):    No growth to date.    Culture - Blood (collected 16 Jan 2022 07:15)  Source: .Blood Blood-Peripheral  Preliminary Report (17 Jan 2022 08:01):    No growth to date.        RADIOLOGY & ADDITIONAL TESTS:  Results Reviewed: Y  Imaging Personally Reviewed:  Electrocardiogram Personally Reviewed:    COORDINATION OF CARE:  Care Discussed with Consultants/Other Providers [Y/N]:  Prior or Outpatient Records Reviewed [Y/N]:

## 2022-01-19 NOTE — DISCHARGE NOTE NURSING/CASE MANAGEMENT/SOCIAL WORK - NSDCPECAREGIVERED_GEN_ALL_CORE
Medline and carenotes for fall prevention in elderly, hydration, Tylenol,  as well as DC Medications and side effects literature for patient reference.

## 2022-01-19 NOTE — PROGRESS NOTE ADULT - PROBLEM SELECTOR PLAN 1
Pt presenting with nonspecific sxs found to have +LE, pyuria and few bacteria in the urine along with leukocytosis with neutrophilia. Denies dysuria or changes in urinary pattern  temp of 100.6 in the ED   s/p zosyn and vanc in the ED  lactate: 1.6   UCx: neg; leukocytosis downtrending     - c/w zosyn (1/16-1/18)  - monitor fever curve, leukocytosis Pt presenting with nonspecific sxs found to have +LE, pyuria and few bacteria in the urine along with leukocytosis with neutrophilia. Denies dysuria or changes in urinary pattern  temp of 100.6 in the ED   s/p zosyn and vanc in the ED  lactate: 1.6   UCx: neg; leukocytosis downtrending     - c/w zosyn (1/16-1/19)  - monitor fever curve, leukocytosis

## 2022-01-21 LAB
CULTURE RESULTS: SIGNIFICANT CHANGE UP
CULTURE RESULTS: SIGNIFICANT CHANGE UP
SPECIMEN SOURCE: SIGNIFICANT CHANGE UP
SPECIMEN SOURCE: SIGNIFICANT CHANGE UP

## 2022-12-15 ENCOUNTER — INPATIENT (INPATIENT)
Facility: HOSPITAL | Age: 87
LOS: 6 days | Discharge: NURSING, REHAB OR LONG-TERM CARE | End: 2022-12-22
Attending: INTERNAL MEDICINE | Admitting: INTERNAL MEDICINE
Payer: MEDICARE

## 2022-12-15 VITALS
SYSTOLIC BLOOD PRESSURE: 195 MMHG | OXYGEN SATURATION: 95 % | HEART RATE: 114 BPM | TEMPERATURE: 100 F | DIASTOLIC BLOOD PRESSURE: 88 MMHG | RESPIRATION RATE: 20 BRPM

## 2022-12-15 DIAGNOSIS — Z90.89 ACQUIRED ABSENCE OF OTHER ORGANS: Chronic | ICD-10-CM

## 2022-12-15 DIAGNOSIS — Z98.890 OTHER SPECIFIED POSTPROCEDURAL STATES: Chronic | ICD-10-CM

## 2022-12-15 LAB
ALBUMIN SERPL ELPH-MCNC: 3.8 G/DL — SIGNIFICANT CHANGE UP (ref 3.3–5)
ALP SERPL-CCNC: 278 U/L — HIGH (ref 40–120)
ALT FLD-CCNC: 179 U/L — HIGH (ref 4–33)
ANION GAP SERPL CALC-SCNC: 17 MMOL/L — HIGH (ref 7–14)
APTT BLD: 22.7 SEC — LOW (ref 27–36.3)
AST SERPL-CCNC: 415 U/L — HIGH (ref 4–32)
BASE EXCESS BLDV CALC-SCNC: -4.8 MMOL/L — LOW (ref -2–3)
BASOPHILS # BLD AUTO: 0.02 K/UL — SIGNIFICANT CHANGE UP (ref 0–0.2)
BASOPHILS NFR BLD AUTO: 0.1 % — SIGNIFICANT CHANGE UP (ref 0–2)
BILIRUB SERPL-MCNC: 2.6 MG/DL — HIGH (ref 0.2–1.2)
BUN SERPL-MCNC: 29 MG/DL — HIGH (ref 7–23)
CA-I SERPL-SCNC: 1.13 MMOL/L — LOW (ref 1.15–1.33)
CALCIUM SERPL-MCNC: 9 MG/DL — SIGNIFICANT CHANGE UP (ref 8.4–10.5)
CHLORIDE BLDV-SCNC: 97 MMOL/L — SIGNIFICANT CHANGE UP (ref 96–108)
CHLORIDE SERPL-SCNC: 93 MMOL/L — LOW (ref 98–107)
CO2 BLDV-SCNC: 20.4 MMOL/L — LOW (ref 22–26)
CO2 SERPL-SCNC: 20 MMOL/L — LOW (ref 22–31)
CREAT SERPL-MCNC: 1.2 MG/DL — SIGNIFICANT CHANGE UP (ref 0.5–1.3)
EGFR: 41 ML/MIN/1.73M2 — LOW
EOSINOPHIL # BLD AUTO: 0 K/UL — SIGNIFICANT CHANGE UP (ref 0–0.5)
EOSINOPHIL NFR BLD AUTO: 0 % — SIGNIFICANT CHANGE UP (ref 0–6)
FLUAV AG NPH QL: SIGNIFICANT CHANGE UP
FLUBV AG NPH QL: SIGNIFICANT CHANGE UP
GAS PNL BLDV: 127 MMOL/L — LOW (ref 136–145)
GAS PNL BLDV: SIGNIFICANT CHANGE UP
GLUCOSE BLDV-MCNC: 201 MG/DL — HIGH (ref 70–99)
GLUCOSE SERPL-MCNC: 211 MG/DL — HIGH (ref 70–99)
HCO3 BLDV-SCNC: 19 MMOL/L — LOW (ref 22–29)
HCT VFR BLD CALC: 31.9 % — LOW (ref 34.5–45)
HCT VFR BLDA CALC: 32 % — LOW (ref 34.5–46.5)
HGB BLD CALC-MCNC: 10.5 G/DL — LOW (ref 11.5–15.5)
HGB BLD-MCNC: 10.3 G/DL — LOW (ref 11.5–15.5)
IANC: 15.47 K/UL — HIGH (ref 1.8–7.4)
IMM GRANULOCYTES NFR BLD AUTO: 0.5 % — SIGNIFICANT CHANGE UP (ref 0–0.9)
INR BLD: 0.97 RATIO — SIGNIFICANT CHANGE UP (ref 0.88–1.16)
LACTATE BLDV-MCNC: 1.6 MMOL/L — SIGNIFICANT CHANGE UP (ref 0.5–2)
LYMPHOCYTES # BLD AUTO: 0.41 K/UL — LOW (ref 1–3.3)
LYMPHOCYTES # BLD AUTO: 2.5 % — LOW (ref 13–44)
MCHC RBC-ENTMCNC: 29.6 PG — SIGNIFICANT CHANGE UP (ref 27–34)
MCHC RBC-ENTMCNC: 32.3 GM/DL — SIGNIFICANT CHANGE UP (ref 32–36)
MCV RBC AUTO: 91.7 FL — SIGNIFICANT CHANGE UP (ref 80–100)
MONOCYTES # BLD AUTO: 0.73 K/UL — SIGNIFICANT CHANGE UP (ref 0–0.9)
MONOCYTES NFR BLD AUTO: 4.4 % — SIGNIFICANT CHANGE UP (ref 2–14)
NEUTROPHILS # BLD AUTO: 15.47 K/UL — HIGH (ref 1.8–7.4)
NEUTROPHILS NFR BLD AUTO: 92.5 % — HIGH (ref 43–77)
NRBC # BLD: 0 /100 WBCS — SIGNIFICANT CHANGE UP (ref 0–0)
NRBC # FLD: 0 K/UL — SIGNIFICANT CHANGE UP (ref 0–0)
PCO2 BLDV: 32 MMHG — LOW (ref 39–42)
PH BLDV: 7.39 — SIGNIFICANT CHANGE UP (ref 7.32–7.43)
PLATELET # BLD AUTO: 298 K/UL — SIGNIFICANT CHANGE UP (ref 150–400)
PO2 BLDV: 64 MMHG — SIGNIFICANT CHANGE UP
POTASSIUM BLDV-SCNC: 3.5 MMOL/L — SIGNIFICANT CHANGE UP (ref 3.5–5.1)
POTASSIUM SERPL-MCNC: 3.8 MMOL/L — SIGNIFICANT CHANGE UP (ref 3.5–5.3)
POTASSIUM SERPL-SCNC: 3.8 MMOL/L — SIGNIFICANT CHANGE UP (ref 3.5–5.3)
PROT SERPL-MCNC: 6.2 G/DL — SIGNIFICANT CHANGE UP (ref 6–8.3)
PROTHROM AB SERPL-ACNC: 11.2 SEC — SIGNIFICANT CHANGE UP (ref 10.5–13.4)
RBC # BLD: 3.48 M/UL — LOW (ref 3.8–5.2)
RBC # FLD: 12.8 % — SIGNIFICANT CHANGE UP (ref 10.3–14.5)
RSV RNA NPH QL NAA+NON-PROBE: SIGNIFICANT CHANGE UP
SAO2 % BLDV: 91.9 % — SIGNIFICANT CHANGE UP
SARS-COV-2 RNA SPEC QL NAA+PROBE: SIGNIFICANT CHANGE UP
SODIUM SERPL-SCNC: 130 MMOL/L — LOW (ref 135–145)
TROPONIN T, HIGH SENSITIVITY RESULT: 26 NG/L — SIGNIFICANT CHANGE UP
WBC # BLD: 16.72 K/UL — HIGH (ref 3.8–10.5)
WBC # FLD AUTO: 16.72 K/UL — HIGH (ref 3.8–10.5)

## 2022-12-15 PROCEDURE — 76705 ECHO EXAM OF ABDOMEN: CPT | Mod: 26

## 2022-12-15 PROCEDURE — 99285 EMERGENCY DEPT VISIT HI MDM: CPT

## 2022-12-15 PROCEDURE — 71045 X-RAY EXAM CHEST 1 VIEW: CPT | Mod: 26

## 2022-12-15 RX ORDER — SODIUM CHLORIDE 9 MG/ML
1000 INJECTION, SOLUTION INTRAVENOUS ONCE
Refills: 0 | Status: COMPLETED | OUTPATIENT
Start: 2022-12-15 | End: 2022-12-15

## 2022-12-15 RX ADMIN — SODIUM CHLORIDE 1000 MILLILITER(S): 9 INJECTION, SOLUTION INTRAVENOUS at 21:38

## 2022-12-15 NOTE — ED ADULT TRIAGE NOTE - CHIEF COMPLAINT QUOTE
BIBEMS c/o decreased PO intake, nausea/vomiting(x1 episode), weakness x3days. pt orally febrile. HX HTN, HLD

## 2022-12-15 NOTE — ED PROVIDER NOTE - ATTENDING CONTRIBUTION TO CARE
Attending Statement: I have personally seen and examined this patient. I have fully participated in the care of this patient. I have reviewed all pertinent clinical information, including history physical exam, plan and the Resident's note and agree except as noted  89-year-old female history of hypertension and high cholesterol from home with son at bedside chief complaint of weakness.  Patient lives alone this morning she was not able to get out of bed called her son, he went over to help her, throughout the day she has been weak.  Minimal p.o. intake with some nausea vomited x1.  Febrile at home.  No chest pain or shortness of breath patient denies any fall.  Not on anticoagulation.  Patient lives alone able to do her ADLs without assistance.   vitals noted patient tachycardic, hypertensive, febrile  Elderly female laying in bed ANO x3, no sign of head trauma.  No ecchymosis bruising of the chest back abdomen.  Nontender chest wall.  No work of breathing.  Soft nontender abdomen.  Moving all extremities with no focal deficits.  Plan sepsis labs, chest x-ray, RVP, IV fluids and to be admitted

## 2022-12-15 NOTE — ED ADULT NURSE NOTE - OBJECTIVE STATEMENT
Pt arrives to ED rm 11 A&Ox3 and ambulatory at baseline c/o increased weakness that has been worsening for the past couple days. Pt son at bedside to give hx. As per son pt has gotten weak to the point that they were unable to get up from a seated position today. Pt is noted to be warm to touch, respirations are even and unlabored. Pt is sinus tach on cardiac monitor. 20G placed to R forearm, labs drawn and sent. Awaiting xray

## 2022-12-15 NOTE — ED PROVIDER NOTE - CHIEF COMPLAINT
The patient is a 89y Female complaining of weakness The patient is a 89y Female complaining of weakness, AMS

## 2022-12-15 NOTE — ED PROVIDER NOTE - NS ED ROS FT
GENERAL: no fever, no chills, no weight loss  EYES: no change in vision, no irritation, no discharge, no redness, no pain  HEENT: no trouble swallowing or speaking, no throat pain, no ear pain  CARDIAC: no chest pain, no palpitations   PULMONARY: +cough, no shortness of breath, no wheezing  GI: no abdominal pain, no nausea, no vomiting, no diarrhea, no constipation, no melena, no hematochezia, no hematemesis  : +L flank pain, no changes in urination, no dysuria, no frequency, no hematuria, no discharge  SKIN: no rashes  NEURO: no headache, no numbness, no weakness  MSK: no joint pain, no muscle pain, no back pain, no calf pain

## 2022-12-15 NOTE — ED PROVIDER NOTE - CLINICAL SUMMARY MEDICAL DECISION MAKING FREE TEXT BOX
89 yo F PMHx of HTN, HLD, OA presents with weakness, AMS, left sided flank pain similar to a prior presentation of pyelonephritis earlier this January. Patient is currently febrile and tachycardic, no focal physical exam findings. There is concern for sepsis secondary to infection, source unknown. Will check urine, CXR, RVP for etiology. Will give fluids and antipyretics to normalize heart rate. Likely admit given age.

## 2022-12-15 NOTE — ED PROVIDER NOTE - OBJECTIVE STATEMENT
89 yo F PMHx of HTN, HLD 89 yo F PMHx of HTN, HLD, OA presents with weakness, AMS for 3 days. As per her son, patient has had decreased PO intake and generalized weakness. Patient does not easily get out of bed. Endorses cough, 1 episode of vomiting. Has left sided flank pain. As per son, patient is more confused and seeing insects on the walls. No fevers at home, chills, dysuria, hematuria, frequency, abdominal pain. Patient had similar symptoms earlier this January and had a UTI. No falls reported.

## 2022-12-15 NOTE — ED PROVIDER NOTE - PROGRESS NOTE DETAILS
Patient has a transaminitis with no abdominal tenderness on exam. Will obtain right upper quadrant ultrasound to evaluate further. Patient notably had a transaminitis on prior admission in January that was thought to be due to hepatitis. Patient resting in bed no complaints she denies any abdominal pain pending UA, will add a right upper quadrant ultrasound.  Patient and son updated pending ultrasound RUQ, ua collected pending result. sign out to night team. Surgery signed off - recommend medicine admission    Rajani Keys MD, PGY2

## 2022-12-16 DIAGNOSIS — R65.10 SYSTEMIC INFLAMMATORY RESPONSE SYNDROME (SIRS) OF NON-INFECTIOUS ORIGIN WITHOUT ACUTE ORGAN DYSFUNCTION: ICD-10-CM

## 2022-12-16 DIAGNOSIS — K80.20 CALCULUS OF GALLBLADDER WITHOUT CHOLECYSTITIS WITHOUT OBSTRUCTION: ICD-10-CM

## 2022-12-16 DIAGNOSIS — I10 ESSENTIAL (PRIMARY) HYPERTENSION: ICD-10-CM

## 2022-12-16 DIAGNOSIS — N39.0 URINARY TRACT INFECTION, SITE NOT SPECIFIED: ICD-10-CM

## 2022-12-16 DIAGNOSIS — A41.9 SEPSIS, UNSPECIFIED ORGANISM: ICD-10-CM

## 2022-12-16 DIAGNOSIS — R41.82 ALTERED MENTAL STATUS, UNSPECIFIED: ICD-10-CM

## 2022-12-16 DIAGNOSIS — R74.01 ELEVATION OF LEVELS OF LIVER TRANSAMINASE LEVELS: ICD-10-CM

## 2022-12-16 DIAGNOSIS — E87.1 HYPO-OSMOLALITY AND HYPONATREMIA: ICD-10-CM

## 2022-12-16 DIAGNOSIS — M19.90 UNSPECIFIED OSTEOARTHRITIS, UNSPECIFIED SITE: ICD-10-CM

## 2022-12-16 DIAGNOSIS — Z29.9 ENCOUNTER FOR PROPHYLACTIC MEASURES, UNSPECIFIED: ICD-10-CM
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
## 2022-12-16 DIAGNOSIS — R41.0 DISORIENTATION, UNSPECIFIED: ICD-10-CM

## 2022-12-16 DIAGNOSIS — G47.00 INSOMNIA, UNSPECIFIED: ICD-10-CM

## 2022-12-16 LAB
ALBUMIN SERPL ELPH-MCNC: 3.9 G/DL — SIGNIFICANT CHANGE UP (ref 3.3–5)
ALP SERPL-CCNC: 260 U/L — HIGH (ref 40–120)
ALT FLD-CCNC: 251 U/L — HIGH (ref 4–33)
ANION GAP SERPL CALC-SCNC: 11 MMOL/L — SIGNIFICANT CHANGE UP (ref 7–14)
ANION GAP SERPL CALC-SCNC: 12 MMOL/L — SIGNIFICANT CHANGE UP (ref 7–14)
APAP SERPL-MCNC: <10 UG/ML — LOW (ref 15–25)
APPEARANCE UR: CLEAR — SIGNIFICANT CHANGE UP
AST SERPL-CCNC: 304 U/L — HIGH (ref 4–32)
BACTERIA # UR AUTO: NEGATIVE — SIGNIFICANT CHANGE UP
BASOPHILS # BLD AUTO: 0.03 K/UL — SIGNIFICANT CHANGE UP (ref 0–0.2)
BASOPHILS NFR BLD AUTO: 0.2 % — SIGNIFICANT CHANGE UP (ref 0–2)
BILIRUB DIRECT SERPL-MCNC: 3.2 MG/DL — HIGH (ref 0–0.3)
BILIRUB INDIRECT FLD-MCNC: 0.7 MG/DL — SIGNIFICANT CHANGE UP (ref 0–1)
BILIRUB SERPL-MCNC: 3.9 MG/DL — HIGH (ref 0.2–1.2)
BILIRUB UR-MCNC: NEGATIVE — SIGNIFICANT CHANGE UP
BUN SERPL-MCNC: 22 MG/DL — SIGNIFICANT CHANGE UP (ref 7–23)
BUN SERPL-MCNC: 22 MG/DL — SIGNIFICANT CHANGE UP (ref 7–23)
CALCIUM SERPL-MCNC: 8.8 MG/DL — SIGNIFICANT CHANGE UP (ref 8.4–10.5)
CALCIUM SERPL-MCNC: 9.2 MG/DL — SIGNIFICANT CHANGE UP (ref 8.4–10.5)
CHLORIDE SERPL-SCNC: 94 MMOL/L — LOW (ref 98–107)
CHLORIDE SERPL-SCNC: 95 MMOL/L — LOW (ref 98–107)
CO2 SERPL-SCNC: 25 MMOL/L — SIGNIFICANT CHANGE UP (ref 22–31)
CO2 SERPL-SCNC: 26 MMOL/L — SIGNIFICANT CHANGE UP (ref 22–31)
COLOR SPEC: YELLOW — SIGNIFICANT CHANGE UP
CREAT ?TM UR-MCNC: 20 MG/DL — SIGNIFICANT CHANGE UP
CREAT SERPL-MCNC: 1.1 MG/DL — SIGNIFICANT CHANGE UP (ref 0.5–1.3)
CREAT SERPL-MCNC: 1.12 MG/DL — SIGNIFICANT CHANGE UP (ref 0.5–1.3)
DIFF PNL FLD: ABNORMAL
E COLI DNA BLD POS QL NAA+NON-PROBE: SIGNIFICANT CHANGE UP
EGFR: 45 ML/MIN/1.73M2 — LOW
EGFR: 46 ML/MIN/1.73M2 — LOW
EOSINOPHIL # BLD AUTO: 0 K/UL — SIGNIFICANT CHANGE UP (ref 0–0.5)
EOSINOPHIL NFR BLD AUTO: 0 % — SIGNIFICANT CHANGE UP (ref 0–6)
EPI CELLS # UR: 1 /HPF — SIGNIFICANT CHANGE UP (ref 0–5)
ETHANOL SERPL-MCNC: <10 MG/DL — SIGNIFICANT CHANGE UP
GLUCOSE SERPL-MCNC: 126 MG/DL — HIGH (ref 70–99)
GLUCOSE SERPL-MCNC: 128 MG/DL — HIGH (ref 70–99)
GLUCOSE UR QL: ABNORMAL
GRAM STN FLD: SIGNIFICANT CHANGE UP
HAV IGM SER-ACNC: SIGNIFICANT CHANGE UP
HBV CORE IGM SER-ACNC: SIGNIFICANT CHANGE UP
HBV SURFACE AG SER-ACNC: SIGNIFICANT CHANGE UP
HCT VFR BLD CALC: 35.7 % — SIGNIFICANT CHANGE UP (ref 34.5–45)
HCV AB S/CO SERPL IA: 0.11 S/CO — SIGNIFICANT CHANGE UP (ref 0–0.99)
HCV AB SERPL-IMP: SIGNIFICANT CHANGE UP
HGB BLD-MCNC: 11.8 G/DL — SIGNIFICANT CHANGE UP (ref 11.5–15.5)
HYALINE CASTS # UR AUTO: 2 /LPF — SIGNIFICANT CHANGE UP (ref 0–7)
IANC: 17.21 K/UL — HIGH (ref 1.8–7.4)
IMM GRANULOCYTES NFR BLD AUTO: 0.6 % — SIGNIFICANT CHANGE UP (ref 0–0.9)
KETONES UR-MCNC: ABNORMAL
LACTATE SERPL-SCNC: 1.3 MMOL/L — SIGNIFICANT CHANGE UP (ref 0.5–2)
LEUKOCYTE ESTERASE UR-ACNC: ABNORMAL
LYMPHOCYTES # BLD AUTO: 0.71 K/UL — LOW (ref 1–3.3)
LYMPHOCYTES # BLD AUTO: 3.8 % — LOW (ref 13–44)
MAGNESIUM SERPL-MCNC: 1.6 MG/DL — SIGNIFICANT CHANGE UP (ref 1.6–2.6)
MAGNESIUM SERPL-MCNC: 1.6 MG/DL — SIGNIFICANT CHANGE UP (ref 1.6–2.6)
MCHC RBC-ENTMCNC: 29.9 PG — SIGNIFICANT CHANGE UP (ref 27–34)
MCHC RBC-ENTMCNC: 33.1 GM/DL — SIGNIFICANT CHANGE UP (ref 32–36)
MCV RBC AUTO: 90.4 FL — SIGNIFICANT CHANGE UP (ref 80–100)
METHOD TYPE: SIGNIFICANT CHANGE UP
MONOCYTES # BLD AUTO: 0.64 K/UL — SIGNIFICANT CHANGE UP (ref 0–0.9)
MONOCYTES NFR BLD AUTO: 3.4 % — SIGNIFICANT CHANGE UP (ref 2–14)
NEUTROPHILS # BLD AUTO: 17.21 K/UL — HIGH (ref 1.8–7.4)
NEUTROPHILS NFR BLD AUTO: 92 % — HIGH (ref 43–77)
NITRITE UR-MCNC: NEGATIVE — SIGNIFICANT CHANGE UP
NRBC # BLD: 0 /100 WBCS — SIGNIFICANT CHANGE UP (ref 0–0)
NRBC # FLD: 0 K/UL — SIGNIFICANT CHANGE UP (ref 0–0)
OSMOLALITY UR: 254 MOSM/KG — SIGNIFICANT CHANGE UP (ref 50–1200)
PH UR: 6.5 — SIGNIFICANT CHANGE UP (ref 5–8)
PHOSPHATE SERPL-MCNC: 2.7 MG/DL — SIGNIFICANT CHANGE UP (ref 2.5–4.5)
PHOSPHATE SERPL-MCNC: 3.2 MG/DL — SIGNIFICANT CHANGE UP (ref 2.5–4.5)
PLATELET # BLD AUTO: 313 K/UL — SIGNIFICANT CHANGE UP (ref 150–400)
POTASSIUM SERPL-MCNC: 3.9 MMOL/L — SIGNIFICANT CHANGE UP (ref 3.5–5.3)
POTASSIUM SERPL-MCNC: 4.2 MMOL/L — SIGNIFICANT CHANGE UP (ref 3.5–5.3)
POTASSIUM SERPL-SCNC: 3.9 MMOL/L — SIGNIFICANT CHANGE UP (ref 3.5–5.3)
POTASSIUM SERPL-SCNC: 4.2 MMOL/L — SIGNIFICANT CHANGE UP (ref 3.5–5.3)
POTASSIUM UR-SCNC: 20.2 MMOL/L — SIGNIFICANT CHANGE UP
PROT ?TM UR-MCNC: 66 MG/DL — SIGNIFICANT CHANGE UP
PROT SERPL-MCNC: 6.3 G/DL — SIGNIFICANT CHANGE UP (ref 6–8.3)
PROT UR-MCNC: ABNORMAL
PROT/CREAT UR-RTO: 3.2 RATIO — HIGH (ref 0–0.2)
RBC # BLD: 3.95 M/UL — SIGNIFICANT CHANGE UP (ref 3.8–5.2)
RBC # FLD: 12.5 % — SIGNIFICANT CHANGE UP (ref 10.3–14.5)
RBC CASTS # UR COMP ASSIST: 2 /HPF — SIGNIFICANT CHANGE UP (ref 0–4)
SALICYLATES SERPL-MCNC: <0.3 MG/DL — LOW (ref 15–30)
SODIUM SERPL-SCNC: 131 MMOL/L — LOW (ref 135–145)
SODIUM SERPL-SCNC: 132 MMOL/L — LOW (ref 135–145)
SODIUM UR-SCNC: 62 MMOL/L — SIGNIFICANT CHANGE UP
SP GR SPEC: 1.01 — SIGNIFICANT CHANGE UP (ref 1.01–1.05)
SPECIMEN SOURCE: SIGNIFICANT CHANGE UP
SPECIMEN SOURCE: SIGNIFICANT CHANGE UP
TOXICOLOGY SCREEN, DRUGS OF ABUSE, SERUM RESULT: SIGNIFICANT CHANGE UP
TROPONIN T, HIGH SENSITIVITY RESULT: 27 NG/L — SIGNIFICANT CHANGE UP
UROBILINOGEN FLD QL: SIGNIFICANT CHANGE UP
UUN UR-MCNC: 247.6 MG/DL — SIGNIFICANT CHANGE UP
WBC # BLD: 18.7 K/UL — HIGH (ref 3.8–10.5)
WBC # FLD AUTO: 18.7 K/UL — HIGH (ref 3.8–10.5)
WBC UR QL: 6 /HPF — HIGH (ref 0–5)

## 2022-12-16 PROCEDURE — 99223 1ST HOSP IP/OBS HIGH 75: CPT | Mod: FS

## 2022-12-16 PROCEDURE — 93010 ELECTROCARDIOGRAM REPORT: CPT

## 2022-12-16 RX ORDER — CEFTRIAXONE 500 MG/1
2000 INJECTION, POWDER, FOR SOLUTION INTRAMUSCULAR; INTRAVENOUS EVERY 24 HOURS
Refills: 0 | Status: DISCONTINUED | OUTPATIENT
Start: 2022-12-16 | End: 2022-12-22

## 2022-12-16 RX ORDER — ATENOLOL 25 MG/1
25 TABLET ORAL
Refills: 0 | Status: DISCONTINUED | OUTPATIENT
Start: 2022-12-16 | End: 2022-12-22

## 2022-12-16 RX ORDER — PIPERACILLIN AND TAZOBACTAM 4; .5 G/20ML; G/20ML
3.38 INJECTION, POWDER, LYOPHILIZED, FOR SOLUTION INTRAVENOUS ONCE
Refills: 0 | Status: COMPLETED | OUTPATIENT
Start: 2022-12-16 | End: 2022-12-16

## 2022-12-16 RX ORDER — PANTOPRAZOLE SODIUM 20 MG/1
40 TABLET, DELAYED RELEASE ORAL
Refills: 0 | Status: DISCONTINUED | OUTPATIENT
Start: 2022-12-16 | End: 2022-12-22

## 2022-12-16 RX ORDER — HEPARIN SODIUM 5000 [USP'U]/ML
5000 INJECTION INTRAVENOUS; SUBCUTANEOUS EVERY 12 HOURS
Refills: 0 | Status: DISCONTINUED | OUTPATIENT
Start: 2022-12-16 | End: 2022-12-22

## 2022-12-16 RX ORDER — ACETAMINOPHEN 500 MG
650 TABLET ORAL EVERY 6 HOURS
Refills: 0 | Status: DISCONTINUED | OUTPATIENT
Start: 2022-12-16 | End: 2022-12-18

## 2022-12-16 RX ORDER — OXYBUTYNIN CHLORIDE 5 MG
5 TABLET ORAL AT BEDTIME
Refills: 0 | Status: DISCONTINUED | OUTPATIENT
Start: 2022-12-16 | End: 2022-12-22

## 2022-12-16 RX ORDER — ESZOPICLONE 2 MG/1
1 TABLET, COATED ORAL
Qty: 0 | Refills: 0 | DISCHARGE

## 2022-12-16 RX ORDER — LIDOCAINE 4 G/100G
1 CREAM TOPICAL DAILY
Refills: 0 | Status: DISCONTINUED | OUTPATIENT
Start: 2022-12-16 | End: 2022-12-22

## 2022-12-16 RX ORDER — ATENOLOL 25 MG/1
25 TABLET ORAL DAILY
Refills: 0 | Status: DISCONTINUED | OUTPATIENT
Start: 2022-12-16 | End: 2022-12-16

## 2022-12-16 RX ORDER — ATENOLOL 25 MG/1
1 TABLET ORAL
Qty: 0 | Refills: 0 | DISCHARGE

## 2022-12-16 RX ORDER — ATENOLOL 25 MG/1
50 TABLET ORAL AT BEDTIME
Refills: 0 | Status: DISCONTINUED | OUTPATIENT
Start: 2022-12-16 | End: 2022-12-22

## 2022-12-16 RX ORDER — LOSARTAN POTASSIUM 100 MG/1
100 TABLET, FILM COATED ORAL DAILY
Refills: 0 | Status: DISCONTINUED | OUTPATIENT
Start: 2022-12-16 | End: 2022-12-22

## 2022-12-16 RX ORDER — ACETAMINOPHEN 500 MG
650 TABLET ORAL ONCE
Refills: 0 | Status: COMPLETED | OUTPATIENT
Start: 2022-12-16 | End: 2022-12-16

## 2022-12-16 RX ORDER — OXYBUTYNIN CHLORIDE 5 MG
5 TABLET ORAL AT BEDTIME
Refills: 0 | Status: DISCONTINUED | OUTPATIENT
Start: 2022-12-16 | End: 2022-12-16

## 2022-12-16 RX ORDER — ATENOLOL 25 MG/1
50 TABLET ORAL DAILY
Refills: 0 | Status: DISCONTINUED | OUTPATIENT
Start: 2022-12-16 | End: 2022-12-16

## 2022-12-16 RX ORDER — SODIUM CHLORIDE 9 MG/ML
1000 INJECTION INTRAMUSCULAR; INTRAVENOUS; SUBCUTANEOUS
Refills: 0 | Status: DISCONTINUED | OUTPATIENT
Start: 2022-12-16 | End: 2022-12-19

## 2022-12-16 RX ORDER — PIPERACILLIN AND TAZOBACTAM 4; .5 G/20ML; G/20ML
3.38 INJECTION, POWDER, LYOPHILIZED, FOR SOLUTION INTRAVENOUS EVERY 12 HOURS
Refills: 0 | Status: DISCONTINUED | OUTPATIENT
Start: 2022-12-16 | End: 2022-12-16

## 2022-12-16 RX ORDER — PIPERACILLIN AND TAZOBACTAM 4; .5 G/20ML; G/20ML
3.38 INJECTION, POWDER, LYOPHILIZED, FOR SOLUTION INTRAVENOUS EVERY 8 HOURS
Refills: 0 | Status: DISCONTINUED | OUTPATIENT
Start: 2022-12-16 | End: 2022-12-16

## 2022-12-16 RX ORDER — RABEPRAZOLE 20 MG/1
1 TABLET, DELAYED RELEASE ORAL
Qty: 0 | Refills: 0 | DISCHARGE

## 2022-12-16 RX ORDER — LANOLIN ALCOHOL/MO/W.PET/CERES
6 CREAM (GRAM) TOPICAL AT BEDTIME
Refills: 0 | Status: DISCONTINUED | OUTPATIENT
Start: 2022-12-16 | End: 2022-12-22

## 2022-12-16 RX ADMIN — HEPARIN SODIUM 5000 UNIT(S): 5000 INJECTION INTRAVENOUS; SUBCUTANEOUS at 18:16

## 2022-12-16 RX ADMIN — Medication 5 MILLIGRAM(S): at 21:21

## 2022-12-16 RX ADMIN — Medication 6 MILLIGRAM(S): at 21:21

## 2022-12-16 RX ADMIN — CEFTRIAXONE 100 MILLIGRAM(S): 500 INJECTION, POWDER, FOR SOLUTION INTRAMUSCULAR; INTRAVENOUS at 18:05

## 2022-12-16 RX ADMIN — PIPERACILLIN AND TAZOBACTAM 200 GRAM(S): 4; .5 INJECTION, POWDER, LYOPHILIZED, FOR SOLUTION INTRAVENOUS at 06:40

## 2022-12-16 RX ADMIN — ATENOLOL 50 MILLIGRAM(S): 25 TABLET ORAL at 21:20

## 2022-12-16 RX ADMIN — Medication 650 MILLIGRAM(S): at 02:57

## 2022-12-16 RX ADMIN — SODIUM CHLORIDE 60 MILLILITER(S): 9 INJECTION INTRAMUSCULAR; INTRAVENOUS; SUBCUTANEOUS at 16:11

## 2022-12-16 NOTE — PHYSICAL THERAPY INITIAL EVALUATION ADULT - GENERAL OBSERVATIONS, REHAB EVAL
Consult received, chart reviewed. Patient received in bed, no apparent distress, granddaughter present.

## 2022-12-16 NOTE — PATIENT PROFILE ADULT - LEGAL HELP
Detail Level: Detailed Quality 131: Pain Assessment And Follow-Up: Pain assessment using a standardized tool is documented as negative, no follow-up plan required Quality 111:Pneumonia Vaccination Status For Older Adults: Pneumococcal Vaccination not Administered or Previously Received, Reason not Otherwise Specified Quality 402: Tobacco Use And Help With Quitting Among Adolescents: Patient screened for tobacco and never smoked no

## 2022-12-16 NOTE — H&P ADULT - NSHPSOCIALHISTORY_GEN_ALL_CORE
Lives alone, independent in ADLS, son assists with shopping Lives alone, independent in ADLS, son assists with shopping  Covid vaccinated & boosted x3  Influenza Vaccinated

## 2022-12-16 NOTE — PATIENT PROFILE ADULT - FALL HARM RISK - HARM RISK INTERVENTIONS
Assistance with ambulation/Assistance OOB with selected safe patient handling equipment/Communicate Risk of Fall with Harm to all staff/Discuss with provider need for PT consult/Monitor gait and stability/Reinforce activity limits and safety measures with patient and family/Tailored Fall Risk Interventions/Visual Cue: Yellow wristband and red socks/Bed in lowest position, wheels locked, appropriate side rails in place/Call bell, personal items and telephone in reach/Instruct patient to call for assistance before getting out of bed or chair/Non-slip footwear when patient is out of bed/[place] to call system/Physically safe environment - no spills, clutter or unnecessary equipment/Purposeful Proactive Rounding/Room/bathroom lighting operational, light cord in reach

## 2022-12-16 NOTE — H&P ADULT - PROBLEM SELECTOR PLAN 2
-Labs notable for serum sodium of 130  -received 1L of LR in ED  -patient w/decreased PO intake last 3 days  -Urine Studies w/urine sodium >20  -Likely hypovolemic hyponatremia, pending labs for further eval -Had similar presentation Jan 2022 for which she was treated for UTi w/resolution of symptoms  -UA negative however given symptoms and history will treat for acute UTi pending urine culture results -Had similar presentation Jan 2022 for which she was treated for UTi w/resolution of symptoms  -UA negative however given symptoms and history will treat for acute UTi pending urine culture results  -concern for pyelonephritis given left CVA tenderness, c/w Zosyn and f/U Ct abdomen and pelvis

## 2022-12-16 NOTE — H&P ADULT - NS ATTEND AMEND GEN_ALL_CORE FT
HPI-    89 year old female with past medical history of HTN, HLD, OA presents with weakness, AMS for 3 days. As per her son, patient has had decreased PO intake and generalized weakness. Admitted to medicine service for treatment of UTI.     Imaging: CXR neg, abd U/S showed Gallbladder sludge/small stones and wall thickening. Negative sonographic Solano sign, although if pain medications were administered this would   limit sensitivity. Findings overall equivocal for acute cholecystitis.     Labs remarkable for leukocytosis, hyponatremia, elevated bili with elevated LFTs  UA shows small leukocyte esterase and pyuria     PHYSICAL EXAM:  GENERAL: NAD, frail appearing elderly female resting in bed  HEAD:  Atraumatic, Normocephalic  EYES: EOMI, PERRLA, conjunctiva and sclera clear  NECK: Supple,   CHEST/LUNG: Clear to auscultation bilaterally; No wheeze  HEART: Regular rate and rhythm; No murmurs, rubs, or gallops  ABDOMEN: Soft, + TTP in RUQ and LLQ, no rebound tenderness/guarding noted, Nondistended; Bowel sounds present  EXTREMITIES:  No clubbing, cyanosis, or edema  PSYCH: AAOx3- but does get confused about few details.   NEUROLOGY: CN II-XII grossly intact, moving all extremities  SKIN: No rashes or lesions    # Sepsis with acute UTI- Continue Zosyn for now and follow up urine and BCx. monitor UOP.  Continue IVF  #metabolic encephalopathy- likely 2/2 sepsis. Monitor for hospital delirium and continue abx. if worsening, consider psych consult   #hyponatremia- likely 2/2 decreased PO intake over the last one week. IVF and monitor BMP. Urine studies ordered  #Cholelithiasis without cholecystitis- RUQ U/S concerning for possible acute cholecystitis. surgery consulted and no acute interventions at this time.   #Transaminitis- monitor bili and LFTs. does have hx of taking Tylenol 4x/day but not as frequently as earlier this year. consult GI in am. CT abd/pelvis pending   #?hx of fall approx. 2 weeks ago- ordered CT head and follow up, follow up orthostatics.   #HTN- continue home meds. F/u orthostatics     DVT ppx: heparin     I have personally seen and examined patient. I discussed the case with ACP and agree with findings and plan as per note above.

## 2022-12-16 NOTE — H&P ADULT - HISTORY OF PRESENT ILLNESS
89 year old female with past medical history of HTN, HLD, OA presents with weakness, AMS for 3 days. As per her son, patient has had decreased PO intake and generalized weakness.  89 year old female with past medical history of HTN, HLD, OA presents with weakness, AMS for 3 days. As per her son, patient has had decreased PO intake and generalized weakness and decreased oral intake over the last 3 days. Of note, patient lives alone, is A&Ox4 and fully independent at baseline.  Patient initially uncooperative with questioning and collateral obtained from son Irvin who states that he went to assist his mother as she was weak and unable to get out of bed independently, she had limited oral intake the day prior to admission and there is a concern that she had not been eating or drinking prior to that as well.  Irvin reports subjective fever at home w/associated flank pain and vomiting which prompted ER visit.  Patient denies fever, chills, dysuria, weakness, malaise, shortness of breath, chest pain and abdominal pain however does endorse dizziness and admits to a recent fall however her story varies and she reports "cracking her head open". patient admitted to medicine for further evaluation

## 2022-12-16 NOTE — H&P ADULT - ASSESSMENT
89 year old female with past medical history of HTN, HLD, OA presents with weakness, AMS for 3 days. As per her son, patient has had decreased PO intake and generalized weakness. Admitted to medicine service for treatment of UTi.

## 2022-12-16 NOTE — PHYSICAL THERAPY INITIAL EVALUATION ADULT - GAIT DEVIATIONS NOTED, PT EVAL
decreased paola/increased time in double stance/decreased step length/decreased stride length/decreased weight-shifting ability

## 2022-12-16 NOTE — H&P ADULT - PROBLEM SELECTOR PLAN 5
-RUQ US performed with concern for acute cholecystitis  -Patient was seen by surgery given no abdominal pain & negative mock sign acute handy unlikely   -No surgical intervention required  -Continue to monitor for abdominal pain and oral intake intolerance, reconsult surgery if continued concerns

## 2022-12-16 NOTE — GOALS OF CARE CONVERSATION - ADVANCED CARE PLANNING - CONVERSATION DETAILS
Patient states she has a lviing will which she would not want to be resuscitated.  She goes on to explain that she once took a CPR course and knows what CPR and resuscitation is.  SHe explains that she is 89 years old and has lived a good life, she says when its over, its over and I do not want to be resuscitated.  Son Irvin and Daughter Lili at bedside who are in agreement with patients decision.    Of note, the patient is able to explain what resuscitation is and currently answers orientation questions appropriately however does have transient periods of confusion.  Thus, this choice was witnessed by her son and daughter.  MOLST form was signed by ACP and witnessed by RN.  DNR order entered in sunrise.

## 2022-12-16 NOTE — CONSULT NOTE ADULT - SUBJECTIVE AND OBJECTIVE BOX
CC: 89y old Female admitted with a chief complaint of flank pain, now    HPI: 89 yo F PMHx of HTN, HLD, OA presents with weakness, AMS for 3 days. As per her son, patient has had decreased PO intake and generalized weakness. Patient does not easily get out of bed and has left sided flank pain. As per son, patient is more confused and seeing insects on the walls. No fevers at home, chills, dysuria, hematuria, frequency, abdominal pain. Patient had similar symptoms earlier this January and had a UTI. No falls reported. Per patient, she has had decreased PO intake for the last few months, however not due to abdominal pain. No N/V after eating, no crampy pain, no history of gallstones. Patient on previous admission with elevated LFTs, imaging c/w multiple liver cysts.    PMHx: HLD (hyperlipidemia)    HTN (hypertension)    Carpal tunnel syndrome, left upper limb    Muscle cramp    Trigger finger, left ring finger    Trigger finger of left thumb    Neuropathy    Environmental allergies    Arthritis      PSHx: History of tonsillectomy    History of conization of cervix      Medications (inpatient):   Medications (PRN):  Allergies: sulfa drugs (Fever)  (Intolerances: Demerol HCl (Other)  )  Social Hx:   Family Hx: Family history of lymphoma (Child)    Family history of colon cancer in mother (Mother)        Physical Exam  T(C): 37.9  HR: 98 (98 - 114)  BP: 165/63 (165/63 - 195/88)  RR: 20 (20 - 20)  SpO2: 100% (95% - 100%)  Tmax: T(C): , Max: 37.9 (12-15-22 @ 20:23)    General: well developed, well nourished, NAD  Neuro: alert and oriented, no focal deficits, moves all extremities spontaneously  HEENT: NCAT, EOMI, anicteric, mucosa moist  Respiratory: airway patent, respirations unlabored  CVS: regular rate and rhythm  Abdomen: soft, nontender, nondistended  Extremities: no edema, sensation and movement grossly intact  Skin: warm, dry, appropriate color    Labs:                        10.3   16.72 )-----------( 298      ( 15 Dec 2022 21:33 )             31.9     PT/INR - ( 15 Dec 2022 21:33 )   PT: 11.2 sec;   INR: 0.97 ratio         PTT - ( 15 Dec 2022 21:33 )  PTT:22.7 sec  12-15    130<L>  |  93<L>  |  29<H>  ----------------------------<  211<H>  3.8   |  20<L>  |  1.20    Ca    9.0      15 Dec 2022 21:33    TPro  6.2  /  Alb  3.8  /  TBili  2.6<H>  /  DBili  x   /  AST  415<H>  /  ALT  179<H>  /  AlkPhos  278<H>  12-15    Urinalysis Basic - ( 15 Dec 2022 23:15 )    Color: Yellow / Appearance: Clear / S.013 / pH: x  Gluc: x / Ketone: Small  / Bili: Negative / Urobili: <2 mg/dL   Blood: x / Protein: 100 mg/dL / Nitrite: Negative   Leuk Esterase: Small / RBC: 2 /HPF / WBC 6 /HPF   Sq Epi: x / Non Sq Epi: 1 /HPF / Bacteria: Negative            Imaging and other studies:    < from: US Abdomen Upper Quadrant Right (12.15.22 @ 23:52) >  FINDINGS:  Liver: Hepatic cysts measuring 5.8 and 2.0 cm  Bile ducts: Normal caliber. Common bile duct measures 6 mm.  Gallbladder: Gallbladder sludge/small stones. Gallbladder wall measures 7   mm. Negative sonographic Solano's sign (unclear if pain medications were   administered).  Pancreas: Not visualized.  Right kidney: 8.0 cm. No hydronephrosis. Small superior pole cyst.  Ascites: None.  IVC: Visualized portions are within normal limits.    IMPRESSION:    Gallbladder sludge/small stones and wall thickening. Negative sonographic   Solano sign, although if pain medications were administered this would   limit sensitivity.  Findings overall equivocal for acute cholecystitis. Correlate with   clinical and laboratory data. Nuclear medicine HIDA scan may be obtained   for further evaluation as clinically indicated.    < end of copied text >

## 2022-12-16 NOTE — H&P ADULT - PSYCHIATRIC COMMENTS
Has episodes of paranoia and confusion, no visual or tactile hallucinations, no SI or HI, per son had visual hallucination of bugs on wall

## 2022-12-16 NOTE — H&P ADULT - PROBLEM SELECTOR PLAN 8
-Takes tylenol as needed at home (has been taking approximately every other day as needed)  -Continue with tylenol

## 2022-12-16 NOTE — CONSULT NOTE ADULT - ASSESSMENT
89F hx weakness, L flank pain, decreased PO intake, and weakness. US with gallstones and wall thickening, however in setting of no RUQ pain, N/V, acute cholecystitis unlikely.    -no acute surgical intervention  -rec medical workup for decreased PO intake and weakness, flank pain  -please call back with any questions    Discussed with Dr. Whitley PGY3  B team surgery  [id number]

## 2022-12-16 NOTE — H&P ADULT - PROBLEM SELECTOR PLAN 1
-Brought in by son for weakness & confusion associated with visual hallucinations (seeing bugs on walls)  -Had similar presentation in January of 2022 although appears more severe this time, was treated for Uti w/resolution of symptoms  -Questionable fall at home-patient states she cracked her head open-head atraumatic on assessment however given history of falls & AMS will perform non-con head CT -Febrile T max 100.3 in ED w/leukocytosis & tachycardia   -RVP negative  -received zosyn in ED  -Will continue Zosyn pending blood and urine cultures -Febrile T max 100.3 in ED w/leukocytosis & tachycardia   -RVP negative  -received zosyn in ED  -Will continue Zosyn pending blood and urine cultures  -Lactate WNL

## 2022-12-16 NOTE — CHART NOTE - NSCHARTNOTEFT_GEN_A_CORE
Patient Name: Naheed Levy Date: 07/13/1926  Address: [address] [place] [id number]: Female    I Stop Ref #[id number]  Rx Written	Rx Dispensed	Drug	Quantity	Days Supply	Prescriber Name  11/30/2022	11/30/2022	eszopiclone 1 mg tablet	30	30	Evanov, Kody Smith MD  10/21/2022	11/18/2022	eszopiclone 2 mg tablet	30	30	Evanov, Kody Smith MD  08/03/2022	11/01/2022	eszopiclone 1 mg tablet	30	30	Evanov, Kody Smith MD  08/22/2022	10/21/2022	eszopiclone 2 mg tablet	30	30	Evanov, Kody Smith MD  08/03/2022	10/03/2022	eszopiclone 1 mg tablet	30	30	Evanov, Kody Smith MD  08/22/2022	09/20/2022	eszopiclone 2 mg tablet	30	30	Evanov, Kody Smith MD  08/03/2022	08/30/2022	eszopiclone 1 mg tablet	30	30	Evanov, Kody Smith MD  08/22/2022	08/22/2022	eszopiclone 2 mg tablet	30	30	Evanov, Kody Smith MD  05/03/2022	08/03/2022	eszopiclone 1 mg tablet	30	30	Evanov, Kody Smith MD  07/22/2022	07/22/2022	eszopiclone 2 mg tablet	30	30	Evanov, Kody Smith MD  05/03/2022	07/05/2022	eszopiclone 1 mg tablet	30	30	Evanov, Kody Smith MD  04/28/2022	06/24/2022	eszopiclone 2 mg tablet	30	30	Evanov, Kody Smith MD  05/03/2022	05/31/2022	eszopiclone 1 mg tablet	30	30	Evanov, Kody Smith MD  04/28/2022	05/24/2022	eszopiclone 2 mg tablet	30	30	Evanov, Kody Smith MD  02/28/2022	05/03/2022	eszopiclone 1 mg tablet	30	30	Evanov, Kody Smith MD  04/28/2022	04/28/2022	eszopiclone 2 mg tablet	30	30	Evanov, Kody Smith MD  02/28/2022	04/04/2022	eszopiclone 1 mg tablet	30	30	Evanov, Kody Smith MD  01/24/2022	03/29/2022	eszopiclone 2 mg tablet	30	30	Evanov, Kody Smith MD  02/28/2022	02/28/2022	eszopiclone 1 mg tablet	30	30	Evanov, Kody Smith MD  01/24/2022	02/23/2022	eszopiclone 2 mg tablet	30	30	Evanov, Kody Smith MD  11/05/2021	02/04/2022	eszopiclone 1 mg tablet	30	30	Evanov, Kody Smith MD  01/24/2022	01/24/2022	eszopiclone 2 mg tablet	30	30	Evanov, Kody Smith MD  11/05/2021	12/22/2021	eszopiclone 1 mg tablet	30	30	Evanov, Kody Smith MD
Acute Care Surgery Update Note    Patient unlikely to have acute cholecystitis at this time given no abdominal/RUQ pain and no vomiting.    No acute surgical interventions at this time. If patient develops abdominal pain or there is further concern, please reconsult surgery.      B Team Surgery  [id number]

## 2022-12-16 NOTE — PHYSICAL THERAPY INITIAL EVALUATION ADULT - ADDITIONAL COMMENTS
Pt. was left in bed post PT Evaluation, no apparent distress, PCA present. Roge PALMA made aware of pt. status and participation in PT.

## 2022-12-16 NOTE — H&P ADULT - PROBLEM SELECTOR PLAN 6
-elevated LFTS, was present on previous admission as well  -no abdominal pain  -likely due to underlying infection, will trend and if no improvement consider LUQ abominal US (was performed Jan 2022 w/hepatic cysts noted) -elevated LFTS, was present on previous admission as well  -no abdominal pain  -likely due to underlying infection, will trend and if no improvement consider LUQ abominal US (was performed Jan 2022 w/hepatic cysts noted)  -CT abdomen and pelvis given inconsistency in physical abdominal exams  -T-bili elevated

## 2022-12-16 NOTE — H&P ADULT - NEGATIVE PSYCHIATRIC SYMPTOMS
no suicidal ideation/no depression/no anxiety/no memory loss/no paranoia/no mood swings/no agitation/no visual hallucinations/no auditory hallucinations

## 2022-12-16 NOTE — PHARMACOTHERAPY INTERVENTION NOTE - COMMENTS
Recommended de-escalating from piperacillin-tazobactam to ceftriaxone 2g IV q24h in the setting of E. coli bacteremia.    Ramakrishna Pinon PharmD  Clinical Pharmacy Specialist, Infectious Diseases  Tele-Antimicrobial Stewardship Program (Tele-[facility])  Tele-ASP Phone: [phone]

## 2022-12-16 NOTE — H&P ADULT - PROBLEM SELECTOR PLAN 7
-Questionable fall at home,  unwitnessed  -CTH pending  -Fall precautions  -Orthostatic BP-----  -PT ahmet

## 2022-12-16 NOTE — H&P ADULT - PROBLEM SELECTOR PLAN 4
-elevated LFTS, was present on previous admission as well  -no abdominal pain  -likely due to underlying infection, will tren  d and if no improvement consider LUQ abominal US (was performed Jan 2022 w/hepatic cysts noted) -Labs notable for serum sodium of 130  -received 1L of LR in ED  -patient w/decreased PO intake last 3 days  -Urine Studies w/urine sodium >20  -Likely hypovolemic hyponatremia, pending labs for further eval -Labs notable for serum sodium of 130  -received 1L of LR in ED  -patient w/decreased PO intake last 3 days  -Urine Studies w/urine sodium >20  -Likely hypovolemic hyponatremia, pending labs for further eval  -IV hydration, repeat BMP @ 1700

## 2022-12-16 NOTE — H&P ADULT - PROBLEM SELECTOR PLAN 3
-Patient paranoid that people are keeping her in the hospital  -in Ed was confusing son Irvin with spouse (who passed approximately 15 years ago)  -Patient hesitant to answer orientation questions and states "I have all my marbles"  -Of note patient may have underlying fears of losing independence or being psychiatrically hospitalized which leads to her hesitancy-her spouse had Parkinsons with resulting dementia requiring inpatient care @ [facility] -Patient paranoid, per son was seeing bugs on walls at home which were not physically present  -in Ed was confusing son Irvin with spouse (who passed approximately 15 years ago)  -Patient hesitant to answer orientation questions and states "I have all my marbles"  -Of note patient may have underlying fears of losing independence or being psychiatrically hospitalized which leads to her hesitancy-her spouse had Parkinsons with resulting dementia requiring inpatient care @ [facility]  -will rule out organic causes & treat for UTi, f/u urine culture, CTH pending, repeat BMP pending  -If no improvement will consider psychiatric evaluation -Patient paranoid, per son was seeing bugs on walls at home which were not physically present  -in Ed was confusing son Irvin with spouse (who passed approximately 15 years ago)  -Patient hesitant to answer orientation questions and states "I have all my marbles"  -Of note patient may have underlying fears of losing independence or being psychiatrically hospitalized which leads to her hesitancy-her spouse had Parkinsons with resulting dementia requiring inpatient care @ [facility]  -will rule out organic causes & treat for UTi, f/u urine culture, CTH pending, repeat BMP pending  -TSH, B12, Folate in AM  --If no improvement will consider psychiatric evaluation

## 2022-12-17 LAB
ANION GAP SERPL CALC-SCNC: 15 MMOL/L — HIGH (ref 7–14)
BUN SERPL-MCNC: 19 MG/DL — SIGNIFICANT CHANGE UP (ref 7–23)
CALCIUM SERPL-MCNC: 9.2 MG/DL — SIGNIFICANT CHANGE UP (ref 8.4–10.5)
CHLORIDE SERPL-SCNC: 93 MMOL/L — LOW (ref 98–107)
CO2 SERPL-SCNC: 24 MMOL/L — SIGNIFICANT CHANGE UP (ref 22–31)
CREAT SERPL-MCNC: 1.08 MG/DL — SIGNIFICANT CHANGE UP (ref 0.5–1.3)
CULTURE RESULTS: SIGNIFICANT CHANGE UP
EGFR: 47 ML/MIN/1.73M2 — LOW
FOLATE SERPL-MCNC: 19 NG/ML — HIGH (ref 3.1–17.5)
GLUCOSE SERPL-MCNC: 119 MG/DL — HIGH (ref 70–99)
HCT VFR BLD CALC: 35 % — SIGNIFICANT CHANGE UP (ref 34.5–45)
HGB BLD-MCNC: 11.5 G/DL — SIGNIFICANT CHANGE UP (ref 11.5–15.5)
MAGNESIUM SERPL-MCNC: 1.6 MG/DL — SIGNIFICANT CHANGE UP (ref 1.6–2.6)
MCHC RBC-ENTMCNC: 30 PG — SIGNIFICANT CHANGE UP (ref 27–34)
MCHC RBC-ENTMCNC: 32.9 GM/DL — SIGNIFICANT CHANGE UP (ref 32–36)
MCV RBC AUTO: 91.4 FL — SIGNIFICANT CHANGE UP (ref 80–100)
MRSA PCR RESULT.: SIGNIFICANT CHANGE UP
NRBC # BLD: 0 /100 WBCS — SIGNIFICANT CHANGE UP (ref 0–0)
NRBC # FLD: 0 K/UL — SIGNIFICANT CHANGE UP (ref 0–0)
PHOSPHATE SERPL-MCNC: 3.1 MG/DL — SIGNIFICANT CHANGE UP (ref 2.5–4.5)
PLATELET # BLD AUTO: 294 K/UL — SIGNIFICANT CHANGE UP (ref 150–400)
POTASSIUM SERPL-MCNC: 3.7 MMOL/L — SIGNIFICANT CHANGE UP (ref 3.5–5.3)
POTASSIUM SERPL-SCNC: 3.7 MMOL/L — SIGNIFICANT CHANGE UP (ref 3.5–5.3)
RBC # BLD: 3.83 M/UL — SIGNIFICANT CHANGE UP (ref 3.8–5.2)
RBC # FLD: 12.5 % — SIGNIFICANT CHANGE UP (ref 10.3–14.5)
S AUREUS DNA NOSE QL NAA+PROBE: SIGNIFICANT CHANGE UP
SODIUM SERPL-SCNC: 132 MMOL/L — LOW (ref 135–145)
SPECIMEN SOURCE: SIGNIFICANT CHANGE UP
TSH SERPL-MCNC: 2.66 UIU/ML — SIGNIFICANT CHANGE UP (ref 0.27–4.2)
VIT B12 SERPL-MCNC: 902 PG/ML — HIGH (ref 200–900)
WBC # BLD: 15.8 K/UL — HIGH (ref 3.8–10.5)
WBC # FLD AUTO: 15.8 K/UL — HIGH (ref 3.8–10.5)

## 2022-12-17 PROCEDURE — 99233 SBSQ HOSP IP/OBS HIGH 50: CPT

## 2022-12-17 PROCEDURE — 99223 1ST HOSP IP/OBS HIGH 75: CPT

## 2022-12-17 RX ADMIN — LIDOCAINE 1 PATCH: 4 CREAM TOPICAL at 12:04

## 2022-12-17 RX ADMIN — LOSARTAN POTASSIUM 100 MILLIGRAM(S): 100 TABLET, FILM COATED ORAL at 06:05

## 2022-12-17 RX ADMIN — LIDOCAINE 1 PATCH: 4 CREAM TOPICAL at 19:13

## 2022-12-17 RX ADMIN — HEPARIN SODIUM 5000 UNIT(S): 5000 INJECTION INTRAVENOUS; SUBCUTANEOUS at 06:05

## 2022-12-17 RX ADMIN — PANTOPRAZOLE SODIUM 40 MILLIGRAM(S): 20 TABLET, DELAYED RELEASE ORAL at 06:04

## 2022-12-17 RX ADMIN — LIDOCAINE 1 PATCH: 4 CREAM TOPICAL at 12:05

## 2022-12-17 RX ADMIN — ATENOLOL 50 MILLIGRAM(S): 25 TABLET ORAL at 21:15

## 2022-12-17 RX ADMIN — ATENOLOL 25 MILLIGRAM(S): 25 TABLET ORAL at 07:39

## 2022-12-17 RX ADMIN — Medication 6 MILLIGRAM(S): at 21:16

## 2022-12-17 RX ADMIN — HEPARIN SODIUM 5000 UNIT(S): 5000 INJECTION INTRAVENOUS; SUBCUTANEOUS at 19:04

## 2022-12-17 RX ADMIN — CEFTRIAXONE 100 MILLIGRAM(S): 500 INJECTION, POWDER, FOR SOLUTION INTRAMUSCULAR; INTRAVENOUS at 19:04

## 2022-12-17 RX ADMIN — Medication 5 MILLIGRAM(S): at 21:16

## 2022-12-17 NOTE — CONSULT NOTE ADULT - SUBJECTIVE AND OBJECTIVE BOX
HPI:  89 year old female with past medical history of HTN, HLD, OA presents with weakness, AMS for 3 days. As per her son, patient has had decreased PO intake and generalized weakness and decreased oral intake over the last 3 days. Patient had subjective fever at home w/associated flank pain and vomiting which prompted ER visit.    Patient febrile in ED. Labs notable for leukocytosis. Tbili 2.6>3.9, predominantly direct. / / .      Allergies:  Demerol HCl (Other)  sulfa drugs (Fever)    Home Medications:  Ditropan XL 5 mg/24 hours oral tablet, extended release: 1 tab(s) orally once a day at bedtime (16 Dec 2022 11:47)  eszopiclone 1 mg oral tablet: 3 tab(s) orally once a day (at bedtime) (16 Dec 2022 11:47)  irbesartan 300 mg oral tablet: 1 tab(s) orally once a day (16 Dec 2022 11:47)  NexIUM 20 mg oral delayed release capsule: 1 cap(s) orally once a day (16 Dec 2022 11:47)  Tenormin 25 mg oral tablet: 1 tab(s) orally once a day in AM (16 Dec 2022 11:47)  Tenormin 25 mg oral tablet: 2 tab(s) orally once a day at bedtime (16 Dec 2022 11:47)  Vitamin B-12 1000 mcg oral tablet: 1 tab(s) orally once a day (16 Dec 2022 11:47)  Vitamin D3: 1000 unit(s) orally once a day (16 Dec 2022 11:47)    Hospital Medications:  acetaminophen     Tablet .. 650 milliGRAM(s) Oral every 6 hours PRN  atenolol  Tablet 25 milliGRAM(s) Oral <User Schedule>  atenolol  Tablet 50 milliGRAM(s) Oral at bedtime  cefTRIAXone   IVPB 2000 milliGRAM(s) IV Intermittent every 24 hours  heparin   Injectable 5000 Unit(s) SubCutaneous every 12 hours  lidocaine   4% Patch 1 Patch Transdermal daily  lidocaine   4% Patch 1 Patch Transdermal daily  losartan 100 milliGRAM(s) Oral daily  melatonin 6 milliGRAM(s) Oral at bedtime  oxybutynin XL 5 milliGRAM(s) Oral at bedtime  pantoprazole    Tablet 40 milliGRAM(s) Oral before breakfast  sodium chloride 0.9%. 1000 milliLiter(s) IV Continuous <Continuous>      PMHX/PSHX:  HLD (hyperlipidemia)    HTN (hypertension)    Carpal tunnel syndrome, left upper limb    Muscle cramp    Trigger finger, left ring finger    Trigger finger of left thumb    Neuropathy    Environmental allergies    Arthritis    History of tonsillectomy    History of conization of cervix        Family history:  Family history of lymphoma (Child)    Family history of colon cancer in mother (Mother)        Denies family history of colon cancer/polyps, stomach cancer/polyps, pancreatic cancer/masses, liver cancer/disease, ovarian cancer and endometrial cancer.    Social History:   Tob: Denies  EtOH: Denies  Illicit Drugs: Denies    ROS:     General:  No wt loss, fevers, chills, night sweats, fatigue  Eyes:  Good vision, no reported pain  ENT:  No sore throat, pain, runny nose, dysphagia  CV:  No pain, palpitations, hypo/hypertension  Pulm:  No dyspnea, cough, tachypnea, wheezing  GI:  see HPI  :  No pain, bleeding, incontinence, nocturia  Muscle:  No pain, weakness  Neuro:  No weakness, tingling, memory problems  Psych:  No fatigue, insomnia, mood problems, depression  Endocrine:  No polyuria, polydipsia, cold/heat intolerance  Heme:  No petechiae, ecchymosis, easy bruisability  Skin:  No rash, tattoos, scars, edema    PHYSICAL EXAM:   GENERAL:  No acute distress  HEENT:  NCAT, no scleral icterus   CHEST:  no respiratory distress  HEART:  Regular rate and rhythm  ABDOMEN:  Soft, non-tender, non-distended, normoactive bowel sounds,  no masses  EXTREMITIES: No edema  SKIN:  No rash/erythema/ecchymoses/petechiae/wounds/abscess/warm/dry  NEURO:  Alert and oriented x 3, no asterixis    Vital Signs:  Vital Signs Last 24 Hrs  T(C): 36.4 (17 Dec 2022 05:59), Max: 36.9 (17 Dec 2022 01:50)  T(F): 97.5 (17 Dec 2022 05:59), Max: 98.5 (17 Dec 2022 01:50)  HR: 77 (17 Dec 2022 05:59) (77 - 100)  BP: 159/82 (17 Dec 2022 05:59) (156/64 - 189/87)  BP(mean): --  RR: 17 (17 Dec 2022 05:59) (17 - 18)  SpO2: 98% (17 Dec 2022 05:59) (94% - 100%)    Parameters below as of 17 Dec 2022 05:59  Patient On (Oxygen Delivery Method): room air      Daily     Daily Weight in k.1 (17 Dec 2022 01:50)    LABS:                        11.5   15.80 )-----------( 294      ( 17 Dec 2022 06:14 )             35.0     Mean Cell Volume: 91.4 fL (- @ 06:14)        132<L>  |  93<L>  |  19  ----------------------------<  119<H>  3.7   |  24  |  1.08    Ca    9.2      17 Dec 2022 06:14  Phos  3.1       Mg     1.60         TPro  6.3  /  Alb  3.9  /  TBili  3.9<H>  /  DBili  3.2<H>  /  AST  304<H>  /  ALT  251<H>  /  AlkPhos  260<H>  -    LIVER FUNCTIONS - ( 16 Dec 2022 10:44 )  Alb: 3.9 g/dL / Pro: 6.3 g/dL / ALK PHOS: 260 U/L / ALT: 251 U/L / AST: 304 U/L / GGT: x           PT/INR - ( 15 Dec 2022 21:33 )   PT: 11.2 sec;   INR: 0.97 ratio         PTT - ( 15 Dec 2022 21:33 )  PTT:22.7 sec  Urinalysis Basic - ( 15 Dec 2022 23:15 )    Color: Yellow / Appearance: Clear / S.013 / pH: x  Gluc: x / Ketone: Small  / Bili: Negative / Urobili: <2 mg/dL   Blood: x / Protein: 100 mg/dL / Nitrite: Negative   Leuk Esterase: Small / RBC: 2 /HPF / WBC 6 /HPF   Sq Epi: x / Non Sq Epi: 1 /HPF / Bacteria: Negative                              11.5   15.80 )-----------( 294      ( 17 Dec 2022 06:14 )             35.0                         11.8   18.70 )-----------( 313      ( 16 Dec 2022 10:44 )             35.7                         10.3   16.72 )-----------( 298      ( 15 Dec 2022 21:33 )             31.9       Imaging:  ACC: [id number] EXAM:  US ABDOMEN RT UPR QUADRANT                          PROCEDURE DATE:  12/15/2022          INTERPRETATION:  CLINICAL INFORMATION: Transaminitis, hyperbilirubinemia    COMPARISON: Abdominal ultrasound 1/15/2022    TECHNIQUE: Sonography of the right upper quadrant.    FINDINGS:  Liver: Hepatic cysts measuring 5.8 and 2.0 cm  Bile ducts: Normal caliber. Common bile duct measures 6 mm.  Gallbladder: Gallbladder sludge/small stones. Gallbladder wall measures 7   mm. Negative sonographic Solano's sign (unclear if pain medications were   administered).  Pancreas: Not visualized.  Right kidney: 8.0 cm. No hydronephrosis. Small superior pole cyst.  Ascites: None.  IVC: Visualized portions are within normal limits.    IMPRESSION:    Gallbladder sludge/small stones and wall thickening. Negative sonographic   Solano sign, although if pain medications were administered this would   limit sensitivity.  Findings overall equivocal for acute cholecystitis. Correlate with   clinical and laboratory data. Nuclear medicine HIDA scan may be obtained   for further evaluation as clinically indicated.    --- End of Report ---          VIOLET TAY MD; Resident Radiologist  This document has been electronically signed.  NEGRA WU MD; Attending Radiologist  This document has been electronically signed. Dec 16 2022 12:26AM             HPI:  89 year old female with past medical history of HTN, HLD, OA presents with weakness, AMS for 3 days. As per her son, patient has had decreased PO intake and generalized weakness and decreased oral intake over the last 3 days. Patient had subjective fever at home w/associated flank pain and vomiting which prompted ER visit.    Patient currently denies any abdominal pain, nausea, vomiting. No current fevers, chills. No prior hx of liver disease, no family hx. Not started on any new medications recently. Patient noted to have elevated transaminases on prior admissions going back to 2022.   Patient febrile in ED. Labs notable for leukocytosis. Tbili 2.6>3.9, predominantly direct. / / .      Allergies:  Demerol HCl (Other)  sulfa drugs (Fever)    Home Medications:  Ditropan XL 5 mg/24 hours oral tablet, extended release: 1 tab(s) orally once a day at bedtime (16 Dec 2022 11:47)  eszopiclone 1 mg oral tablet: 3 tab(s) orally once a day (at bedtime) (16 Dec 2022 11:47)  irbesartan 300 mg oral tablet: 1 tab(s) orally once a day (16 Dec 2022 11:47)  NexIUM 20 mg oral delayed release capsule: 1 cap(s) orally once a day (16 Dec 2022 11:47)  Tenormin 25 mg oral tablet: 1 tab(s) orally once a day in AM (16 Dec 2022 11:47)  Tenormin 25 mg oral tablet: 2 tab(s) orally once a day at bedtime (16 Dec 2022 11:47)  Vitamin B-12 1000 mcg oral tablet: 1 tab(s) orally once a day (16 Dec 2022 11:47)  Vitamin D3: 1000 unit(s) orally once a day (16 Dec 2022 11:47)    Hospital Medications:  acetaminophen     Tablet .. 650 milliGRAM(s) Oral every 6 hours PRN  atenolol  Tablet 25 milliGRAM(s) Oral <User Schedule>  atenolol  Tablet 50 milliGRAM(s) Oral at bedtime  cefTRIAXone   IVPB 2000 milliGRAM(s) IV Intermittent every 24 hours  heparin   Injectable 5000 Unit(s) SubCutaneous every 12 hours  lidocaine   4% Patch 1 Patch Transdermal daily  lidocaine   4% Patch 1 Patch Transdermal daily  losartan 100 milliGRAM(s) Oral daily  melatonin 6 milliGRAM(s) Oral at bedtime  oxybutynin XL 5 milliGRAM(s) Oral at bedtime  pantoprazole    Tablet 40 milliGRAM(s) Oral before breakfast  sodium chloride 0.9%. 1000 milliLiter(s) IV Continuous <Continuous>      PMHX/PSHX:  HLD (hyperlipidemia)    HTN (hypertension)    Carpal tunnel syndrome, left upper limb    Muscle cramp    Trigger finger, left ring finger    Trigger finger of left thumb    Neuropathy    Environmental allergies    Arthritis    History of tonsillectomy    History of conization of cervix        Family history:  Family history of lymphoma (Child)    Family history of colon cancer in mother (Mother)        Denies family history of colon cancer/polyps, stomach cancer/polyps, pancreatic cancer/masses, liver cancer/disease, ovarian cancer and endometrial cancer.    Social History:   Tob: Denies  EtOH: Denies  Illicit Drugs: Denies    ROS:     General:  No wt loss, fevers, chills, night sweats, fatigue  Eyes:  Good vision, no reported pain  ENT:  No sore throat, pain, runny nose, dysphagia  CV:  No pain, palpitations, hypo/hypertension  Pulm:  No dyspnea, cough, tachypnea, wheezing  GI:  see HPI  :  No pain, bleeding, incontinence, nocturia  Muscle:  No pain, weakness  Neuro:  No weakness, tingling, memory problems  Psych:  No fatigue, insomnia, mood problems, depression  Endocrine:  No polyuria, polydipsia, cold/heat intolerance  Heme:  No petechiae, ecchymosis, easy bruisability  Skin:  No rash, tattoos, scars, edema    PHYSICAL EXAM:   GENERAL:  No acute distress  HEENT:  NCAT, no scleral icterus   CHEST:  no respiratory distress  HEART:  Regular rate and rhythm  ABDOMEN:  Soft, non-tender, non-distended, normoactive bowel sounds,  no masses  EXTREMITIES: No edema  SKIN:  No rash/erythema/ecchymoses/petechiae/wounds/abscess/warm/dry  NEURO:  Alert and oriented x 3, no asterixis    Vital Signs:  Vital Signs Last 24 Hrs  T(C): 36.4 (17 Dec 2022 05:59), Max: 36.9 (17 Dec 2022 01:50)  T(F): 97.5 (17 Dec 2022 05:59), Max: 98.5 (17 Dec 2022 01:50)  HR: 77 (17 Dec 2022 05:59) (77 - 100)  BP: 159/82 (17 Dec 2022 05:59) (156/64 - 189/87)  BP(mean): --  RR: 17 (17 Dec 2022 05:59) (17 - 18)  SpO2: 98% (17 Dec 2022 05:59) (94% - 100%)    Parameters below as of 17 Dec 2022 05:59  Patient On (Oxygen Delivery Method): room air      Daily     Daily Weight in k.1 (17 Dec 2022 01:50)    LABS:                        11.5   15.80 )-----------( 294      ( 17 Dec 2022 06:14 )             35.0     Mean Cell Volume: 91.4 fL (- @ 06:14)        132<L>  |  93<L>  |  19  ----------------------------<  119<H>  3.7   |  24  |  1.08    Ca    9.2      17 Dec 2022 06:14  Phos  3.1     12  Mg     1.60         TPro  6.3  /  Alb  3.9  /  TBili  3.9<H>  /  DBili  3.2<H>  /  AST  304<H>  /  ALT  251<H>  /  AlkPhos  260<H>  12-16    LIVER FUNCTIONS - ( 16 Dec 2022 10:44 )  Alb: 3.9 g/dL / Pro: 6.3 g/dL / ALK PHOS: 260 U/L / ALT: 251 U/L / AST: 304 U/L / GGT: x           PT/INR - ( 15 Dec 2022 21:33 )   PT: 11.2 sec;   INR: 0.97 ratio         PTT - ( 15 Dec 2022 21:33 )  PTT:22.7 sec  Urinalysis Basic - ( 15 Dec 2022 23:15 )    Color: Yellow / Appearance: Clear / S.013 / pH: x  Gluc: x / Ketone: Small  / Bili: Negative / Urobili: <2 mg/dL   Blood: x / Protein: 100 mg/dL / Nitrite: Negative   Leuk Esterase: Small / RBC: 2 /HPF / WBC 6 /HPF   Sq Epi: x / Non Sq Epi: 1 /HPF / Bacteria: Negative                              11.5   15.80 )-----------( 294      ( 17 Dec 2022 06:14 )             35.0                         11.8   18.70 )-----------( 313      ( 16 Dec 2022 10:44 )             35.7                         10.3   16.72 )-----------( 298      ( 15 Dec 2022 21:33 )             31.9       Imaging:  ACC: [id number] EXAM:  US ABDOMEN RT UPR QUADRANT                          PROCEDURE DATE:  12/15/2022          INTERPRETATION:  CLINICAL INFORMATION: Transaminitis, hyperbilirubinemia    COMPARISON: Abdominal ultrasound 1/15/2022    TECHNIQUE: Sonography of the right upper quadrant.    FINDINGS:  Liver: Hepatic cysts measuring 5.8 and 2.0 cm  Bile ducts: Normal caliber. Common bile duct measures 6 mm.  Gallbladder: Gallbladder sludge/small stones. Gallbladder wall measures 7   mm. Negative sonographic Solano's sign (unclear if pain medications were   administered).  Pancreas: Not visualized.  Right kidney: 8.0 cm. No hydronephrosis. Small superior pole cyst.  Ascites: None.  IVC: Visualized portions are within normal limits.    IMPRESSION:    Gallbladder sludge/small stones and wall thickening. Negative sonographic   Solano sign, although if pain medications were administered this would   limit sensitivity.  Findings overall equivocal for acute cholecystitis. Correlate with   clinical and laboratory data. Nuclear medicine HIDA scan may be obtained   for further evaluation as clinically indicated.    --- End of Report ---          VIOLET TAY MD; Resident Radiologist  This document has been electronically signed.  NEGRA WU MD; Attending Radiologist  This document has been electronically signed. Dec 16 2022 12:26AM

## 2022-12-17 NOTE — PROVIDER CONTACT NOTE (OTHER) - ASSESSMENT
Pt alert and oriented with confusion at times. Denies chest pain, headache, or SOB.  Pt resting comfortably in bed.

## 2022-12-17 NOTE — CONSULT NOTE ADULT - ATTENDING COMMENTS
This is a 89-year-old female with history of hypertension, hyperlipidemia, osteoarthritis who presented with altered mental status for about 3 days.  Significant leukocytosis with abnormal liver tests as noted in fellow's note.  Overall improving.  Patient also was noted to have gram-negative benton bacteremia related to UTI.  Patient remains on IV ceftriaxone.  I suspect abnormal liver test which is in a cholestatic pattern likely related to cholestasis of sepsis.  Continue current management with IV antibiotics.  I suspect improvement of the liver test will follow-up with clearance of the bacteremia.  Consider adding ursodiol 300 mg twice daily.

## 2022-12-17 NOTE — CONSULT NOTE ADULT - ASSESSMENT
89 year old female with past medical history of HTN, HLD, OA presents with weakness, AMS for 3 days. As per her son, patient has had decreased PO intake and generalized weakness and decreased oral intake over the last 3 days. Patient had subjective fever at home w/associated flank pain and vomiting which prompted ER visit. Patient febrile in ED. Labs notable for leukocytosis. Tbili 2.6>3.9, predominantly direct. / / .      #Elevated Tbili, transaminases  - Pt noted to have elevated Tbili (2s) on prior admission, which normalized. Elevated transaminases present on prior labwork.   TTE 1/2022: EF 67%, normal LVSF, RVSF. moderate pulmHTN   # 89 year old female with past medical history of HTN, HLD, OA presents with weakness, AMS for 3 days. As per her son, patient has had decreased PO intake and generalized weakness and decreased oral intake over the last 3 days. Patient had subjective fever at home w/associated flank pain and vomiting which prompted ER visit. Patient febrile in ED. Labs notable for leukocytosis. Tbili 2.6>3.9, predominantly direct. / / .      #GN benton bacteremia, UTI. On ceftriaxone  #Elevated Tbili, transaminases: Likely due to sepsis cholestasis 2/2 GN bacteremia. Less likely DILI (no clear offending agent), or viral  - Pt noted to have elevated Tbili (2s) on prior admission, which normalized. Elevated transaminases present on prior labwork.   RUQ US w/ cholelithiasis but no evidence of biliary obstruction. CBD 6 mm  TTE 1/2022: EF 67%, normal LVSF, RVSF, moderate pulmHTN    Recommendations  - trend CMP  - treatment of sepsis per primary team    All recommendations are tentative until note is attested by attending.     Gay Bolivar, PGY5  Gastroenterology/Hepatology Fellow  Available on [employer] Teams  [phone] ([place] Short Range Pager)  [phone] (Long Range Pager)    After 5pm, please contact the on-call GI fellow. [phone]

## 2022-12-17 NOTE — PROGRESS NOTE ADULT - SUBJECTIVE AND OBJECTIVE BOX
Patient is a 89y old  Female who presents with a chief complaint of Weakness (17 Dec 2022 10:25)      SUBJECTIVE / OVERNIGHT EVENTS: No overnight event. Pt not answering ROS questions.    MEDICATIONS  (STANDING):  atenolol  Tablet 25 milliGRAM(s) Oral <User Schedule>  atenolol  Tablet 50 milliGRAM(s) Oral at bedtime  cefTRIAXone   IVPB 2000 milliGRAM(s) IV Intermittent every 24 hours  heparin   Injectable 5000 Unit(s) SubCutaneous every 12 hours  lidocaine   4% Patch 1 Patch Transdermal daily  lidocaine   4% Patch 1 Patch Transdermal daily  losartan 100 milliGRAM(s) Oral daily  melatonin 6 milliGRAM(s) Oral at bedtime  oxybutynin XL 5 milliGRAM(s) Oral at bedtime  pantoprazole    Tablet 40 milliGRAM(s) Oral before breakfast  sodium chloride 0.9%. 1000 milliLiter(s) (60 mL/Hr) IV Continuous <Continuous>    MEDICATIONS  (PRN):  acetaminophen     Tablet .. 650 milliGRAM(s) Oral every 6 hours PRN Temp greater or equal to 38C (100.4F), Mild Pain (1 - 3), Moderate Pain (4 - 6)      CAPILLARY BLOOD GLUCOSE        I&O's Summary      PHYSICAL EXAM:  Vital Signs Last 24 Hrs  T(C): 36.4 (17 Dec 2022 05:59), Max: 36.9 (17 Dec 2022 01:50)  T(F): 97.5 (17 Dec 2022 05:59), Max: 98.5 (17 Dec 2022 01:50)  HR: 77 (17 Dec 2022 05:59) (77 - 100)  BP: 159/82 (17 Dec 2022 05:59) (156/64 - 189/87)  BP(mean): --  RR: 17 (17 Dec 2022 05:59) (17 - 18)  SpO2: 98% (17 Dec 2022 05:59) (94% - 100%)    Parameters below as of 17 Dec 2022 05:59  Patient On (Oxygen Delivery Method): room air      CONSTITUTIONAL: NAD, somnolent  EYES: PERRLA; conjunctiva and sclera clear  ENMT: Moist oral mucosa, no pharyngeal injection or exudates; normal dentition  NECK: Supple, no palpable masses; no thyromegaly  RESPIRATORY: Normal respiratory effort; lungs are clear to auscultation bilaterally  CARDIOVASCULAR: Regular rate and rhythm, normal S1 and S2, no murmur/rub/gallop; No lower extremity edema; Peripheral pulses are 2+ bilaterally  ABDOMEN: Nontender to palpation, normoactive bowel sounds, no rebound/guarding; No hepatosplenomegaly  MUSCULOSKELETAL:  no clubbing or cyanosis of digits; no joint swelling or tenderness to palpation  PSYCH: somnolent  NEUROLOGY: CN 2-12 are intact and symmetric; no gross sensory deficits   SKIN: No rashes; no palpable lesions    LABS:                        11.5   15.80 )-----------( 294      ( 17 Dec 2022 06:14 )             35.0     12-17    132<L>  |  93<L>  |  19  ----------------------------<  119<H>  3.7   |  24  |  1.08    Ca    9.2      17 Dec 2022 06:14  Phos  3.1     12-17  Mg     1.60     -    TPro  6.3  /  Alb  3.9  /  TBili  3.9<H>  /  DBili  3.2<H>  /  AST  304<H>  /  ALT  251<H>  /  AlkPhos  260<H>  12-16    PT/INR - ( 15 Dec 2022 21:33 )   PT: 11.2 sec;   INR: 0.97 ratio         PTT - ( 15 Dec 2022 21:33 )  PTT:22.7 sec      Urinalysis Basic - ( 15 Dec 2022 23:15 )    Color: Yellow / Appearance: Clear / S.013 / pH: x  Gluc: x / Ketone: Small  / Bili: Negative / Urobili: <2 mg/dL   Blood: x / Protein: 100 mg/dL / Nitrite: Negative   Leuk Esterase: Small / RBC: 2 /HPF / WBC 6 /HPF   Sq Epi: x / Non Sq Epi: 1 /HPF / Bacteria: Negative        Culture - Blood (collected 15 Dec 2022 21:16)  Source: .Blood Blood-Peripheral  Gram Stain (16 Dec 2022 16:55):    Growth in aerobic bottle: Gram Negative Rods    Growth in anaerobic bottle: Gram Negative Rods  Preliminary Report (16 Dec 2022 16:55):    Growth in aerobic bottle: Gram Negative Rods    Growth in anaerobic bottle: Gram Negative Rods    Culture - Blood (collected 15 Dec 2022 21:16)  Source: .Blood Blood-Peripheral  Gram Stain (16 Dec 2022 15:03):    Growth in anaerobic bottle: Gram Negative Rods    Growth in aerobic bottle: Gram Negative Rods  Preliminary Report (17 Dec 2022 09:57):    Growth in aerobic and anaerobic bottles: Escherichia coli    ***Blood Panel PCR results on this specimen are available    approximately 3 hours after the Gram stain result.***    Gram stain, PCR, and/or culture results may not always    correspond due to difference in methodologies.    ************************************************************    This PCR assay was performed by multiplex PCR. This    Assay tests for 66 bacterial and resistance gene targets.    Please refer to the [facility] test directory    at [email or web address] for details.  Organism: Blood Culture PCR (16 Dec 2022 15:48)  Organism: Blood Culture PCR (16 Dec 2022 15:48)        RADIOLOGY & ADDITIONAL TESTS:  Results Reviewed:   Imaging Personally Reviewed:  Electrocardiogram Personally Reviewed:    COORDINATION OF CARE:  Care Discussed with Consultants/Other Providers [Y/N]:  Prior or Outpatient Records Reviewed [Y/N]:

## 2022-12-18 LAB
-  AMIKACIN: SIGNIFICANT CHANGE UP
-  AMPICILLIN/SULBACTAM: SIGNIFICANT CHANGE UP
-  AMPICILLIN: SIGNIFICANT CHANGE UP
-  AZTREONAM: SIGNIFICANT CHANGE UP
-  CEFAZOLIN: SIGNIFICANT CHANGE UP
-  CEFEPIME: SIGNIFICANT CHANGE UP
-  CEFOXITIN: SIGNIFICANT CHANGE UP
-  CEFTRIAXONE: SIGNIFICANT CHANGE UP
-  CIPROFLOXACIN: SIGNIFICANT CHANGE UP
-  ERTAPENEM: SIGNIFICANT CHANGE UP
-  GENTAMICIN: SIGNIFICANT CHANGE UP
-  IMIPENEM: SIGNIFICANT CHANGE UP
-  LEVOFLOXACIN: SIGNIFICANT CHANGE UP
-  MEROPENEM: SIGNIFICANT CHANGE UP
-  PIPERACILLIN/TAZOBACTAM: SIGNIFICANT CHANGE UP
-  TOBRAMYCIN: SIGNIFICANT CHANGE UP
-  TRIMETHOPRIM/SULFAMETHOXAZOLE: SIGNIFICANT CHANGE UP
ALBUMIN SERPL ELPH-MCNC: 3.8 G/DL — SIGNIFICANT CHANGE UP (ref 3.3–5)
ALP SERPL-CCNC: 186 U/L — HIGH (ref 40–120)
ALT FLD-CCNC: 103 U/L — HIGH (ref 4–33)
ANION GAP SERPL CALC-SCNC: 12 MMOL/L — SIGNIFICANT CHANGE UP (ref 7–14)
AST SERPL-CCNC: 46 U/L — HIGH (ref 4–32)
BILIRUB DIRECT SERPL-MCNC: 0.2 MG/DL — SIGNIFICANT CHANGE UP (ref 0–0.3)
BILIRUB INDIRECT FLD-MCNC: 0.3 MG/DL — SIGNIFICANT CHANGE UP (ref 0–1)
BILIRUB SERPL-MCNC: 0.5 MG/DL — SIGNIFICANT CHANGE UP (ref 0.2–1.2)
BUN SERPL-MCNC: 18 MG/DL — SIGNIFICANT CHANGE UP (ref 7–23)
CALCIUM SERPL-MCNC: 9 MG/DL — SIGNIFICANT CHANGE UP (ref 8.4–10.5)
CHLORIDE SERPL-SCNC: 100 MMOL/L — SIGNIFICANT CHANGE UP (ref 98–107)
CO2 SERPL-SCNC: 23 MMOL/L — SIGNIFICANT CHANGE UP (ref 22–31)
CREAT SERPL-MCNC: 1.05 MG/DL — SIGNIFICANT CHANGE UP (ref 0.5–1.3)
CULTURE RESULTS: SIGNIFICANT CHANGE UP
EGFR: 49 ML/MIN/1.73M2 — LOW
GLUCOSE SERPL-MCNC: 136 MG/DL — HIGH (ref 70–99)
HCT VFR BLD CALC: 33.9 % — LOW (ref 34.5–45)
HGB BLD-MCNC: 11.1 G/DL — LOW (ref 11.5–15.5)
MAGNESIUM SERPL-MCNC: 1.7 MG/DL — SIGNIFICANT CHANGE UP (ref 1.6–2.6)
MCHC RBC-ENTMCNC: 29.8 PG — SIGNIFICANT CHANGE UP (ref 27–34)
MCHC RBC-ENTMCNC: 32.7 GM/DL — SIGNIFICANT CHANGE UP (ref 32–36)
MCV RBC AUTO: 91.1 FL — SIGNIFICANT CHANGE UP (ref 80–100)
METHOD TYPE: SIGNIFICANT CHANGE UP
NRBC # BLD: 0 /100 WBCS — SIGNIFICANT CHANGE UP (ref 0–0)
NRBC # FLD: 0 K/UL — SIGNIFICANT CHANGE UP (ref 0–0)
ORGANISM # SPEC MICROSCOPIC CNT: SIGNIFICANT CHANGE UP
PHOSPHATE SERPL-MCNC: 2.9 MG/DL — SIGNIFICANT CHANGE UP (ref 2.5–4.5)
PLATELET # BLD AUTO: 377 K/UL — SIGNIFICANT CHANGE UP (ref 150–400)
POTASSIUM SERPL-MCNC: 4 MMOL/L — SIGNIFICANT CHANGE UP (ref 3.5–5.3)
POTASSIUM SERPL-SCNC: 4 MMOL/L — SIGNIFICANT CHANGE UP (ref 3.5–5.3)
PROT SERPL-MCNC: 6.9 G/DL — SIGNIFICANT CHANGE UP (ref 6–8.3)
RBC # BLD: 3.72 M/UL — LOW (ref 3.8–5.2)
RBC # FLD: 13 % — SIGNIFICANT CHANGE UP (ref 10.3–14.5)
SODIUM SERPL-SCNC: 135 MMOL/L — SIGNIFICANT CHANGE UP (ref 135–145)
SPECIMEN SOURCE: SIGNIFICANT CHANGE UP
WBC # BLD: 10.72 K/UL — HIGH (ref 3.8–10.5)
WBC # FLD AUTO: 10.72 K/UL — HIGH (ref 3.8–10.5)

## 2022-12-18 PROCEDURE — 99233 SBSQ HOSP IP/OBS HIGH 50: CPT

## 2022-12-18 PROCEDURE — 74177 CT ABD & PELVIS W/CONTRAST: CPT | Mod: 26

## 2022-12-18 PROCEDURE — 70450 CT HEAD/BRAIN W/O DYE: CPT | Mod: 26

## 2022-12-18 RX ORDER — URSODIOL 250 MG/1
300 TABLET, FILM COATED ORAL EVERY 12 HOURS
Refills: 0 | Status: DISCONTINUED | OUTPATIENT
Start: 2022-12-18 | End: 2022-12-22

## 2022-12-18 RX ORDER — HYDRALAZINE HCL 50 MG
5 TABLET ORAL ONCE
Refills: 0 | Status: COMPLETED | OUTPATIENT
Start: 2022-12-18 | End: 2022-12-18

## 2022-12-18 RX ADMIN — LIDOCAINE 1 PATCH: 4 CREAM TOPICAL at 12:51

## 2022-12-18 RX ADMIN — ATENOLOL 25 MILLIGRAM(S): 25 TABLET ORAL at 07:13

## 2022-12-18 RX ADMIN — Medication 650 MILLIGRAM(S): at 08:12

## 2022-12-18 RX ADMIN — LIDOCAINE 1 PATCH: 4 CREAM TOPICAL at 00:01

## 2022-12-18 RX ADMIN — Medication 5 MILLIGRAM(S): at 22:00

## 2022-12-18 RX ADMIN — URSODIOL 300 MILLIGRAM(S): 250 TABLET, FILM COATED ORAL at 18:27

## 2022-12-18 RX ADMIN — Medication 5 MILLIGRAM(S): at 00:17

## 2022-12-18 RX ADMIN — HEPARIN SODIUM 5000 UNIT(S): 5000 INJECTION INTRAVENOUS; SUBCUTANEOUS at 06:11

## 2022-12-18 RX ADMIN — CEFTRIAXONE 100 MILLIGRAM(S): 500 INJECTION, POWDER, FOR SOLUTION INTRAMUSCULAR; INTRAVENOUS at 18:27

## 2022-12-18 RX ADMIN — HEPARIN SODIUM 5000 UNIT(S): 5000 INJECTION INTRAVENOUS; SUBCUTANEOUS at 18:27

## 2022-12-18 RX ADMIN — LIDOCAINE 1 PATCH: 4 CREAM TOPICAL at 19:00

## 2022-12-18 RX ADMIN — LOSARTAN POTASSIUM 100 MILLIGRAM(S): 100 TABLET, FILM COATED ORAL at 06:07

## 2022-12-18 RX ADMIN — LIDOCAINE 1 PATCH: 4 CREAM TOPICAL at 12:50

## 2022-12-18 RX ADMIN — Medication 6 MILLIGRAM(S): at 21:58

## 2022-12-18 RX ADMIN — PANTOPRAZOLE SODIUM 40 MILLIGRAM(S): 20 TABLET, DELAYED RELEASE ORAL at 06:08

## 2022-12-18 RX ADMIN — ATENOLOL 50 MILLIGRAM(S): 25 TABLET ORAL at 21:59

## 2022-12-18 NOTE — DIETITIAN INITIAL EVALUATION ADULT - ORAL NUTRITION SUPPLEMENTS
Suggest Ensure Plus High Protein Therapeutic Shake 1x daily (350 kcal, 20g protein).  Ensure Plus High Protein Therapeutic Shake 2x daily (700kcal, 40g protein).

## 2022-12-18 NOTE — PROGRESS NOTE ADULT - SUBJECTIVE AND OBJECTIVE BOX
Patient is a 89y old  Female who presents with a chief complaint of Sepsis     (18 Dec 2022 09:35)      SUBJECTIVE / OVERNIGHT EVENTS: No overnight event. Pt calm this morning, not answering ROS questions.    MEDICATIONS  (STANDING):  atenolol  Tablet 25 milliGRAM(s) Oral <User Schedule>  atenolol  Tablet 50 milliGRAM(s) Oral at bedtime  cefTRIAXone   IVPB 2000 milliGRAM(s) IV Intermittent every 24 hours  heparin   Injectable 5000 Unit(s) SubCutaneous every 12 hours  lidocaine   4% Patch 1 Patch Transdermal daily  lidocaine   4% Patch 1 Patch Transdermal daily  losartan 100 milliGRAM(s) Oral daily  melatonin 6 milliGRAM(s) Oral at bedtime  oxybutynin XL 5 milliGRAM(s) Oral at bedtime  pantoprazole    Tablet 40 milliGRAM(s) Oral before breakfast  sodium chloride 0.9%. 1000 milliLiter(s) (60 mL/Hr) IV Continuous <Continuous>    MEDICATIONS  (PRN):  acetaminophen     Tablet .. 650 milliGRAM(s) Oral every 6 hours PRN Temp greater or equal to 38C (100.4F), Mild Pain (1 - 3), Moderate Pain (4 - 6)      CAPILLARY BLOOD GLUCOSE        I&O's Summary      PHYSICAL EXAM:  Vital Signs Last 24 Hrs  T(C): 36.8 (18 Dec 2022 09:16), Max: 36.8 (17 Dec 2022 17:40)  T(F): 98.2 (18 Dec 2022 09:16), Max: 98.3 (17 Dec 2022 17:40)  HR: 73 (18 Dec 2022 09:16) (73 - 80)  BP: 180/64 (18 Dec 2022 09:16) (140/58 - 210/105)  BP(mean): --  RR: 18 (18 Dec 2022 09:16) (17 - 18)  SpO2: 96% (18 Dec 2022 09:16) (96% - 98%)    Parameters below as of 18 Dec 2022 09:16  Patient On (Oxygen Delivery Method): room air      CONSTITUTIONAL: NAD, resting comfortably, not engaging with examiner  EYES: PERRLA; conjunctiva and sclera clear  ENMT: Moist oral mucosa, no pharyngeal injection or exudates; normal dentition  NECK: Supple, no palpable masses; no thyromegaly  RESPIRATORY: Normal respiratory effort; lungs are clear to auscultation bilaterally  CARDIOVASCULAR: Regular rate and rhythm, normal S1 and S2, no murmur/rub/gallop; No lower extremity edema; Peripheral pulses are 2+ bilaterally  ABDOMEN: Nontender to palpation, normoactive bowel sounds, no rebound/guarding; No hepatosplenomegaly  MUSCULOSKELETAL:  no clubbing or cyanosis of digits; no joint swelling or tenderness to palpation  PSYCH: calm  NEUROLOGY: CN 2-12 are intact and symmetric; no gross sensory deficits   SKIN: No rashes; no palpable lesions    LABS:                        11.1   10.72 )-----------( 377      ( 18 Dec 2022 05:20 )             33.9     12-18    135  |  100  |  18  ----------------------------<  136<H>  4.0   |  23  |  1.05    Ca    9.0      18 Dec 2022 05:20  Phos  2.9     12-18  Mg     1.70     12-18    TPro  6.9  /  Alb  3.8  /  TBili  0.5  /  DBili  0.2  /  AST  46<H>  /  ALT  103<H>  /  AlkPhos  186<H>  12-18              Culture - Blood (collected 17 Dec 2022 06:14)  Source: .Blood Blood-Peripheral  Preliminary Report (18 Dec 2022 09:01):    No growth to date.    Culture - Urine (collected 15 Dec 2022 23:15)  Source: Clean Catch Clean Catch (Midstream)  Final Report (17 Dec 2022 15:59):    >=3 organisms. Probable collection contamination.    Culture - Blood (collected 15 Dec 2022 21:16)  Source: .Blood Blood-Peripheral  Gram Stain (16 Dec 2022 16:55):    Growth in aerobic bottle: Gram Negative Rods    Growth in anaerobic bottle: Gram Negative Rods  Preliminary Report (17 Dec 2022 12:18):    Growth in aerobic and anaerobic bottles: Escherichia coli    See previous culture [id number]    Culture - Blood (collected 15 Dec 2022 21:16)  Source: .Blood Blood-Peripheral  Gram Stain (16 Dec 2022 15:03):    Growth in anaerobic bottle: Gram Negative Rods    Growth in aerobic bottle: Gram Negative Rods  Final Report (18 Dec 2022 08:09):    Growth in aerobic and anaerobic bottles: Escherichia coli    ***Blood Panel PCR results on this specimen are available    approximately 3 hours after the Gram stain result.***    Gram stain, PCR, and/or culture results may not always    correspond due to difference in methodologies.    ************************************************************    This PCR assay was performed by multiplex PCR. This    Assay tests for 66 bacterial and resistance gene targets.    Please refer to the [facility] test directory    at [email or web address] for details.  Organism: Blood Culture PCR  Escherichia coli (18 Dec 2022 08:09)  Organism: Escherichia coli (18 Dec 2022 08:09)  Organism: Blood Culture PCR (18 Dec 2022 08:09)        RADIOLOGY & ADDITIONAL TESTS:  Results Reviewed:   Imaging Personally Reviewed:  Electrocardiogram Personally Reviewed:    COORDINATION OF CARE:  Care Discussed with Consultants/Other Providers [Y/N]:  Prior or Outpatient Records Reviewed [Y/N]:

## 2022-12-18 NOTE — DIETITIAN INITIAL EVALUATION ADULT - OTHER INFO
RD visited with patient for requested nutrition consult - decline in oral intake > 3 days PTA.    89 year old female with past medical history of HTN, HLD, OA presents with weakness, AMS for 3 days. As per her son, patient has had decreased PO intake and generalized weakness. Admitted to medicine service for treatment of UTI.    Patient currently reports an improved appetite, stated she is feeling better now. Stated her appetite was poor PTA for at least 2 months PTA and endorsed a 20 pound weight loss since January 2022.  Patient amenable to adding Ensure with meals.  Current admission weight 95 pounds (43kgs). Patient reported weight of 100 pounds prior to admission; unable to confirm weight history with HIE section of chart at this time.  No chewing or swallowing difficulties reported.     RD visited with patient for requested nutrition consult - decline in oral intake > 3 days PTA.    89 year old female with past medical history of HTN, HLD, OA presents with weakness, AMS for 3 days. As per her son, patient has had decreased PO intake and generalized weakness. Admitted to medicine service for treatment of UTI.    Patient currently reports an improved appetite, stated she is feeling better now. Stated her appetite was poor for at least 2 months PTA and endorsed a 20 pound weight loss since January 2022.  Patient amenable to adding Ensure with meals, will recommend for 2x day (lunch and dinner) since patient reported best meal is breakfast.  Current admission weight 95 pounds (43kgs). Patient reported weight of 100 pounds prior to admission; unable to confirm weight history with HIE section of chart at this time.  No chewing or swallowing difficulties reported.  No GI distress reported i.e. nausea, vomiting, diarrhea.

## 2022-12-18 NOTE — DIETITIAN INITIAL EVALUATION ADULT - PERTINENT LABORATORY DATA
12-18    135  |  100  |  18  ----------------------------<  136<H>  4.0   |  23  |  1.05    Ca    9.0      18 Dec 2022 05:20  Phos  2.9     12-18  Mg     1.70     12-18    TPro  6.9  /  Alb  3.8  /  TBili  0.5  /  DBili  0.2  /  AST  46<H>  /  ALT  103<H>  /  AlkPhos  186<H>  12-18

## 2022-12-18 NOTE — DIETITIAN INITIAL EVALUATION ADULT - SIGNS/SYMPTOMS
Mild findings of muscle & fat loss per physical findings, caloric intake <75% nutrition needs >1 mo.

## 2022-12-18 NOTE — DIETITIAN INITIAL EVALUATION ADULT - PERTINENT MEDS FT
MEDICATIONS  (STANDING):  atenolol  Tablet 25 milliGRAM(s) Oral <User Schedule>  atenolol  Tablet 50 milliGRAM(s) Oral at bedtime  cefTRIAXone   IVPB 2000 milliGRAM(s) IV Intermittent every 24 hours  heparin   Injectable 5000 Unit(s) SubCutaneous every 12 hours  lidocaine   4% Patch 1 Patch Transdermal daily  lidocaine   4% Patch 1 Patch Transdermal daily  losartan 100 milliGRAM(s) Oral daily  melatonin 6 milliGRAM(s) Oral at bedtime  oxybutynin XL 5 milliGRAM(s) Oral at bedtime  pantoprazole    Tablet 40 milliGRAM(s) Oral before breakfast  sodium chloride 0.9%. 1000 milliLiter(s) (60 mL/Hr) IV Continuous <Continuous>    MEDICATIONS  (PRN):  acetaminophen     Tablet .. 650 milliGRAM(s) Oral every 6 hours PRN Temp greater or equal to 38C (100.4F), Mild Pain (1 - 3), Moderate Pain (4 - 6)

## 2022-12-19 DIAGNOSIS — A41.51 SEPSIS DUE TO ESCHERICHIA COLI [E. COLI]: ICD-10-CM

## 2022-12-19 LAB
ALBUMIN SERPL ELPH-MCNC: 3.6 G/DL — SIGNIFICANT CHANGE UP (ref 3.3–5)
ALP SERPL-CCNC: 160 U/L — HIGH (ref 40–120)
ALT FLD-CCNC: 71 U/L — HIGH (ref 4–33)
ANION GAP SERPL CALC-SCNC: 12 MMOL/L — SIGNIFICANT CHANGE UP (ref 7–14)
AST SERPL-CCNC: 28 U/L — SIGNIFICANT CHANGE UP (ref 4–32)
BILIRUB SERPL-MCNC: 0.4 MG/DL — SIGNIFICANT CHANGE UP (ref 0.2–1.2)
BUN SERPL-MCNC: 17 MG/DL — SIGNIFICANT CHANGE UP (ref 7–23)
CALCIUM SERPL-MCNC: 9.1 MG/DL — SIGNIFICANT CHANGE UP (ref 8.4–10.5)
CHLORIDE SERPL-SCNC: 101 MMOL/L — SIGNIFICANT CHANGE UP (ref 98–107)
CO2 SERPL-SCNC: 24 MMOL/L — SIGNIFICANT CHANGE UP (ref 22–31)
CREAT SERPL-MCNC: 1.24 MG/DL — SIGNIFICANT CHANGE UP (ref 0.5–1.3)
EGFR: 40 ML/MIN/1.73M2 — LOW
GLUCOSE SERPL-MCNC: 126 MG/DL — HIGH (ref 70–99)
HCT VFR BLD CALC: 33.8 % — LOW (ref 34.5–45)
HGB BLD-MCNC: 10.9 G/DL — LOW (ref 11.5–15.5)
MAGNESIUM SERPL-MCNC: 1.7 MG/DL — SIGNIFICANT CHANGE UP (ref 1.6–2.6)
MCHC RBC-ENTMCNC: 29.7 PG — SIGNIFICANT CHANGE UP (ref 27–34)
MCHC RBC-ENTMCNC: 32.2 GM/DL — SIGNIFICANT CHANGE UP (ref 32–36)
MCV RBC AUTO: 92.1 FL — SIGNIFICANT CHANGE UP (ref 80–100)
NRBC # BLD: 0 /100 WBCS — SIGNIFICANT CHANGE UP (ref 0–0)
NRBC # FLD: 0 K/UL — SIGNIFICANT CHANGE UP (ref 0–0)
PHOSPHATE SERPL-MCNC: 3.3 MG/DL — SIGNIFICANT CHANGE UP (ref 2.5–4.5)
PLATELET # BLD AUTO: 376 K/UL — SIGNIFICANT CHANGE UP (ref 150–400)
POTASSIUM SERPL-MCNC: 4 MMOL/L — SIGNIFICANT CHANGE UP (ref 3.5–5.3)
POTASSIUM SERPL-SCNC: 4 MMOL/L — SIGNIFICANT CHANGE UP (ref 3.5–5.3)
PROT SERPL-MCNC: 6.6 G/DL — SIGNIFICANT CHANGE UP (ref 6–8.3)
RBC # BLD: 3.67 M/UL — LOW (ref 3.8–5.2)
RBC # FLD: 12.8 % — SIGNIFICANT CHANGE UP (ref 10.3–14.5)
SARS-COV-2 RNA SPEC QL NAA+PROBE: SIGNIFICANT CHANGE UP
SODIUM SERPL-SCNC: 137 MMOL/L — SIGNIFICANT CHANGE UP (ref 135–145)
WBC # BLD: 9.17 K/UL — SIGNIFICANT CHANGE UP (ref 3.8–10.5)
WBC # FLD AUTO: 9.17 K/UL — SIGNIFICANT CHANGE UP (ref 3.8–10.5)

## 2022-12-19 PROCEDURE — 99232 SBSQ HOSP IP/OBS MODERATE 35: CPT

## 2022-12-19 RX ORDER — SENNA PLUS 8.6 MG/1
2 TABLET ORAL AT BEDTIME
Refills: 0 | Status: DISCONTINUED | OUTPATIENT
Start: 2022-12-19 | End: 2022-12-22

## 2022-12-19 RX ORDER — AMLODIPINE BESYLATE 2.5 MG/1
5 TABLET ORAL DAILY
Refills: 0 | Status: DISCONTINUED | OUTPATIENT
Start: 2022-12-19 | End: 2022-12-21

## 2022-12-19 RX ORDER — ACETAMINOPHEN 500 MG
650 TABLET ORAL ONCE
Refills: 0 | Status: COMPLETED | OUTPATIENT
Start: 2022-12-19 | End: 2022-12-19

## 2022-12-19 RX ORDER — POLYETHYLENE GLYCOL 3350 17 G/17G
17 POWDER, FOR SOLUTION ORAL DAILY
Refills: 0 | Status: DISCONTINUED | OUTPATIENT
Start: 2022-12-19 | End: 2022-12-22

## 2022-12-19 RX ADMIN — Medication 650 MILLIGRAM(S): at 07:41

## 2022-12-19 RX ADMIN — AMLODIPINE BESYLATE 5 MILLIGRAM(S): 2.5 TABLET ORAL at 13:33

## 2022-12-19 RX ADMIN — ATENOLOL 50 MILLIGRAM(S): 25 TABLET ORAL at 21:14

## 2022-12-19 RX ADMIN — HEPARIN SODIUM 5000 UNIT(S): 5000 INJECTION INTRAVENOUS; SUBCUTANEOUS at 06:54

## 2022-12-19 RX ADMIN — CEFTRIAXONE 100 MILLIGRAM(S): 500 INJECTION, POWDER, FOR SOLUTION INTRAMUSCULAR; INTRAVENOUS at 17:19

## 2022-12-19 RX ADMIN — LOSARTAN POTASSIUM 100 MILLIGRAM(S): 100 TABLET, FILM COATED ORAL at 06:54

## 2022-12-19 RX ADMIN — LIDOCAINE 1 PATCH: 4 CREAM TOPICAL at 00:14

## 2022-12-19 RX ADMIN — LIDOCAINE 1 PATCH: 4 CREAM TOPICAL at 12:11

## 2022-12-19 RX ADMIN — HEPARIN SODIUM 5000 UNIT(S): 5000 INJECTION INTRAVENOUS; SUBCUTANEOUS at 17:17

## 2022-12-19 RX ADMIN — URSODIOL 300 MILLIGRAM(S): 250 TABLET, FILM COATED ORAL at 17:17

## 2022-12-19 RX ADMIN — LIDOCAINE 1 PATCH: 4 CREAM TOPICAL at 19:58

## 2022-12-19 RX ADMIN — Medication 6 MILLIGRAM(S): at 21:12

## 2022-12-19 RX ADMIN — ATENOLOL 25 MILLIGRAM(S): 25 TABLET ORAL at 07:26

## 2022-12-19 RX ADMIN — LIDOCAINE 1 PATCH: 4 CREAM TOPICAL at 12:12

## 2022-12-19 RX ADMIN — PANTOPRAZOLE SODIUM 40 MILLIGRAM(S): 20 TABLET, DELAYED RELEASE ORAL at 06:54

## 2022-12-19 RX ADMIN — SENNA PLUS 2 TABLET(S): 8.6 TABLET ORAL at 21:16

## 2022-12-19 RX ADMIN — URSODIOL 300 MILLIGRAM(S): 250 TABLET, FILM COATED ORAL at 06:56

## 2022-12-19 RX ADMIN — POLYETHYLENE GLYCOL 3350 17 GRAM(S): 17 POWDER, FOR SOLUTION ORAL at 11:50

## 2022-12-19 RX ADMIN — Medication 5 MILLIGRAM(S): at 21:12

## 2022-12-19 NOTE — PROVIDER CONTACT NOTE (OTHER) - BACKGROUND
Pt Admitted for AMS and UTI.  PMH arthritits, HLD, HTN, Neuropathy
Pt Admitted for AMS and UTI.    PMH arthritits, HLD, HTN, Neuropathy

## 2022-12-19 NOTE — PROGRESS NOTE ADULT - SUBJECTIVE AND OBJECTIVE BOX
Patient is a 89y old  Female who presents with a chief complaint of Weakness (18 Dec 2022 11:37)    Obed Potts MD   [facility]   Pager [phone]  Reachable on [employer] Teams     SUBJECTIVE / OVERNIGHT EVENTS:  Patient seen and examined this morning. She thinks that she was admitted for management of her blood pressure. repeatedly talking about her blood pressure despite asking questions on other subjects. Denies any pain or discomfort. States that she had BM this morning after not having.         MEDICATIONS  (STANDING):  amLODIPine   Tablet 5 milliGRAM(s) Oral daily  atenolol  Tablet 25 milliGRAM(s) Oral <User Schedule>  atenolol  Tablet 50 milliGRAM(s) Oral at bedtime  cefTRIAXone   IVPB 2000 milliGRAM(s) IV Intermittent every 24 hours  heparin   Injectable 5000 Unit(s) SubCutaneous every 12 hours  lidocaine   4% Patch 1 Patch Transdermal daily  lidocaine   4% Patch 1 Patch Transdermal daily  losartan 100 milliGRAM(s) Oral daily  melatonin 6 milliGRAM(s) Oral at bedtime  oxybutynin XL 5 milliGRAM(s) Oral at bedtime  pantoprazole    Tablet 40 milliGRAM(s) Oral before breakfast  polyethylene glycol 3350 17 Gram(s) Oral daily  senna 2 Tablet(s) Oral at bedtime  ursodiol Capsule 300 milliGRAM(s) Oral every 12 hours    MEDICATIONS  (PRN):      Vital Signs Last 24 Hrs  T(C): 36.5 (19 Dec 2022 09:52), Max: 37.2 (18 Dec 2022 17:16)  T(F): 97.7 (19 Dec 2022 09:52), Max: 98.9 (18 Dec 2022 17:16)  HR: 78 (19 Dec 2022 09:52) (77 - 82)  BP: 178/68 (19 Dec 2022 09:52) (178/68 - 221/78)  BP(mean): --  RR: 18 (19 Dec 2022 09:52) (18 - 18)  SpO2: 98% (19 Dec 2022 09:52) (95% - 98%)  CAPILLARY BLOOD GLUCOSE        I&O's Summary    18 Dec 2022 07:01  -  19 Dec 2022 07:00  --------------------------------------------------------  IN: 0 mL / OUT: 630 mL / NET: -630 mL    19 Dec 2022 07:01  -  19 Dec 2022 13:11  --------------------------------------------------------  IN: 0 mL / OUT: 200 mL / NET: -200 mL        General: middle aged woman, NAD, awake and alert  Neck: Supple, trachea midline   Respiratory: No respiratory distress, CTABL, No rales, rhonchi, wheezing.  Cardiovascular: S1,S2; Regular rate and rhythm; No m/g/r.   Gastrointestinal: Soft, Nontender, Nondistended; +BS.   Extremities: No c/c/e; warm to touch  Skin: No rashes, No erythema   Psych: AAOx1, appropriate mood and affect    LABS:                        10.9   9.17  )-----------( 376      ( 19 Dec 2022 07:05 )             33.8     12-19    137  |  101  |  17  ----------------------------<  126<H>  4.0   |  24  |  1.24    Ca    9.1      19 Dec 2022 07:05  Phos  3.3     12-19  Mg     1.70     12-19    TPro  6.6  /  Alb  3.6  /  TBili  0.4  /  DBili  x   /  AST  28  /  ALT  71<H>  /  AlkPhos  160<H>  12-19              RADIOLOGY & ADDITIONAL TESTS:    Imaging Personally Reviewed:    Consultant(s) Notes Reviewed:      Care Discussed with Consultants/Other Providers: MARIA M Carrillo

## 2022-12-19 NOTE — PROVIDER CONTACT NOTE (OTHER) - RECOMMENDATIONS
give atenalol and tylenol recheck blood pressure in 1 hour give atenolol and Tylenol recheck blood pressure in 1 hour

## 2022-12-19 NOTE — PROVIDER CONTACT NOTE (OTHER) - ACTION/TREATMENT ORDERED:
BP taken manually and provider notified
Bp medications giver per MAR, Tylenol for headache recheck BP in an hour.

## 2022-12-19 NOTE — PROVIDER CONTACT NOTE (OTHER) - ASSESSMENT
Elevated blood pressure, headache Pt alert and oriented with occasional confusion. C/o headache, denies chest pain no SOB.    noted elevated blood pressure,

## 2022-12-20 LAB
CULTURE RESULTS: SIGNIFICANT CHANGE UP
SPECIMEN SOURCE: SIGNIFICANT CHANGE UP

## 2022-12-20 PROCEDURE — 93306 TTE W/DOPPLER COMPLETE: CPT | Mod: 26

## 2022-12-20 PROCEDURE — 99232 SBSQ HOSP IP/OBS MODERATE 35: CPT

## 2022-12-20 RX ADMIN — LOSARTAN POTASSIUM 100 MILLIGRAM(S): 100 TABLET, FILM COATED ORAL at 05:06

## 2022-12-20 RX ADMIN — HEPARIN SODIUM 5000 UNIT(S): 5000 INJECTION INTRAVENOUS; SUBCUTANEOUS at 17:25

## 2022-12-20 RX ADMIN — CEFTRIAXONE 100 MILLIGRAM(S): 500 INJECTION, POWDER, FOR SOLUTION INTRAMUSCULAR; INTRAVENOUS at 17:26

## 2022-12-20 RX ADMIN — URSODIOL 300 MILLIGRAM(S): 250 TABLET, FILM COATED ORAL at 05:06

## 2022-12-20 RX ADMIN — POLYETHYLENE GLYCOL 3350 17 GRAM(S): 17 POWDER, FOR SOLUTION ORAL at 13:50

## 2022-12-20 RX ADMIN — AMLODIPINE BESYLATE 5 MILLIGRAM(S): 2.5 TABLET ORAL at 05:08

## 2022-12-20 RX ADMIN — LIDOCAINE 1 PATCH: 4 CREAM TOPICAL at 00:47

## 2022-12-20 RX ADMIN — ATENOLOL 25 MILLIGRAM(S): 25 TABLET ORAL at 07:43

## 2022-12-20 RX ADMIN — Medication 6 MILLIGRAM(S): at 21:32

## 2022-12-20 RX ADMIN — Medication 5 MILLIGRAM(S): at 21:32

## 2022-12-20 RX ADMIN — ATENOLOL 50 MILLIGRAM(S): 25 TABLET ORAL at 21:33

## 2022-12-20 RX ADMIN — SENNA PLUS 2 TABLET(S): 8.6 TABLET ORAL at 21:33

## 2022-12-20 RX ADMIN — URSODIOL 300 MILLIGRAM(S): 250 TABLET, FILM COATED ORAL at 17:26

## 2022-12-20 RX ADMIN — HEPARIN SODIUM 5000 UNIT(S): 5000 INJECTION INTRAVENOUS; SUBCUTANEOUS at 05:05

## 2022-12-20 RX ADMIN — LIDOCAINE 1 PATCH: 4 CREAM TOPICAL at 13:48

## 2022-12-20 RX ADMIN — LIDOCAINE 1 PATCH: 4 CREAM TOPICAL at 13:49

## 2022-12-20 RX ADMIN — PANTOPRAZOLE SODIUM 40 MILLIGRAM(S): 20 TABLET, DELAYED RELEASE ORAL at 07:44

## 2022-12-20 NOTE — PROGRESS NOTE ADULT - SUBJECTIVE AND OBJECTIVE BOX
Patient is a 89y old  Female who presents with a chief complaint of Weakness (19 Dec 2022 13:05)    Obed Potts MD   [facility]   Pager [phone]  Reachable on [employer] Teams     SUBJECTIVE / OVERNIGHT EVENTS:  Patient seen and examined this morning.   She states that she is feeling much better today.   Denies any headaches or vision changes. No nausea or vomiting.     MEDICATIONS  (STANDING):  amLODIPine   Tablet 5 milliGRAM(s) Oral daily  atenolol  Tablet 25 milliGRAM(s) Oral <User Schedule>  atenolol  Tablet 50 milliGRAM(s) Oral at bedtime  cefTRIAXone   IVPB 2000 milliGRAM(s) IV Intermittent every 24 hours  heparin   Injectable 5000 Unit(s) SubCutaneous every 12 hours  lidocaine   4% Patch 1 Patch Transdermal daily  lidocaine   4% Patch 1 Patch Transdermal daily  losartan 100 milliGRAM(s) Oral daily  melatonin 6 milliGRAM(s) Oral at bedtime  oxybutynin XL 5 milliGRAM(s) Oral at bedtime  pantoprazole    Tablet 40 milliGRAM(s) Oral before breakfast  polyethylene glycol 3350 17 Gram(s) Oral daily  senna 2 Tablet(s) Oral at bedtime  ursodiol Capsule 300 milliGRAM(s) Oral every 12 hours    MEDICATIONS  (PRN):      Vital Signs Last 24 Hrs  T(C): 36.7 (20 Dec 2022 11:09), Max: 36.8 (20 Dec 2022 07:18)  T(F): 98.1 (20 Dec 2022 11:09), Max: 98.3 (20 Dec 2022 07:18)  HR: 79 (20 Dec 2022 11:09) (75 - 83)  BP: 169/73 (20 Dec 2022 11:09) (156/81 - 188/70)  BP(mean): --  RR: 17 (20 Dec 2022 11:09) (17 - 18)  SpO2: 97% (20 Dec 2022 11:09) (97% - 100%)  CAPILLARY BLOOD GLUCOSE        I&O's Summary    19 Dec 2022 07:01  -  20 Dec 2022 07:00  --------------------------------------------------------  IN: 0 mL / OUT: 1350 mL / NET: -1350 mL        General: elderly woman, NAD, awake and alert  Neck: Supple, trachea midline   Respiratory: No respiratory distress, CTABL, No rales, rhonchi, wheezing.  Cardiovascular: S1,S2; Regular rate and rhythm; No m/g/r.   Gastrointestinal: Soft, Nontender, Nondistended; +BS.   Extremities: No c/c/e; warm to touch  Skin: No rashes, No erythema   Psych: appropriate mood and affect    LABS:                        10.9   9.17  )-----------( 376      ( 19 Dec 2022 07:05 )             33.8     12-19    137  |  101  |  17  ----------------------------<  126<H>  4.0   |  24  |  1.24    Ca    9.1      19 Dec 2022 07:05  Phos  3.3     12-19  Mg     1.70     12-19    TPro  6.6  /  Alb  3.6  /  TBili  0.4  /  DBili  x   /  AST  28  /  ALT  71<H>  /  AlkPhos  160<H>  12-19              RADIOLOGY & ADDITIONAL TESTS:    Imaging Personally Reviewed:    Consultant(s) Notes Reviewed:      Care Discussed with Consultants/Other Providers:

## 2022-12-21 ENCOUNTER — TRANSCRIPTION ENCOUNTER (OUTPATIENT)
Age: 87
End: 2022-12-21

## 2022-12-21 LAB — SARS-COV-2 RNA SPEC QL NAA+PROBE: SIGNIFICANT CHANGE UP

## 2022-12-21 PROCEDURE — 99232 SBSQ HOSP IP/OBS MODERATE 35: CPT

## 2022-12-21 RX ORDER — AMLODIPINE BESYLATE 2.5 MG/1
5 TABLET ORAL ONCE
Refills: 0 | Status: COMPLETED | OUTPATIENT
Start: 2022-12-21 | End: 2022-12-21

## 2022-12-21 RX ORDER — AMLODIPINE BESYLATE 2.5 MG/1
10 TABLET ORAL DAILY
Refills: 0 | Status: DISCONTINUED | OUTPATIENT
Start: 2022-12-22 | End: 2022-12-22

## 2022-12-21 RX ORDER — SODIUM CHLORIDE 0.65 %
1 AEROSOL, SPRAY (ML) NASAL
Refills: 0 | Status: DISCONTINUED | OUTPATIENT
Start: 2022-12-21 | End: 2022-12-22

## 2022-12-21 RX ORDER — ACETAMINOPHEN 500 MG
650 TABLET ORAL EVERY 8 HOURS
Refills: 0 | Status: DISCONTINUED | OUTPATIENT
Start: 2022-12-21 | End: 2022-12-22

## 2022-12-21 RX ADMIN — Medication 5 MILLIGRAM(S): at 22:22

## 2022-12-21 RX ADMIN — SENNA PLUS 2 TABLET(S): 8.6 TABLET ORAL at 22:22

## 2022-12-21 RX ADMIN — POLYETHYLENE GLYCOL 3350 17 GRAM(S): 17 POWDER, FOR SOLUTION ORAL at 13:26

## 2022-12-21 RX ADMIN — Medication 650 MILLIGRAM(S): at 20:00

## 2022-12-21 RX ADMIN — Medication 6 MILLIGRAM(S): at 22:22

## 2022-12-21 RX ADMIN — LIDOCAINE 1 PATCH: 4 CREAM TOPICAL at 13:24

## 2022-12-21 RX ADMIN — LOSARTAN POTASSIUM 100 MILLIGRAM(S): 100 TABLET, FILM COATED ORAL at 05:49

## 2022-12-21 RX ADMIN — LIDOCAINE 1 PATCH: 4 CREAM TOPICAL at 01:26

## 2022-12-21 RX ADMIN — HEPARIN SODIUM 5000 UNIT(S): 5000 INJECTION INTRAVENOUS; SUBCUTANEOUS at 05:49

## 2022-12-21 RX ADMIN — URSODIOL 300 MILLIGRAM(S): 250 TABLET, FILM COATED ORAL at 19:09

## 2022-12-21 RX ADMIN — ATENOLOL 25 MILLIGRAM(S): 25 TABLET ORAL at 07:43

## 2022-12-21 RX ADMIN — Medication 1 SPRAY(S): at 19:10

## 2022-12-21 RX ADMIN — HEPARIN SODIUM 5000 UNIT(S): 5000 INJECTION INTRAVENOUS; SUBCUTANEOUS at 19:09

## 2022-12-21 RX ADMIN — URSODIOL 300 MILLIGRAM(S): 250 TABLET, FILM COATED ORAL at 05:49

## 2022-12-21 RX ADMIN — ATENOLOL 50 MILLIGRAM(S): 25 TABLET ORAL at 22:22

## 2022-12-21 RX ADMIN — CEFTRIAXONE 100 MILLIGRAM(S): 500 INJECTION, POWDER, FOR SOLUTION INTRAMUSCULAR; INTRAVENOUS at 19:09

## 2022-12-21 RX ADMIN — AMLODIPINE BESYLATE 5 MILLIGRAM(S): 2.5 TABLET ORAL at 05:49

## 2022-12-21 RX ADMIN — Medication 650 MILLIGRAM(S): at 19:09

## 2022-12-21 RX ADMIN — AMLODIPINE BESYLATE 5 MILLIGRAM(S): 2.5 TABLET ORAL at 09:58

## 2022-12-21 RX ADMIN — PANTOPRAZOLE SODIUM 40 MILLIGRAM(S): 20 TABLET, DELAYED RELEASE ORAL at 05:49

## 2022-12-21 NOTE — DISCHARGE NOTE PROVIDER - DETAILS OF MALNUTRITION DIAGNOSIS/DIAGNOSES
This patient has been assessed with a concern for Malnutrition and was treated during this hospitalization for the following Nutrition diagnosis/diagnoses:     -  12/18/2022: Moderate protein-calorie malnutrition

## 2022-12-21 NOTE — DISCHARGE NOTE PROVIDER - NSDCMRMEDTOKEN_GEN_ALL_CORE_FT
Ditropan XL 5 mg/24 hours oral tablet, extended release: 1 tab(s) orally once a day at bedtime  eszopiclone 1 mg oral tablet: 3 tab(s) orally once a day (at bedtime)  irbesartan 300 mg oral tablet: 1 tab(s) orally once a day  NexIUM 20 mg oral delayed release capsule: 1 cap(s) orally once a day  Tenormin 25 mg oral tablet: 1 tab(s) orally once a day in AM  Tenormin 25 mg oral tablet: 2 tab(s) orally once a day at bedtime  Vitamin B-12 1000 mcg oral tablet: 1 tab(s) orally once a day  Vitamin D3: 1000 unit(s) orally once a day   amLODIPine 10 mg oral tablet: 1 tab(s) orally once a day  Cipro 500 mg oral tablet: 1 tab(s) orally every 12 hours  first dose given prior to d/c  Ditropan XL 5 mg/24 hours oral tablet, extended release: 1 tab(s) orally once a day at bedtime  irbesartan 300 mg oral tablet: 1 tab(s) orally once a day  lidocaine 4% topical film: Apply topically to affected area once a day  lidocaine 4% topical film: Apply topically to affected area once a day to back  pantoprazole 40 mg oral delayed release tablet: 1 tab(s) orally once a day (before a meal)  polyethylene glycol 3350 oral powder for reconstitution: 17 gram(s) orally once a day  senna leaf extract oral tablet: 2 tab(s) orally once a day (at bedtime)  sodium chloride 0.65% nasal spray: 1  nasal every 2 hours, As Needed for congestion  Tenormin 25 mg oral tablet: 1 tab(s) orally once a day in AM  Tenormin 25 mg oral tablet: 2 tab(s) orally once a day at bedtime  ursodiol 300 mg oral capsule: 1 cap(s) orally every 12 hours  Vitamin B-12 1000 mcg oral tablet: 1 tab(s) orally once a day  Vitamin D3: 1000 unit(s) orally once a day   amLODIPine 10 mg oral tablet: 1 tab(s) orally once a day  Cipro 500 mg oral tablet: 1 tab(s) orally every 24 hours  Ditropan XL 5 mg/24 hours oral tablet, extended release: 1 tab(s) orally once a day at bedtime  irbesartan 300 mg oral tablet: 1 tab(s) orally once a day  lidocaine 4% topical film: Apply topically to affected area once a day  lidocaine 4% topical film: Apply topically to affected area once a day to back  pantoprazole 40 mg oral delayed release tablet: 1 tab(s) orally once a day (before a meal)  polyethylene glycol 3350 oral powder for reconstitution: 17 gram(s) orally once a day  senna leaf extract oral tablet: 2 tab(s) orally once a day (at bedtime)  sodium chloride 0.65% nasal spray: 1  nasal every 2 hours, As Needed for congestion  Tenormin 25 mg oral tablet: 1 tab(s) orally once a day in AM  Tenormin 25 mg oral tablet: 2 tab(s) orally once a day at bedtime  ursodiol 300 mg oral capsule: 1 cap(s) orally every 12 hours  Vitamin B-12 1000 mcg oral tablet: 1 tab(s) orally once a day  Vitamin D3: 1000 unit(s) orally once a day   amLODIPine 10 mg oral tablet: 1 tab(s) orally once a day  Cipro 500 mg oral tablet: 1 tab(s) orally every 24 hours  Ditropan XL 5 mg/24 hours oral tablet, extended release: 1 tab(s) orally once a day at bedtime  irbesartan 300 mg oral tablet: 1 tab(s) orally once a day  resume 12/23  lidocaine 4% topical film: Apply topically to affected area once a day  lidocaine 4% topical film: Apply topically to affected area once a day to back  pantoprazole 40 mg oral delayed release tablet: 1 tab(s) orally once a day (before a meal)  polyethylene glycol 3350 oral powder for reconstitution: 17 gram(s) orally once a day  senna leaf extract oral tablet: 2 tab(s) orally once a day (at bedtime)  sodium chloride 0.65% nasal spray: 1  nasal every 2 hours, As Needed for congestion  Tenormin 25 mg oral tablet: 1 tab(s) orally once a day in AM  Tenormin 25 mg oral tablet: 2 tab(s) orally once a day at bedtime  ursodiol 300 mg oral capsule: 1 cap(s) orally every 12 hours  Vitamin B-12 1000 mcg oral tablet: 1 tab(s) orally once a day  Vitamin D3: 1000 unit(s) orally once a day

## 2022-12-21 NOTE — PROGRESS NOTE ADULT - SUBJECTIVE AND OBJECTIVE BOX
Patient is a 89y old  Female who presents with a chief complaint of Weakness (20 Dec 2022 14:49)    Obed Potts MD   [facility]   Pager [phone]  Reachable on [employer] Teams     SUBJECTIVE / OVERNIGHT EVENTS:  Patient seen and examined this morning. She states that she is feeling much better.  She states that she has not been sleeping well, but denies any headaches, vision changes, light headedness  walking to the bathroom without difficulties.     MEDICATIONS  (STANDING):  atenolol  Tablet 25 milliGRAM(s) Oral <User Schedule>  atenolol  Tablet 50 milliGRAM(s) Oral at bedtime  cefTRIAXone   IVPB 2000 milliGRAM(s) IV Intermittent every 24 hours  heparin   Injectable 5000 Unit(s) SubCutaneous every 12 hours  lidocaine   4% Patch 1 Patch Transdermal daily  lidocaine   4% Patch 1 Patch Transdermal daily  losartan 100 milliGRAM(s) Oral daily  melatonin 6 milliGRAM(s) Oral at bedtime  oxybutynin XL 5 milliGRAM(s) Oral at bedtime  pantoprazole    Tablet 40 milliGRAM(s) Oral before breakfast  polyethylene glycol 3350 17 Gram(s) Oral daily  senna 2 Tablet(s) Oral at bedtime  ursodiol Capsule 300 milliGRAM(s) Oral every 12 hours    MEDICATIONS  (PRN):  acetaminophen     Tablet .. 650 milliGRAM(s) Oral every 8 hours PRN Temp greater or equal to 38C (100.4F), Mild Pain (1 - 3), Moderate Pain (4 - 6)  sodium chloride 0.65% Nasal 1 Spray(s) Both Nostrils every 2 hours PRN Nasal Congestion      Vital Signs Last 24 Hrs  T(C): 36.4 (21 Dec 2022 09:31), Max: 37 (20 Dec 2022 17:25)  T(F): 97.6 (21 Dec 2022 09:31), Max: 98.6 (20 Dec 2022 17:25)  HR: 72 (21 Dec 2022 09:31) (72 - 80)  BP: 143/76 (21 Dec 2022 09:31) (143/76 - 179/65)  BP(mean): --  RR: 20 (21 Dec 2022 09:31) (18 - 20)  SpO2: 97% (21 Dec 2022 09:31) (94% - 98%)  CAPILLARY BLOOD GLUCOSE        I&O's Summary      General: elderly woman, NAD, awake and alert  Neck: Supple, trachea midline   Respiratory: No respiratory distress, CTABL, No rales, rhonchi, wheezing.  Cardiovascular: S1,S2; Regular rate and rhythm; No m/g/r.   Gastrointestinal: Soft, Nontender, Nondistended; +BS.   Extremities: No c/c/e; warm to touch  Skin: No rashes, No erythema   Psych: appropriate mood and affect    LABS:                    RADIOLOGY & ADDITIONAL TESTS:    Imaging Personally Reviewed:    Consultant(s) Notes Reviewed:      Care Discussed with Consultants/Other Providers:   Patient is a 89y old  Female who presents with a chief complaint of Weakness (20 Dec 2022 14:49)    Obed Potts MD   [facility] Medicine   Pager [phone]  Reachable on [employer] Teams     SUBJECTIVE / OVERNIGHT EVENTS:  Patient seen and examined this morning. She states that she is feeling much better.  She states that she has not been sleeping well, but denies any headaches, vision changes, light headedness  walking to the bathroom without difficulties.     MEDICATIONS  (STANDING):  atenolol  Tablet 25 milliGRAM(s) Oral <User Schedule>  atenolol  Tablet 50 milliGRAM(s) Oral at bedtime  cefTRIAXone   IVPB 2000 milliGRAM(s) IV Intermittent every 24 hours  heparin   Injectable 5000 Unit(s) SubCutaneous every 12 hours  lidocaine   4% Patch 1 Patch Transdermal daily  lidocaine   4% Patch 1 Patch Transdermal daily  losartan 100 milliGRAM(s) Oral daily  melatonin 6 milliGRAM(s) Oral at bedtime  oxybutynin XL 5 milliGRAM(s) Oral at bedtime  pantoprazole    Tablet 40 milliGRAM(s) Oral before breakfast  polyethylene glycol 3350 17 Gram(s) Oral daily  senna 2 Tablet(s) Oral at bedtime  ursodiol Capsule 300 milliGRAM(s) Oral every 12 hours    MEDICATIONS  (PRN):  acetaminophen     Tablet .. 650 milliGRAM(s) Oral every 8 hours PRN Temp greater or equal to 38C (100.4F), Mild Pain (1 - 3), Moderate Pain (4 - 6)  sodium chloride 0.65% Nasal 1 Spray(s) Both Nostrils every 2 hours PRN Nasal Congestion      Vital Signs Last 24 Hrs  T(C): 36.4 (21 Dec 2022 09:31), Max: 37 (20 Dec 2022 17:25)  T(F): 97.6 (21 Dec 2022 09:31), Max: 98.6 (20 Dec 2022 17:25)  HR: 72 (21 Dec 2022 09:31) (72 - 80)  BP: 143/76 (21 Dec 2022 09:31) (143/76 - 179/65)  BP(mean): --  RR: 20 (21 Dec 2022 09:31) (18 - 20)  SpO2: 97% (21 Dec 2022 09:31) (94% - 98%)  CAPILLARY BLOOD GLUCOSE        I&O's Summary      General: elderly woman, NAD, awake and alert  Neck: Supple, trachea midline   Respiratory: No respiratory distress, CTABL, No rales, rhonchi, wheezing.  Cardiovascular: S1,S2; Regular rate and rhythm; No m/g/r.   Gastrointestinal: Soft, Nontender, Nondistended; +BS.   Extremities: No c/c/e; warm to touch  Skin: No rashes, No erythema   Psych: AAOx3, appropriate mood and affect    LABS:                    RADIOLOGY & ADDITIONAL TESTS:    Imaging Personally Reviewed:    Consultant(s) Notes Reviewed:      Care Discussed with Consultants/Other Providers:

## 2022-12-21 NOTE — DISCHARGE NOTE PROVIDER - NSDCCPCAREPLAN_GEN_ALL_CORE_FT
PRINCIPAL DISCHARGE DIAGNOSIS  Diagnosis: Sepsis due to Escherichia coli  Assessment and Plan of Treatment: You have bacteremia (blood bacterial infection due to EColi), which was likely from your recent UTI prior to admission. Please continue antibiotics as directed: _____________________. Return to ER or call 911 for altered mental status, fever/chills, pain, shortness of breath, or any new or worsening symptoms.      SECONDARY DISCHARGE DIAGNOSES  Diagnosis: Delirium  Assessment and Plan of Treatment: Please continue your medications as prescribed and allow help with daily acitivities of living, get stronger at PT Rehab.  Maintain a safe environment and make movements in a careful manner to prevent falls. Ensure that you are eating and drinking adequately and maintaining a healthy sleep cycle.   In order to enhance patient's overall well-being and clinical course, please try avoiding benzodiazepines, anticholinergics, and antihistamines (Can cause worsening confusion/delirium). Additionally, continue reorientation, supportive care, maintaining regular sleep/wake cycle, and optimizing nutritional/medical factors.    Diagnosis: Falls  Assessment and Plan of Treatment: Fall precautions: keep your area clutter-free, place night lights in hallways and stairwells, and add large, flat area- non slip rugs but AVOID small throw rugs which are a tripping hazard.  Practice your PT exercises daily to improve muscle strength and avoid deconditioning.     PRINCIPAL DISCHARGE DIAGNOSIS  Diagnosis: Sepsis due to Escherichia coli  Assessment and Plan of Treatment: You have bacteremia (blood bacterial infection due to EColi), which was likely from your recent UTI prior to admission. Please continue antibiotics as directed: _____________________. Return to ER or call 911 for altered mental status, fever/chills, pain, shortness of breath, or any new or worsening symptoms.      SECONDARY DISCHARGE DIAGNOSES  Diagnosis: Hypertension  Assessment and Plan of Treatment: Your new medications are _____  Continue blood pressure medication regimen as directed. Monitor for any visual changes, headaches or dizziness and return to ER or call 911 if these occur.  Monitor blood pressure regularly.  Follow up with your primary care provider for further management for high blood pressure.    Diagnosis: Delirium  Assessment and Plan of Treatment: Please continue your medications as prescribed and allow help with daily acitivities of living, get stronger at PT Rehab.  Maintain a safe environment and make movements in a careful manner to prevent falls. Ensure that you are eating and drinking adequately and maintaining a healthy sleep cycle.   In order to enhance patient's overall well-being and clinical course, please try avoiding benzodiazepines, anticholinergics, and antihistamines (Can cause worsening confusion/delirium). Additionally, continue reorientation, supportive care, maintaining regular sleep/wake cycle, and optimizing nutritional/medical factors.    Diagnosis: Falls  Assessment and Plan of Treatment: Fall precautions: keep your area clutter-free, place night lights in hallways and stairwells, and add large, flat area- non slip rugs but AVOID small throw rugs which are a tripping hazard.  Practice your PT exercises daily to improve muscle strength and avoid deconditioning.     PRINCIPAL DISCHARGE DIAGNOSIS  Diagnosis: Sepsis due to Escherichia coli  Assessment and Plan of Treatment: You have bacteremia (blood bacterial infection due to EColi), which was likely from your recent. You were started on antibiotics for a urinary infection. To help prevent future infections maintain healthy hygiene and avoid taking long baths. Wipe from front to back and empty your bladder frequently by keeping yourself properly hydrated. Monitor for signs/symptoms of infection, such as, fever/chills, burning/pain with urination, urinary frequency/hesitancy, cloudy urine, or blood in urine and follow-up with your primary care provider as outpatient for further care. prior to admission. Please continue antibiotics as directed: Cipro 500 mg twice a day x 4 days . First dose given prior to d/c      SECONDARY DISCHARGE DIAGNOSES  Diagnosis: Falls  Assessment and Plan of Treatment: Fall precautions: keep your area clutter-free, place night lights in hallways and stairwells, and add large, flat area- non slip rugs but AVOID small throw rugs which are a tripping hazard.  Practice your PT exercises daily to improve muscle strength and avoid deconditioning.    Diagnosis: Delirium  Assessment and Plan of Treatment: Please continue your medications as prescribed and allow help with daily acitivities of living, get stronger at PT Rehab.  Maintain a safe environment and make movements in a careful manner to prevent falls. Ensure that you are eating and drinking adequately and maintaining a healthy sleep cycle.   In order to enhance patient's overall well-being and clinical course, please try avoiding benzodiazepines, anticholinergics, and antihistamines (Can cause worsening confusion/delirium). Additionally, continue reorientation, supportive care, maintaining regular sleep/wake cycle, and optimizing nutritional/medical factors.    Diagnosis: Hypertension  Assessment and Plan of Treatment: Continue blood pressure medication regimen as directed. Monitor for any visual changes, headaches or dizziness and return to ER or call 911 if these occur.  Monitor blood pressure regularly.  Follow up with your primary care provider for further management for high blood pressure.  Takes irbesartan 300 mg at home.  Amlodipine added while inpatient     PRINCIPAL DISCHARGE DIAGNOSIS  Diagnosis: Sepsis due to Escherichia coli  Assessment and Plan of Treatment: You have bacteremia (blood bacterial infection due to EColi), which was likely from your recent. You were started on antibiotics for a urinary infection. To help prevent future infections maintain healthy hygiene and avoid taking long baths. Wipe from front to back and empty your bladder frequently by keeping yourself properly hydrated. Monitor for signs/symptoms of infection, such as, fever/chills, burning/pain with urination, urinary frequency/hesitancy, cloudy urine, or blood in urine and follow-up with your primary care provider as outpatient for further care. prior to admission. Please continue antibiotics as directed: Cipro 500 mg x 4 days . First dose given prior to d/c      SECONDARY DISCHARGE DIAGNOSES  Diagnosis: Falls  Assessment and Plan of Treatment: Fall precautions: keep your area clutter-free, place night lights in hallways and stairwells, and add large, flat area- non slip rugs but AVOID small throw rugs which are a tripping hazard.  Practice your PT exercises daily to improve muscle strength and avoid deconditioning.    Diagnosis: Delirium  Assessment and Plan of Treatment: Please continue your medications as prescribed and allow help with daily acitivities of living, get stronger at PT Rehab.  Maintain a safe environment and make movements in a careful manner to prevent falls. Ensure that you are eating and drinking adequately and maintaining a healthy sleep cycle.   In order to enhance patient's overall well-being and clinical course, please try avoiding benzodiazepines, anticholinergics, and antihistamines (Can cause worsening confusion/delirium). Additionally, continue reorientation, supportive care, maintaining regular sleep/wake cycle, and optimizing nutritional/medical factors.    Diagnosis: Hypertension  Assessment and Plan of Treatment: Continue blood pressure medication regimen as directed. Monitor for any visual changes, headaches or dizziness and return to ER or call 911 if these occur.  Monitor blood pressure regularly.  Follow up with your primary care provider for further management for high blood pressure.  Takes irbesartan 300 mg at home.- resume in AM  Amlodipine added while inpatient  continue to titrate as needed     PRINCIPAL DISCHARGE DIAGNOSIS  Diagnosis: Sepsis due to Escherichia coli  Assessment and Plan of Treatment: You have bacteremia (blood bacterial infection due to EColi), which was likely from your recent. You were started on antibiotics for a urinary infection. To help prevent future infections maintain healthy hygiene and avoid taking long baths. Wipe from front to back and empty your bladder frequently by keeping yourself properly hydrated. Monitor for signs/symptoms of infection, such as, fever/chills, burning/pain with urination, urinary frequency/hesitancy, cloudy urine, or blood in urine and follow-up with your primary care provider as outpatient for further care. prior to admission. Please continue antibiotics as directed: Cipro 500 mg x daily x 4 days . First dose given prior to d/c      SECONDARY DISCHARGE DIAGNOSES  Diagnosis: Falls  Assessment and Plan of Treatment: Fall precautions: keep your area clutter-free, place night lights in hallways and stairwells, and add large, flat area- non slip rugs but AVOID small throw rugs which are a tripping hazard.  Practice your PT exercises daily to improve muscle strength and avoid deconditioning.    Diagnosis: Delirium  Assessment and Plan of Treatment: Please continue your medications as prescribed and allow help with daily acitivities of living, get stronger at PT Rehab.  Maintain a safe environment and make movements in a careful manner to prevent falls. Ensure that you are eating and drinking adequately and maintaining a healthy sleep cycle.   In order to enhance patient's overall well-being and clinical course, please try avoiding benzodiazepines, anticholinergics, and antihistamines (Can cause worsening confusion/delirium). Additionally, continue reorientation, supportive care, maintaining regular sleep/wake cycle, and optimizing nutritional/medical factors.    Diagnosis: Hypertension  Assessment and Plan of Treatment: Continue blood pressure medication regimen as directed. Monitor for any visual changes, headaches or dizziness and return to ER or call 911 if these occur.  Monitor blood pressure regularly.  Follow up with your primary care provider for further management for high blood pressure.  Takes irbesartan 300 mg at home.- resume in AM  Amlodipine added while inpatient  continue to titrate as needed    Diagnosis: DIMITRI (acute kidney injury)  Assessment and Plan of Treatment: Cr bumped to 1.49  received IV fluids and encouraged to increase fluid intake  Cipro renally dosed to 500 mg Q daily   resume irbesartan in AM   Follow up BMP

## 2022-12-21 NOTE — DISCHARGE NOTE PROVIDER - NSDCFUSCHEDAPPT_GEN_ALL_CORE_FT
[facility]  [facility] [address] [facility]  Scheduled Appointment: 01/31/2023

## 2022-12-21 NOTE — DISCHARGE NOTE PROVIDER - HOSPITAL COURSE
89 year old female with past medical history of HTN, HLD, OA presents with weakness, AMS for 3 days. As per her son, patient has had decreased PO intake and generalized weakness. Admitted to medicine service for treatment of UTI.      Sepsis due to Escherichia coli.   Sepsis POA. Presented Febrile T max 100.3 in ED w/leukocytosis & tachycardia. Sepsis now resolved   likely secondary to UTI  US abdomen - showed billary sludge, equivocal for cholecystitis   CT abdomen - diverticulosis without diverticulitis, otherwise negative for infection in the abdomen or pelvis.  BCx 12/15 - E. coli in 4/4 bottles. Repeat culture 12/17 NGTD   UCx showed polymicrobial growth   Received IV ceftriaxone Transition to oral cipro 500 BID on discharge for total 10 day course, 12/16-12/25.      Urinary tract infection, acute.   Had similar presentation Jan 2022 for which she was treated for UTI w/resolution of symptoms  UA borderline, however given urinary symptoms and positive growth suspect likely source of infection  CT abdomen and pelvis without evidence of intraabdominal infection   D/C with Rx for Cipro x 3 days to complete 10 day course    Delirium.   # Metabolic encephalopathy in setting of sepsis  resolved   Patient paranoid, per son was seeing bugs on walls at home which were not physically present  in Ed was confusing son Irvin with spouse (who passed approximately 15 years ago)    Hyponatremia.   secondary to hypovolemia in setting of decreased PO intake  resolved with IVF    Cholelithiasis without cholecystitis.   RUQ US performed with concern for acute cholecystitis  Patient was seen by surgery given no abdominal pain & negative Solano sign acute handy unlikely   No surgical intervention required      Transaminitis.   elevated LFTS, was present on previous admission as well  no abdominal pain  likely due to underlying infection, CTM.    Falls.   Questionable fall at home,  unwitnessed  CTH showed aged related changed  TTE showed preserved LVEF with calcified aortic valve with mild-moderate AR, normal opening.   PT eval -> CHELO.    Arthritis.   Takes tylenol as needed at home (has been taking approximately every other day as needed)  Continue with tylenol.    Hypertension.   BP elevated   continue home atenalol and ARB  increased amlodipine to 10mg   continue to titrate as needed.    Insomnia.   Takes Lunesta 3mg at bedtime at home  melatonin for insomnia.      Hospital course discussed with medical attending. Patient is medically stable for discharge to Rehab   89 year old female with past medical history of HTN, HLD, OA presents with weakness, AMS for 3 days. As per her son, patient has had decreased PO intake and generalized weakness. Admitted to medicine service for treatment of UTI.      Sepsis due to Escherichia coli.   Sepsis POA. Presented Febrile T max 100.3 in ED w/leukocytosis & tachycardia. Sepsis now resolved   likely secondary to UTI  US abdomen - showed billary sludge, equivocal for cholecystitis   CT abdomen - diverticulosis without diverticulitis, otherwise negative for infection in the abdomen or pelvis.  BCx 12/15 - E. coli in 4/4 bottles. Repeat culture 12/17 NGTD   UCx showed polymicrobial growth   Received IV ceftriaxone Transition to oral cipro 500mg q 24( renal dose) on discharge for total 10 day course, 12/16-12/25.      Urinary tract infection, acute.   Had similar presentation Jan 2022 for which she was treated for UTI w/resolution of symptoms  UA borderline, however given urinary symptoms and positive growth suspect likely source of infection  CT abdomen and pelvis without evidence of intraabdominal infection   D/C with Rx for Cipro x 4 days to complete 10 day course    Delirium.   # Metabolic encephalopathy in setting of sepsis  resolved   Patient paranoid, per son was seeing bugs on walls at home which were not physically present  in Ed was confusing son Irvin with spouse (who passed approximately 15 years ago)    Hyponatremia.   secondary to hypovolemia in setting of decreased PO intake  resolved with IVF    Cholelithiasis without cholecystitis.   RUQ US performed with concern for acute cholecystitis  Patient was seen by surgery given no abdominal pain & negative Solaon sign acute handy unlikely   No surgical intervention required      Transaminitis.   elevated LFTS, was present on previous admission as well  no abdominal pain  likely due to underlying infection, CTM.    Falls.   Questionable fall at home,  unwitnessed  CTH showed aged related changed  TTE showed preserved LVEF with calcified aortic valve with mild-moderate AR, normal opening.   PT eval -> [facility].    Arthritis.   Takes tylenol as needed at home (has been taking approximately every other day as needed)  Continue with tylenol.    Hypertension.   BP elevated   continue home atenalol and ARB  increased amlodipine to 10mg   continue to titrate as needed.    Insomnia.   Takes Lunesta 3mg at bedtime at home  melatonin for insomnia.      Hospital course discussed with medical attending. Patient is medically stable for discharge to Rehab

## 2022-12-22 ENCOUNTER — TRANSCRIPTION ENCOUNTER (OUTPATIENT)
Age: 87
End: 2022-12-22

## 2022-12-22 VITALS
SYSTOLIC BLOOD PRESSURE: 147 MMHG | TEMPERATURE: 98 F | DIASTOLIC BLOOD PRESSURE: 60 MMHG | RESPIRATION RATE: 18 BRPM | HEART RATE: 75 BPM | OXYGEN SATURATION: 100 %

## 2022-12-22 DIAGNOSIS — N17.9 ACUTE KIDNEY FAILURE, UNSPECIFIED: ICD-10-CM

## 2022-12-22 LAB
ALBUMIN SERPL ELPH-MCNC: 3.6 G/DL — SIGNIFICANT CHANGE UP (ref 3.3–5)
ALP SERPL-CCNC: 97 U/L — SIGNIFICANT CHANGE UP (ref 40–120)
ALT FLD-CCNC: 31 U/L — SIGNIFICANT CHANGE UP (ref 4–33)
ANION GAP SERPL CALC-SCNC: 14 MMOL/L — SIGNIFICANT CHANGE UP (ref 7–14)
AST SERPL-CCNC: 23 U/L — SIGNIFICANT CHANGE UP (ref 4–32)
BASOPHILS # BLD AUTO: 0.23 K/UL — HIGH (ref 0–0.2)
BASOPHILS NFR BLD AUTO: 1.8 % — SIGNIFICANT CHANGE UP (ref 0–2)
BILIRUB SERPL-MCNC: 0.3 MG/DL — SIGNIFICANT CHANGE UP (ref 0.2–1.2)
BUN SERPL-MCNC: 38 MG/DL — HIGH (ref 7–23)
CALCIUM SERPL-MCNC: 9.5 MG/DL — SIGNIFICANT CHANGE UP (ref 8.4–10.5)
CHLORIDE SERPL-SCNC: 102 MMOL/L — SIGNIFICANT CHANGE UP (ref 98–107)
CO2 SERPL-SCNC: 22 MMOL/L — SIGNIFICANT CHANGE UP (ref 22–31)
CREAT SERPL-MCNC: 1.49 MG/DL — HIGH (ref 0.5–1.3)
CULTURE RESULTS: SIGNIFICANT CHANGE UP
EGFR: 32 ML/MIN/1.73M2 — LOW
EOSINOPHIL # BLD AUTO: 0.22 K/UL — SIGNIFICANT CHANGE UP (ref 0–0.5)
EOSINOPHIL NFR BLD AUTO: 1.7 % — SIGNIFICANT CHANGE UP (ref 0–6)
GLUCOSE SERPL-MCNC: 110 MG/DL — HIGH (ref 70–99)
HCT VFR BLD CALC: 31.6 % — LOW (ref 34.5–45)
HGB BLD-MCNC: 10.1 G/DL — LOW (ref 11.5–15.5)
IANC: 8.09 K/UL — HIGH (ref 1.8–7.4)
LYMPHOCYTES # BLD AUTO: 18.4 % — SIGNIFICANT CHANGE UP (ref 13–44)
LYMPHOCYTES # BLD AUTO: 2.39 K/UL — SIGNIFICANT CHANGE UP (ref 1–3.3)
MACROCYTES BLD QL: SIGNIFICANT CHANGE UP
MANUAL SMEAR VERIFICATION: SIGNIFICANT CHANGE UP
MCHC RBC-ENTMCNC: 30.1 PG — SIGNIFICANT CHANGE UP (ref 27–34)
MCHC RBC-ENTMCNC: 32 GM/DL — SIGNIFICANT CHANGE UP (ref 32–36)
MCV RBC AUTO: 94.3 FL — SIGNIFICANT CHANGE UP (ref 80–100)
MICROCYTES BLD QL: SLIGHT — SIGNIFICANT CHANGE UP
MONOCYTES # BLD AUTO: 1.6 K/UL — HIGH (ref 0–0.9)
MONOCYTES NFR BLD AUTO: 12.3 % — SIGNIFICANT CHANGE UP (ref 2–14)
NEUTROPHILS # BLD AUTO: 7.53 K/UL — HIGH (ref 1.8–7.4)
NEUTROPHILS NFR BLD AUTO: 56.1 % — SIGNIFICANT CHANGE UP (ref 43–77)
NEUTS BAND # BLD: 1.8 % — SIGNIFICANT CHANGE UP (ref 0–6)
OVALOCYTES BLD QL SMEAR: SLIGHT — SIGNIFICANT CHANGE UP
PLAT MORPH BLD: NORMAL — SIGNIFICANT CHANGE UP
PLATELET # BLD AUTO: 434 K/UL — HIGH (ref 150–400)
PLATELET COUNT - ESTIMATE: NORMAL — SIGNIFICANT CHANGE UP
POTASSIUM SERPL-MCNC: 5.1 MMOL/L — SIGNIFICANT CHANGE UP (ref 3.5–5.3)
POTASSIUM SERPL-SCNC: 5.1 MMOL/L — SIGNIFICANT CHANGE UP (ref 3.5–5.3)
PROT SERPL-MCNC: 6.3 G/DL — SIGNIFICANT CHANGE UP (ref 6–8.3)
RBC # BLD: 3.35 M/UL — LOW (ref 3.8–5.2)
RBC # FLD: 13.1 % — SIGNIFICANT CHANGE UP (ref 10.3–14.5)
RBC BLD AUTO: NORMAL — SIGNIFICANT CHANGE UP
SODIUM SERPL-SCNC: 138 MMOL/L — SIGNIFICANT CHANGE UP (ref 135–145)
SPECIMEN SOURCE: SIGNIFICANT CHANGE UP
VARIANT LYMPHS # BLD: 7.9 % — HIGH (ref 0–6)
WBC # BLD: 13 K/UL — HIGH (ref 3.8–10.5)
WBC # FLD AUTO: 13 K/UL — HIGH (ref 3.8–10.5)

## 2022-12-22 PROCEDURE — 99239 HOSP IP/OBS DSCHRG MGMT >30: CPT

## 2022-12-22 RX ORDER — PANTOPRAZOLE SODIUM 20 MG/1
1 TABLET, DELAYED RELEASE ORAL
Qty: 0 | Refills: 0 | DISCHARGE
Start: 2022-12-22

## 2022-12-22 RX ORDER — AMLODIPINE BESYLATE 2.5 MG/1
1 TABLET ORAL
Qty: 0 | Refills: 0 | DISCHARGE
Start: 2022-12-22

## 2022-12-22 RX ORDER — IRBESARTAN 75 MG/1
1 TABLET ORAL
Qty: 0 | Refills: 0 | DISCHARGE

## 2022-12-22 RX ORDER — ESZOPICLONE 2 MG/1
3 TABLET, COATED ORAL
Qty: 0 | Refills: 0 | DISCHARGE

## 2022-12-22 RX ORDER — POLYETHYLENE GLYCOL 3350 17 G/17G
17 POWDER, FOR SOLUTION ORAL
Qty: 0 | Refills: 0 | DISCHARGE
Start: 2022-12-22

## 2022-12-22 RX ORDER — URSODIOL 250 MG/1
1 TABLET, FILM COATED ORAL
Qty: 0 | Refills: 0 | DISCHARGE
Start: 2022-12-22

## 2022-12-22 RX ORDER — SODIUM CHLORIDE 9 MG/ML
1000 INJECTION INTRAMUSCULAR; INTRAVENOUS; SUBCUTANEOUS
Refills: 0 | Status: DISCONTINUED | OUTPATIENT
Start: 2022-12-22 | End: 2022-12-22

## 2022-12-22 RX ORDER — LIDOCAINE 4 G/100G
1 CREAM TOPICAL
Qty: 0 | Refills: 0 | DISCHARGE
Start: 2022-12-22

## 2022-12-22 RX ORDER — CIPROFLOXACIN LACTATE 400MG/40ML
1 VIAL (ML) INTRAVENOUS
Qty: 0 | Refills: 0 | DISCHARGE
Start: 2022-12-22 | End: 2022-12-25

## 2022-12-22 RX ORDER — IRBESARTAN 75 MG/1
1 TABLET ORAL
Qty: 0 | Refills: 0 | DISCHARGE
Start: 2022-12-22

## 2022-12-22 RX ORDER — ESOMEPRAZOLE MAGNESIUM 40 MG/1
1 CAPSULE, DELAYED RELEASE ORAL
Qty: 0 | Refills: 0 | DISCHARGE

## 2022-12-22 RX ORDER — SODIUM CHLORIDE 0.65 %
1 AEROSOL, SPRAY (ML) NASAL
Qty: 0 | Refills: 0 | DISCHARGE
Start: 2022-12-22

## 2022-12-22 RX ORDER — CIPROFLOXACIN LACTATE 400MG/40ML
500 VIAL (ML) INTRAVENOUS ONCE
Refills: 0 | Status: COMPLETED | OUTPATIENT
Start: 2022-12-22 | End: 2022-12-22

## 2022-12-22 RX ORDER — SENNA PLUS 8.6 MG/1
1 TABLET ORAL
Refills: 0 | DISCHARGE
Start: 2022-12-22

## 2022-12-22 RX ADMIN — LIDOCAINE 1 PATCH: 4 CREAM TOPICAL at 01:38

## 2022-12-22 RX ADMIN — Medication 1 SPRAY(S): at 08:22

## 2022-12-22 RX ADMIN — LIDOCAINE 1 PATCH: 4 CREAM TOPICAL at 12:13

## 2022-12-22 RX ADMIN — PANTOPRAZOLE SODIUM 40 MILLIGRAM(S): 20 TABLET, DELAYED RELEASE ORAL at 06:24

## 2022-12-22 RX ADMIN — HEPARIN SODIUM 5000 UNIT(S): 5000 INJECTION INTRAVENOUS; SUBCUTANEOUS at 06:24

## 2022-12-22 RX ADMIN — ATENOLOL 25 MILLIGRAM(S): 25 TABLET ORAL at 07:33

## 2022-12-22 RX ADMIN — SODIUM CHLORIDE 75 MILLILITER(S): 9 INJECTION INTRAMUSCULAR; INTRAVENOUS; SUBCUTANEOUS at 09:40

## 2022-12-22 RX ADMIN — URSODIOL 300 MILLIGRAM(S): 250 TABLET, FILM COATED ORAL at 06:25

## 2022-12-22 RX ADMIN — Medication 500 MILLIGRAM(S): at 14:05

## 2022-12-22 RX ADMIN — POLYETHYLENE GLYCOL 3350 17 GRAM(S): 17 POWDER, FOR SOLUTION ORAL at 12:13

## 2022-12-22 RX ADMIN — LOSARTAN POTASSIUM 100 MILLIGRAM(S): 100 TABLET, FILM COATED ORAL at 06:25

## 2022-12-22 RX ADMIN — AMLODIPINE BESYLATE 10 MILLIGRAM(S): 2.5 TABLET ORAL at 06:25

## 2022-12-22 RX ADMIN — Medication 1 SPRAY(S): at 13:14

## 2022-12-22 NOTE — PROGRESS NOTE ADULT - PROBLEM SELECTOR PLAN 2
-Had similar presentation Jan 2022 for which she was treated for UTi w/resolution of symptoms  -UA borderline, however given urinary symptoms and positive growth suspect likely source of infection  - CT abdomen and pelvis without evidence of intraabdominal infection   - abx as above
-Had similar presentation Jan 2022 for which she was treated for UTi w/resolution of symptoms  -UA negative however given symptoms and history will treat for acute UTi pending urine culture results  -concern for pyelonephritis given left CVA tenderness, c/w ceftriaxone and f/U Ct abdomen and pelvis
- Had similar presentation Jan 2022 for which she was treated for UTI w/resolution of symptoms  - UA borderline, however given urinary symptoms and positive growth suspect likely source of infection  - CT abdomen and pelvis without evidence of intraabdominal infection   - abx as above
-Had similar presentation Jan 2022 for which she was treated for UTi w/resolution of symptoms  -UA negative however given symptoms and history will treat for acute UTi pending urine culture results  -concern for pyelonephritis given left CVA tenderness, c/w Zosyn and f/U Ct abdomen and pelvis
- Had similar presentation Jan 2022 for which she was treated for UTI w/resolution of symptoms  - UA borderline, however given urinary symptoms and positive growth suspect likely source of infection  - CT abdomen and pelvis without evidence of intraabdominal infection   - abx as above
-Had similar presentation Jan 2022 for which she was treated for UTI w/resolution of symptoms  -UA borderline, however given urinary symptoms and positive growth suspect likely source of infection  - CT abdomen and pelvis without evidence of intraabdominal infection   - abx as above

## 2022-12-22 NOTE — PROGRESS NOTE ADULT - PROBLEM SELECTOR PROBLEM 6
Transaminitis
Cholelithiasis without cholecystitis
Transaminitis
Transaminitis

## 2022-12-22 NOTE — PROGRESS NOTE ADULT - PROBLEM SELECTOR PLAN 10
-Takes Lunesta 3mg at bedtime at home  -will not continue inpatient  -melatonin for insomnia
- BP elevated   - continue home atenalol, ARB  - increased amlodipine to 10mg   - continue to titrate as needed
-Takes Lunesta 3mg at bedtime at home  -will not continue inpatient  -melatonin for insomnia

## 2022-12-22 NOTE — PROGRESS NOTE ADULT - PROBLEM SELECTOR PROBLEM 5
Cholelithiasis without cholecystitis
Hyponatremia

## 2022-12-22 NOTE — PROGRESS NOTE ADULT - PROBLEM SELECTOR PLAN 7
-Questionable fall at home,  unwitnessed  -CTH showed aged related changed  - TTE showed preserved LVEF with calcified aortic valve with mild-moderate AR, normal opening.   - Fall precautions  - PT eval -> [facility]LO
-Questionable fall at home,  unwitnessed  -CTH showed aged related changed  [ ] echo   - Fall precautions  - PT eval -> [facility]LO
-Questionable fall at home,  unwitnessed  -CTH pending  -Fall precautions  -Orthostatic BP-----  -PT ahmet
-Questionable fall at home,  unwitnessed  -CTH showed aged related changed  [ ] Orthostatic BP, echo   - Fall precautions  - PT eval -> CHELO
-Questionable fall at home,  unwitnessed  -CTH pending  -Fall precautions  -Orthostatic BP-----  -PT ahmet
-elevated LFTS, was present on previous admission as well  -no abdominal pain  -likely due to underlying infection, CTM

## 2022-12-22 NOTE — PROGRESS NOTE ADULT - PROBLEM SELECTOR PLAN 8
-Takes tylenol as needed at home (has been taking approximately every other day as needed)  -Continue with tylenol
-Takes tylenol as needed at home (has been taking approximately every other day as needed)  -Continue with tylenol
-Questionable fall at home,  unwitnessed  -CTH showed aged related changed  - TTE showed preserved LVEF with calcified aortic valve with mild-moderate AR, normal opening.   - Fall precautions  - PT eval -> [facility]LO
-Takes tylenol as needed at home (has been taking approximately every other day as needed)  -Continue with tylenol

## 2022-12-22 NOTE — PROGRESS NOTE ADULT - NSPROGADDITIONALINFOA_GEN_ALL_CORE
updated patient's son today.
Called patient son Irvin, went to . Updated patient's daughter today on 12/21, she states that she will update Edward.

## 2022-12-22 NOTE — PROGRESS NOTE ADULT - PROBLEM SELECTOR PLAN 1
-Febrile T max 100.3 in ED w/leukocytosis & tachycardia   -RVP negative  -received zosyn in ED  -Will continue ceftriaxone  -Lactate WNL
- Sepsis POA. Presented Febrile T max 100.3 in ED w/leukocytosis & tachycardia. Sepsis now resolved   - likely secondary to UTI  - US abdomen - showed billary sludge, equivocal for cholecystitis   - CT abdomen - diverticulosis without diverticulitis, otherwise negative for infection in the abdomen or pelvis.  - BCx 12/15 - E. coli in 4/4 bottles. Repeat culture 12/17 NGTD   - UCx showed polymicrobial growth   - c/w ceftriaxone, will transition to oral cipro on discharge for total 10 day course, 12/16-12/25
- Sepsis POA. Presented Febrile T max 100.3 in ED w/leukocytosis & tachycardia. Sepsis now resolved   - likely secondary to UTI  - US abdomen - showed billary sludge, equivocal for cholecystitis   - CT abdomen - diverticulosis without diverticulitis, otherwise negative for infection in the abdomen or pelvis.  - BCx 12/15 - E. coli in 4/4 bottles. Repeat culture 12/17 NGTD   - UCx showed polymicrobial growth   - c/w ceftriaxone, will transition to oral cipro 500 BID on discharge for total 10 day course, 12/16-12/25
- Sepsis POA. Presented Febrile T max 100.3 in ED w/leukocytosis & tachycardia. Sepsis now resolved   - likely secondary to UTI  - US abdomen - showed billary sludge, equivocal for cholecystitis   - CT abdomen - diverticulosis without diverticulitis, otherwise negative for infection in the abdomen or pelvis.  - BCx 12/15 - E. coli in 4/4 bottles. Repeat culture 12/17 NGTD   - UCx showed polymicrobial growth   - c/w ceftriaxone, will transition to oral cipro 500 BID on discharge for total 10 day course, 12/16-12/25
- Sepsis POA. Presented Febrile T max 100.3 in ED w/leukocytosis & tachycardia. Sepsis now resolved   - likely secondary to UTI, vs less likely GI/hepatobilliary source   - US abdomen - showed billary sludge, equivocal for cholecystitis   - CT abdomen - diverticulosis without diverticulitis, otherwise negative for infection in the abdomen or pelvis.  - BCx 12/15 - E. coli in 4/4 bottles. Repeat culture 12/17 NGTD   - UCx showed polymicrobial growth   - c/w ceftriaxone, can transition to oral on discharge if repeat cultures remain negative
-Febrile T max 100.3 in ED w/leukocytosis & tachycardia   -RVP negative  -received zosyn in ED  -Will continue Zosyn pending blood and urine cultures  -Lactate WNL

## 2022-12-22 NOTE — PROGRESS NOTE ADULT - PROBLEM SELECTOR PROBLEM 1
Sepsis due to Escherichia coli
Sepsis due to Escherichia coli
SIRS (systemic inflammatory response syndrome)
Sepsis due to Escherichia coli
Sepsis due to Escherichia coli
SIRS (systemic inflammatory response syndrome)

## 2022-12-22 NOTE — PROGRESS NOTE ADULT - PROBLEM SELECTOR PLAN 9
- BP elevated   -Continue home atenalol and ARB  -started on 5mg of amlodipine  - continue to titrate as needed
- BP elevated   - continue home atenalol and ARB  - increased amlodipine to 10mg   - continue to titrate as needed
-Takes tylenol as needed at home (has been taking approximately every other day as needed)  -Continue with tylenol
-Continue home regimen w/hold parameters  -DASH diet

## 2022-12-22 NOTE — PROGRESS NOTE ADULT - PROBLEM SELECTOR PLAN 5
-RUQ US performed with concern for acute cholecystitis  -Patient was seen by surgery given no abdominal pain & negative mock sign acute handy unlikely   -No surgical intervention required  -Continue to monitor for abdominal pain and oral intake intolerance, reconsult surgery if continued concerns
-RUQ US performed with concern for acute cholecystitis  -Patient was seen by surgery given no abdominal pain & negative mock sign acute handy unlikely   -No surgical intervention required  -Continue to monitor for abdominal pain and oral intake intolerance, reconsult surgery if continued concerns
-RUQ US performed with concern for acute cholecystitis  -Patient was seen by surgery given no abdominal pain & negative Solano sign acute handy unlikely   -No surgical intervention required  -Continue to monitor for abdominal pain and oral intake intolerance, reconsult surgery if continued concerns
- secondary to hypovolemia in setting of decreased PO intake  - resolved with IVF  - continue to monitor
-RUQ US performed with concern for acute cholecystitis  -Patient was seen by surgery given no abdominal pain & negative Solano sign acute handy unlikely   -No surgical intervention required  -Continue to monitor for abdominal pain and oral intake intolerance, reconsult surgery if continued concerns
-RUQ US performed with concern for acute cholecystitis  -Patient was seen by surgery given no abdominal pain & negative Solano sign acute handy unlikely   -No surgical intervention required  -Continue to monitor for abdominal pain and oral intake intolerance, reconsult surgery if continued concerns

## 2022-12-22 NOTE — PROGRESS NOTE ADULT - PROBLEM SELECTOR PLAN 11
DVTppx: Heparin SQ    Dispo: to [facility], d/w [facility]    Communication: spoke with patient's son Irvin [phone] and updated him regarding POC. Agreeable for [facility] and eventual assisted living.
-Heparin SQ
-Takes Lunesta 3mg at bedtime at home  -will not continue inpatient  -melatonin for insomnia
DVTppx: Heparin SQ  Dispo: [facility] d/philomena [facility]
-Heparin SQ
DVTppx: Heparin SQ  Dispo: [facility] d/philomena [facility]

## 2022-12-22 NOTE — PROGRESS NOTE ADULT - NUTRITIONAL ASSESSMENT
This patient has been assessed with a concern for Malnutrition and has been determined to have a diagnosis/diagnoses of Moderate protein-calorie malnutrition.    This patient is being managed with:   Diet DASH/TLC-  Sodium & Cholesterol Restricted  Entered: Dec 16 2022 11:39AM    
This patient has been assessed with a concern for Malnutrition and has been determined to have a diagnosis/diagnoses of Moderate protein-calorie malnutrition.    This patient is being managed with:   Diet DASH/TLC-  Sodium & Cholesterol Restricted  Supplement Feeding Modality:  Oral  Ensure Plus High Protein Cans or Servings Per Day:  1       Frequency:  Two Times a day  Entered: Dec 18 2022 12:15PM    

## 2022-12-22 NOTE — PROGRESS NOTE ADULT - PROBLEM SELECTOR PROBLEM 2
Urinary tract infection, acute

## 2022-12-22 NOTE — PROGRESS NOTE ADULT - PROBLEM SELECTOR PLAN 4
- secondary to hypovolemia in setting of decreased PO intake  - resolved with IVF  - continue to monitor
-Labs notable for serum sodium of 130  -received 1L of LR in ED  -patient w/decreased PO intake last 3 days  -Urine Studies w/urine sodium >20  -Likely hypovolemic hyponatremia, pending labs for further eval  -IV hydration, repeat BMP @ 1700
# Metabolic encephalopathy in setting of sepsis  - resolved   - Patient paranoid, per son was seeing bugs on walls at home which were not physically present  - in Ed was confusing son Irvin with spouse (who passed approximately 15 years ago)  - continue with treatment of sepsis as above
- secondary to hypovolemia in setting of decreased PO intake  - resolved with IVF  - continue to monitor
-Labs notable for serum sodium of 130  -received 1L of LR in ED  -patient w/decreased PO intake last 3 days  -Urine Studies w/urine sodium >20  -Likely hypovolemic hyponatremia, pending labs for further eval  -IV hydration, repeat BMP @ 1700
- secondary to hypovolemia in setting of decreased PO intake  - resolved with IVF  - continue to monitor

## 2022-12-22 NOTE — PROGRESS NOTE ADULT - SUBJECTIVE AND OBJECTIVE BOX
Patient is a 89y old  Female who presents with a chief complaint of Weakness (21 Dec 2022 17:30)    Obed Potts MD   [facility] Medicine   Pager [phone]  Reachable on [employer] Teams     SUBJECTIVE / OVERNIGHT EVENTS:  Patient seen and examined this morning. States that she was slightly nauseated yesterday and found it difficult to drink. Feeling better today.  No fever chills, nausea or vomiting.     MEDICATIONS  (STANDING):  amLODIPine   Tablet 10 milliGRAM(s) Oral daily  atenolol  Tablet 25 milliGRAM(s) Oral <User Schedule>  atenolol  Tablet 50 milliGRAM(s) Oral at bedtime  ciprofloxacin     Tablet 500 milliGRAM(s) Oral once  heparin   Injectable 5000 Unit(s) SubCutaneous every 12 hours  lidocaine   4% Patch 1 Patch Transdermal daily  lidocaine   4% Patch 1 Patch Transdermal daily  melatonin 6 milliGRAM(s) Oral at bedtime  oxybutynin XL 5 milliGRAM(s) Oral at bedtime  pantoprazole    Tablet 40 milliGRAM(s) Oral before breakfast  polyethylene glycol 3350 17 Gram(s) Oral daily  senna 2 Tablet(s) Oral at bedtime  sodium chloride 0.9%. 1000 milliLiter(s) (75 mL/Hr) IV Continuous <Continuous>  ursodiol Capsule 300 milliGRAM(s) Oral every 12 hours    MEDICATIONS  (PRN):  acetaminophen     Tablet .. 650 milliGRAM(s) Oral every 8 hours PRN Temp greater or equal to 38C (100.4F), Mild Pain (1 - 3), Moderate Pain (4 - 6)  sodium chloride 0.65% Nasal 1 Spray(s) Both Nostrils every 2 hours PRN Nasal Congestion      Vital Signs Last 24 Hrs  T(C): 36.9 (22 Dec 2022 06:35), Max: 36.9 (22 Dec 2022 02:18)  T(F): 98.4 (22 Dec 2022 06:35), Max: 98.5 (22 Dec 2022 02:18)  HR: 71 (22 Dec 2022 07:30) (70 - 99)  BP: 173/52 (22 Dec 2022 07:30) (132/65 - 178/61)  BP(mean): --  RR: 18 (22 Dec 2022 07:30) (18 - 18)  SpO2: 94% (22 Dec 2022 07:30) (93% - 98%)  CAPILLARY BLOOD GLUCOSE        I&O's Summary      General: elderly woman, NAD, awake and alert  Neck: Supple, trachea midline   Respiratory: No respiratory distress, CTABL, No rales, rhonchi, wheezing.  Cardiovascular: S1,S2; Regular rate and rhythm; No m/g/r.   Gastrointestinal: Soft, Nontender, Nondistended; +BS.   Extremities: No c/c/e; warm to touch  Skin: No rashes, No erythema   Psych: AAOx3, appropriate mood and affect    LABS:                        10.1   13.00 )-----------( 434      ( 22 Dec 2022 06:30 )             31.6     12-22    138  |  102  |  38<H>  ----------------------------<  110<H>  5.1   |  22  |  1.49<H>    Ca    9.5      22 Dec 2022 06:30    TPro  6.3  /  Alb  3.6  /  TBili  0.3  /  DBili  x   /  AST  23  /  ALT  31  /  AlkPhos  97  12-22              RADIOLOGY & ADDITIONAL TESTS:    Imaging Personally Reviewed:    Consultant(s) Notes Reviewed:      Care Discussed with Consultants/Other Providers:

## 2022-12-22 NOTE — DISCHARGE NOTE NURSING/CASE MANAGEMENT/SOCIAL WORK - NSDPDISTO_GEN_ALL_CORE
Pt. is afebrile and offers no complaints. In no acute distress. Patient ambulates with assistance, voiding in adequate amounts. Encourage po intake and hydration. + BM on 12/19/22/Sub-Acute rehab

## 2022-12-22 NOTE — DISCHARGE NOTE NURSING/CASE MANAGEMENT/SOCIAL WORK - PATIENT PORTAL LINK FT
You can access the FollowMyHealth Patient Portal offered by [facility] by registering at the following website: [email or web address]. By joining [employer]’s FollowMyHealth portal, you will also be able to view your health information using other applications (apps) compatible with our system.

## 2022-12-22 NOTE — PROGRESS NOTE ADULT - PROBLEM SELECTOR PLAN 6
-elevated LFTS, was present on previous admission as well  -no abdominal pain  -likely due to underlying infection, CTM
-elevated LFTS, was present on previous admission as well  -no abdominal pain  -likely due to underlying infection, will trend and if no improvement consider LUQ abominal US (was performed Jan 2022 w/hepatic cysts noted)  -CT abdomen and pelvis given inconsistency in physical abdominal exams  -T-bili elevated
-RUQ US performed with concern for acute cholecystitis  -Patient was seen by surgery given no abdominal pain & negative Solano sign acute handy unlikely   -No surgical intervention required  -Continue to monitor for abdominal pain and oral intake intolerance, reconsult surgery if continued concerns
-elevated LFTS, was present on previous admission as well  -no abdominal pain  -likely due to underlying infection, will trend and if no improvement consider LUQ abominal US (was performed Jan 2022 w/hepatic cysts noted)  -CT abdomen and pelvis given inconsistency in physical abdominal exams  -T-bili elevated
-elevated LFTS, was present on previous admission as well  -no abdominal pain  -likely due to underlying infection, CTM
-elevated LFTS, was present on previous admission as well  -no abdominal pain  -likely due to underlying infection, CTM

## 2022-12-22 NOTE — PROGRESS NOTE ADULT - PROBLEM SELECTOR PLAN 3
# Metabolic encephalopathy in setting of sepsis  -Patient paranoid, per son was seeing bugs on walls at home which were not physically present  -in Ed was confusing son Irvin with spouse (who passed approximately 15 years ago)  - continue with treatment of sepsis as above
-Patient paranoid, per son was seeing bugs on walls at home which were not physically present  -in Ed was confusing son Irvin with spouse (who passed approximately 15 years ago)  -Patient hesitant to answer orientation questions and states "I have all my marbles"  -Of note patient may have underlying fears of losing independence or being psychiatrically hospitalized which leads to her hesitancy-her spouse had Parkinsons with resulting dementia requiring inpatient care @ [facility]  -will rule out organic causes & treat for UTi, f/u urine culture, CTH pending, repeat BMP pending  -TSH, B12, Folate in AM  --If no improvement will consider psychiatric evaluation
-Patient paranoid, per son was seeing bugs on walls at home which were not physically present  -in Ed was confusing son Irvin with spouse (who passed approximately 15 years ago)  -Patient hesitant to answer orientation questions and states "I have all my marbles"  -Of note patient may have underlying fears of losing independence or being psychiatrically hospitalized which leads to her hesitancy-her spouse had Parkinsons with resulting dementia requiring inpatient care @ [facility]  -will rule out organic causes & treat for UTi, f/u urine culture, CTH pending, repeat BMP pending  -TSH, B12, Folate in AM  --If no improvement will consider psychiatric evaluation
# Metabolic encephalopathy in setting of sepsis  - resolved   - Patient paranoid, per son was seeing bugs on walls at home which were not physically present  - in Ed was confusing son Irvin with spouse (who passed approximately 15 years ago)  - continue with treatment of sepsis as above
Slight increase in Cr today due to resuming ARB and decreased PO intake   - s/p 500 cc IVF today, PO intake improved today  - recheck BMP in [facility] in 48h and consider d/c ARB if fails to improve
# Metabolic encephalopathy in setting of sepsis  -Patient paranoid, per son was seeing bugs on walls at home which were not physically present  -in Ed was confusing son Irvin with spouse (who passed approximately 15 years ago)  - continue with treatment of sepsis as above

## 2022-12-23 RX ORDER — IRBESARTAN 75 MG/1
1 TABLET ORAL
Qty: 0 | Refills: 0 | DISCHARGE
Start: 2022-12-23

## 2023-01-31 ENCOUNTER — NON-APPOINTMENT (OUTPATIENT)
Age: 88
End: 2023-01-31

## 2023-01-31 ENCOUNTER — APPOINTMENT (OUTPATIENT)
Dept: OPHTHALMOLOGY | Facility: CLINIC | Age: 88
End: 2023-01-31
Payer: MEDICARE

## 2023-01-31 PROCEDURE — 92015 DETERMINE REFRACTIVE STATE: CPT

## 2023-01-31 PROCEDURE — 92004 COMPRE OPH EXAM NEW PT 1/>: CPT

## 2023-03-23 ENCOUNTER — TRANSCRIPTION ENCOUNTER (OUTPATIENT)
Age: 88
End: 2023-03-23

## 2023-06-09 ENCOUNTER — INPATIENT (INPATIENT)
Facility: HOSPITAL | Age: 88
LOS: 3 days | Discharge: NURSING, REHAB OR LONG-TERM CARE | End: 2023-06-13
Attending: STUDENT IN AN ORGANIZED HEALTH CARE EDUCATION/TRAINING PROGRAM | Admitting: STUDENT IN AN ORGANIZED HEALTH CARE EDUCATION/TRAINING PROGRAM
Payer: MEDICARE

## 2023-06-09 VITALS
DIASTOLIC BLOOD PRESSURE: 71 MMHG | RESPIRATION RATE: 16 BRPM | HEART RATE: 93 BPM | TEMPERATURE: 98 F | OXYGEN SATURATION: 99 % | SYSTOLIC BLOOD PRESSURE: 155 MMHG

## 2023-06-09 DIAGNOSIS — Z90.89 ACQUIRED ABSENCE OF OTHER ORGANS: Chronic | ICD-10-CM

## 2023-06-09 DIAGNOSIS — Z29.9 ENCOUNTER FOR PROPHYLACTIC MEASURES, UNSPECIFIED: ICD-10-CM

## 2023-06-09 DIAGNOSIS — N32.81 OVERACTIVE BLADDER: ICD-10-CM

## 2023-06-09 DIAGNOSIS — L03.115 CELLULITIS OF RIGHT LOWER LIMB: ICD-10-CM

## 2023-06-09 DIAGNOSIS — Z98.890 OTHER SPECIFIED POSTPROCEDURAL STATES: Chronic | ICD-10-CM

## 2023-06-09 DIAGNOSIS — R19.7 DIARRHEA, UNSPECIFIED: ICD-10-CM

## 2023-06-09 DIAGNOSIS — N17.9 ACUTE KIDNEY FAILURE, UNSPECIFIED: ICD-10-CM

## 2023-06-09 DIAGNOSIS — I10 ESSENTIAL (PRIMARY) HYPERTENSION: ICD-10-CM

## 2023-06-09 DIAGNOSIS — L03.90 CELLULITIS, UNSPECIFIED: ICD-10-CM

## 2023-06-09 LAB
ALBUMIN SERPL ELPH-MCNC: 3.8 G/DL — SIGNIFICANT CHANGE UP (ref 3.3–5)
ALP SERPL-CCNC: 82 U/L — SIGNIFICANT CHANGE UP (ref 40–120)
ALT FLD-CCNC: 11 U/L — SIGNIFICANT CHANGE UP (ref 4–33)
ANION GAP SERPL CALC-SCNC: 13 MMOL/L — SIGNIFICANT CHANGE UP (ref 7–14)
AST SERPL-CCNC: 14 U/L — SIGNIFICANT CHANGE UP (ref 4–32)
BASOPHILS # BLD AUTO: 0.02 K/UL — SIGNIFICANT CHANGE UP (ref 0–0.2)
BASOPHILS NFR BLD AUTO: 0.2 % — SIGNIFICANT CHANGE UP (ref 0–2)
BILIRUB SERPL-MCNC: 0.3 MG/DL — SIGNIFICANT CHANGE UP (ref 0.2–1.2)
BUN SERPL-MCNC: 26 MG/DL — HIGH (ref 7–23)
CALCIUM SERPL-MCNC: 9.4 MG/DL — SIGNIFICANT CHANGE UP (ref 8.4–10.5)
CHLORIDE SERPL-SCNC: 106 MMOL/L — SIGNIFICANT CHANGE UP (ref 98–107)
CO2 SERPL-SCNC: 19 MMOL/L — LOW (ref 22–31)
CREAT SERPL-MCNC: 1.56 MG/DL — HIGH (ref 0.5–1.3)
EGFR: 30 ML/MIN/1.73M2 — LOW
EOSINOPHIL # BLD AUTO: 0.03 K/UL — SIGNIFICANT CHANGE UP (ref 0–0.5)
EOSINOPHIL NFR BLD AUTO: 0.3 % — SIGNIFICANT CHANGE UP (ref 0–6)
GLUCOSE SERPL-MCNC: 117 MG/DL — HIGH (ref 70–99)
HCT VFR BLD CALC: 28.4 % — LOW (ref 34.5–45)
HGB BLD-MCNC: 9.1 G/DL — LOW (ref 11.5–15.5)
IANC: 7.77 K/UL — HIGH (ref 1.8–7.4)
IMM GRANULOCYTES NFR BLD AUTO: 1 % — HIGH (ref 0–0.9)
LIDOCAIN IGE QN: 32 U/L — SIGNIFICANT CHANGE UP (ref 7–60)
LYMPHOCYTES # BLD AUTO: 1.13 K/UL — SIGNIFICANT CHANGE UP (ref 1–3.3)
LYMPHOCYTES # BLD AUTO: 11.6 % — LOW (ref 13–44)
MCHC RBC-ENTMCNC: 29.9 PG — SIGNIFICANT CHANGE UP (ref 27–34)
MCHC RBC-ENTMCNC: 32 GM/DL — SIGNIFICANT CHANGE UP (ref 32–36)
MCV RBC AUTO: 93.4 FL — SIGNIFICANT CHANGE UP (ref 80–100)
MONOCYTES # BLD AUTO: 0.67 K/UL — SIGNIFICANT CHANGE UP (ref 0–0.9)
MONOCYTES NFR BLD AUTO: 6.9 % — SIGNIFICANT CHANGE UP (ref 2–14)
NEUTROPHILS # BLD AUTO: 7.77 K/UL — HIGH (ref 1.8–7.4)
NEUTROPHILS NFR BLD AUTO: 80 % — HIGH (ref 43–77)
NRBC # BLD: 0 /100 WBCS — SIGNIFICANT CHANGE UP (ref 0–0)
NRBC # FLD: 0 K/UL — SIGNIFICANT CHANGE UP (ref 0–0)
PLATELET # BLD AUTO: 538 K/UL — HIGH (ref 150–400)
POTASSIUM SERPL-MCNC: 4.9 MMOL/L — SIGNIFICANT CHANGE UP (ref 3.5–5.3)
POTASSIUM SERPL-SCNC: 4.9 MMOL/L — SIGNIFICANT CHANGE UP (ref 3.5–5.3)
PROT SERPL-MCNC: 6.7 G/DL — SIGNIFICANT CHANGE UP (ref 6–8.3)
RBC # BLD: 3.04 M/UL — LOW (ref 3.8–5.2)
RBC # FLD: 11.9 % — SIGNIFICANT CHANGE UP (ref 10.3–14.5)
SODIUM SERPL-SCNC: 138 MMOL/L — SIGNIFICANT CHANGE UP (ref 135–145)
WBC # BLD: 9.72 K/UL — SIGNIFICANT CHANGE UP (ref 3.8–10.5)
WBC # FLD AUTO: 9.72 K/UL — SIGNIFICANT CHANGE UP (ref 3.8–10.5)

## 2023-06-09 PROCEDURE — 99285 EMERGENCY DEPT VISIT HI MDM: CPT | Mod: FS

## 2023-06-09 PROCEDURE — 99223 1ST HOSP IP/OBS HIGH 75: CPT

## 2023-06-09 PROCEDURE — 73590 X-RAY EXAM OF LOWER LEG: CPT | Mod: 26,RT

## 2023-06-09 RX ORDER — SODIUM CHLORIDE 9 MG/ML
1000 INJECTION INTRAMUSCULAR; INTRAVENOUS; SUBCUTANEOUS ONCE
Refills: 0 | Status: COMPLETED | OUTPATIENT
Start: 2023-06-09 | End: 2023-06-09

## 2023-06-09 RX ORDER — ATENOLOL 25 MG/1
50 TABLET ORAL
Refills: 0 | Status: DISCONTINUED | OUTPATIENT
Start: 2023-06-09 | End: 2023-06-13

## 2023-06-09 RX ORDER — CHOLECALCIFEROL (VITAMIN D3) 125 MCG
1000 CAPSULE ORAL DAILY
Refills: 0 | Status: DISCONTINUED | OUTPATIENT
Start: 2023-06-09 | End: 2023-06-09

## 2023-06-09 RX ORDER — LIDOCAINE 4 G/100G
1 CREAM TOPICAL EVERY 24 HOURS
Refills: 0 | Status: DISCONTINUED | OUTPATIENT
Start: 2023-06-09 | End: 2023-06-13

## 2023-06-09 RX ORDER — PREGABALIN 225 MG/1
1000 CAPSULE ORAL DAILY
Refills: 0 | Status: DISCONTINUED | OUTPATIENT
Start: 2023-06-09 | End: 2023-06-09

## 2023-06-09 RX ORDER — HEPARIN SODIUM 5000 [USP'U]/ML
5000 INJECTION INTRAVENOUS; SUBCUTANEOUS EVERY 12 HOURS
Refills: 0 | Status: DISCONTINUED | OUTPATIENT
Start: 2023-06-09 | End: 2023-06-13

## 2023-06-09 RX ORDER — ATENOLOL 25 MG/1
2 TABLET ORAL
Qty: 0 | Refills: 0 | DISCHARGE

## 2023-06-09 RX ORDER — ONDANSETRON 8 MG/1
4 TABLET, FILM COATED ORAL ONCE
Refills: 0 | Status: COMPLETED | OUTPATIENT
Start: 2023-06-09 | End: 2023-06-09

## 2023-06-09 RX ORDER — ATENOLOL 25 MG/1
1 TABLET ORAL
Qty: 0 | Refills: 0 | DISCHARGE

## 2023-06-09 RX ORDER — PREGABALIN 225 MG/1
1 CAPSULE ORAL
Qty: 0 | Refills: 0 | DISCHARGE

## 2023-06-09 RX ORDER — ONDANSETRON 8 MG/1
4 TABLET, FILM COATED ORAL EVERY 6 HOURS
Refills: 0 | Status: DISCONTINUED | OUTPATIENT
Start: 2023-06-09 | End: 2023-06-13

## 2023-06-09 RX ORDER — PANTOPRAZOLE SODIUM 20 MG/1
40 TABLET, DELAYED RELEASE ORAL
Refills: 0 | Status: DISCONTINUED | OUTPATIENT
Start: 2023-06-09 | End: 2023-06-13

## 2023-06-09 RX ORDER — CHLORHEXIDINE GLUCONATE 213 G/1000ML
1 SOLUTION TOPICAL DAILY
Refills: 0 | Status: DISCONTINUED | OUTPATIENT
Start: 2023-06-09 | End: 2023-06-13

## 2023-06-09 RX ORDER — ACETAMINOPHEN 500 MG
650 TABLET ORAL EVERY 6 HOURS
Refills: 0 | Status: DISCONTINUED | OUTPATIENT
Start: 2023-06-09 | End: 2023-06-13

## 2023-06-09 RX ORDER — SODIUM CHLORIDE 9 MG/ML
1000 INJECTION, SOLUTION INTRAVENOUS
Refills: 0 | Status: DISCONTINUED | OUTPATIENT
Start: 2023-06-09 | End: 2023-06-11

## 2023-06-09 RX ORDER — ACETAMINOPHEN 500 MG
1000 TABLET ORAL ONCE
Refills: 0 | Status: COMPLETED | OUTPATIENT
Start: 2023-06-09 | End: 2023-06-09

## 2023-06-09 RX ORDER — ZOLPIDEM TARTRATE 10 MG/1
5 TABLET ORAL AT BEDTIME
Refills: 0 | Status: DISCONTINUED | OUTPATIENT
Start: 2023-06-09 | End: 2023-06-13

## 2023-06-09 RX ORDER — OXYBUTYNIN CHLORIDE 5 MG
5 TABLET ORAL DAILY
Refills: 0 | Status: DISCONTINUED | OUTPATIENT
Start: 2023-06-09 | End: 2023-06-13

## 2023-06-09 RX ADMIN — Medication 650 MILLIGRAM(S): at 21:28

## 2023-06-09 RX ADMIN — SODIUM CHLORIDE 1000 MILLILITER(S): 9 INJECTION INTRAMUSCULAR; INTRAVENOUS; SUBCUTANEOUS at 12:10

## 2023-06-09 RX ADMIN — Medication 600 MILLIGRAM(S): at 15:14

## 2023-06-09 RX ADMIN — Medication 400 MILLIGRAM(S): at 13:22

## 2023-06-09 RX ADMIN — Medication 100 MILLIGRAM(S): at 13:22

## 2023-06-09 RX ADMIN — SODIUM CHLORIDE 70 MILLILITER(S): 9 INJECTION, SOLUTION INTRAVENOUS at 16:58

## 2023-06-09 RX ADMIN — ZOLPIDEM TARTRATE 5 MILLIGRAM(S): 10 TABLET ORAL at 23:14

## 2023-06-09 RX ADMIN — Medication 100 MILLIGRAM(S): at 22:17

## 2023-06-09 RX ADMIN — ONDANSETRON 4 MILLIGRAM(S): 8 TABLET, FILM COATED ORAL at 13:22

## 2023-06-09 RX ADMIN — Medication 1000 MILLIGRAM(S): at 15:14

## 2023-06-09 RX ADMIN — Medication 5 MILLIGRAM(S): at 22:53

## 2023-06-09 RX ADMIN — Medication 650 MILLIGRAM(S): at 22:20

## 2023-06-09 RX ADMIN — ATENOLOL 50 MILLIGRAM(S): 25 TABLET ORAL at 18:38

## 2023-06-09 RX ADMIN — HEPARIN SODIUM 5000 UNIT(S): 5000 INJECTION INTRAVENOUS; SUBCUTANEOUS at 18:38

## 2023-06-09 NOTE — H&P ADULT - NSHPLABSRESULTS_GEN_ALL_CORE
9.1    9.72  )-----------( 538      ( 09 Jun 2023 12:15 )             28.4       06-09    138  |  106  |  26<H>  ----------------------------<  117<H>  4.9   |  19<L>  |  1.56<H>    Ca    9.4      09 Jun 2023 12:15    TPro  6.7  /  Alb  3.8  /  TBili  0.3  /  DBili  x   /  AST  14  /  ALT  11  /  AlkPhos  82  06-09                            CAPILLARY BLOOD GLUCOSE                  RADIOLOGY & ADDITIONAL TESTS:    Imaging personally reviewed.    Consultant(s) notes reviewed.    Care discussed with consultants and other providers.

## 2023-06-09 NOTE — ED ADULT NURSE NOTE - OBJECTIVE STATEMENT
pt to spot 28. alert, oriented x3. states lives in assisted living- ambulates with walker. states injury to r leg from falling 5/30. reports area painful,red and " maybe infected"  states started on keflex 2 days ago. awaiting md evaluation.  states loose stools this am with vomiting. will continue to monitor

## 2023-06-09 NOTE — H&P ADULT - NSHPPHYSICALEXAM_GEN_ALL_CORE
GENERAL: NAD  HEAD:  Atraumatic, Normocephalic  EYES: EOMI, conjunctiva and sclera clear  NECK: Supple  CHEST/LUNG: Clear to auscultation bilaterally; No wheeze, rhonchi, crackles or rales  HEART: Regular rate and rhythm; No murmurs, rubs, or gallops  ABDOMEN: Soft, Nontender, Nondistended; Bowel sounds present  EXTREMITIES:  2+ Peripheral Pulses, No edema  PSYCH: AAOx3  NEUROLOGY: non-focal  SKIN: +RLE wound w/ surrounding erythema (mid-shin to distal foot) and edema, no drainage

## 2023-06-09 NOTE — H&P ADULT - PROBLEM SELECTOR PLAN 3
-Elevated sCR noted. Baseline 1.49 in 12/2022  -Suspect in setting of decreased PO intake /N/V  -S/p IVF Bolus 1L  -C/w gentle hydration  -Obtain urine lytes  -Avoid nephrotoxins

## 2023-06-09 NOTE — H&P ADULT - NSHPSOCIALHISTORY_GEN_ALL_CORE
[facility] . Denies ETOH or tobacco use

## 2023-06-09 NOTE — ED ADULT NURSE REASSESSMENT NOTE - NS ED NURSE REASSESS COMMENT FT1
pt remains alert, oriented x3. pt with wound rle ,yellow exudate present with redness to area ,surrounding area rle noted. area cleaned, dsd applied. will continue to monitor ,await orders.

## 2023-06-09 NOTE — ED PROVIDER NOTE - OBJECTIVE STATEMENT
89 yo F with past medical history of HTN, HLD, arthritis presents to the ED for vomiting and diarrhea x 2 days. Patient states she fell 1.5 weeks ago and sustained a R lower leg wound, currently on Keflex starting 2 days ago. Reports vomiting and diarrhea began shortly after initiating antibiotics, but worsened this morning. Had 2 episodes of watery this morning without blood; last episode of vomiting this morning with yellow fluid, no blood. Denies recent travel history or consumption of raw/undercooked food. Denies fever/chills, nausea, abdominal pain, headache/dizziness, chest pain, palpitations.

## 2023-06-09 NOTE — H&P ADULT - ASSESSMENT
89 yo F with past medical history of HTN, HLD, arthritis presents to the ED for N/V , diarrhea and RLE wound admitted for RLE cellulitis

## 2023-06-09 NOTE — PATIENT PROFILE ADULT - FALL HARM RISK - HARM RISK INTERVENTIONS
Assistance with ambulation/Assistance OOB with selected safe patient handling equipment/Communicate Risk of Fall with Harm to all staff/Discuss with provider need for PT consult/Monitor gait and stability/Reinforce activity limits and safety measures with patient and family/Tailored Fall Risk Interventions/Visual Cue: Yellow wristband and red socks/Bed in lowest position, wheels locked, appropriate side rails in place/Call bell, personal items and telephone in reach/Instruct patient to call for assistance before getting out of bed or chair/Non-slip footwear when patient is out of bed/[place] to call system/Physically safe environment - no spills, clutter or unnecessary equipment/Purposeful Proactive Rounding/Room/bathroom lighting operational, light cord in reach

## 2023-06-09 NOTE — ED PROVIDER NOTE - PROGRESS NOTE DETAILS
FRED FERNÁNDEZ: labs reviewed, no wbc, elevated Cr. XR RLE no fx, no gas tracking. spoke with hospitalist, accepted pt for admission. Pt admitted for RLE cellulitis. DR. BERRIOS, ATTENDING MD-  I have reviewed and discussed the PA student’s documentation and findings with the student. After personally examining the patient, my findings have been added to this documentation.

## 2023-06-09 NOTE — H&P ADULT - PROBLEM SELECTOR PLAN 2
-Pt w. nausea/vomiting and diarrhea. Suspect in setting of recent abx  -Reasonable to r/o possible infection  -Obtain GI PCR, Stool culture, O/P, C-Diff  -Monitor stool count

## 2023-06-09 NOTE — H&P ADULT - NSHPREVIEWOFSYSTEMS_GEN_ALL_CORE
CONSTITUTIONAL:  No weight loss, fever, chills, weakness or fatigue.  HEENT:    No visual loss, blurred vision, No hearing loss, sneezing, congestion, runny nose or sore throat.  SKIN:  No rash or itching.  CARDIOVASCULAR:  No chest pain, chest pressure or chest discomfort. No palpitations.  RESPIRATORY:  No shortness of breath, cough or sputum.  GASTROINTESTINAL: + nausea, vomiting and diarrhea. No abdominal pain or blood.  GENITOURINARY:  Denies hematuria, dysuria.   NEUROLOGICAL:  No headache, dizziness, numbness or tingling in the extremities.  MUSCULOSKELETAL:  +back pain, no joint pain or stiffness.  HEMATOLOGIC:  No , bleeding or bruising.  ENDOCRINOLOGIC:  No reports of sweating, cold or heat intolerance. No polyuria or polydipsia.

## 2023-06-09 NOTE — H&P ADULT - HISTORY OF PRESENT ILLNESS
89 yo F with past medical history of HTN, HLD, arthritis presents to the ED for N/V , diarrhea and RLE wound. Pt states she tripped on her walker apx 2 weeks ago and fell, sustaining injury to RLower extremity. Apx 2 days later started to experience worsening pain, 'redness' and swelling in RLE as well as subjective fevers, which she reported to staff. States no interventions were recommended for days. Eventually recommended topical bacitracin which pt states was applied intermittently w.o sig improvement. Apx 2 days ago, prescribed keflex 500mg BID which she began and took two doses. Noted some nausea after first dose w/ worsening nausea, vomiting and diarrhea after second dose. Describes vomiting as NBNB. Diarrhea which pt states began last night is described as "watery". ROS otherwise negative. Denies current fevers/chills, chest pain, palpitations, SOB, abdominal pain, dysuria/hematuria, dizziness/light headedness. No recent travel or changes in diet.     Of note, pt states she was prescribed keflex years ago and "did not do too well." Denies fevers, N/V, rashes at that time but states she felt "off."    In the E.D, vitals stable. BMP w/ sCR 1.56. XRAY Tib/fib w/ LE edema. Pt given clindamycin, admitted to medicine for further mgt.

## 2023-06-09 NOTE — ED PROVIDER NOTE - CLINICAL SUMMARY MEDICAL DECISION MAKING FREE TEXT BOX
89 yo F with past medical history of HTN, HLD, arthritis presents to the ED for vomiting and diarrhea x 2 days beginning shortly after starting Keflex 2 days ago for R lower leg wound infection. Physical exam shows swelling 89 yo F with past medical history of HTN, HLD, arthritis presents to the ED for vomiting and diarrhea x 2 days beginning shortly after starting Keflex 2 days ago for R lower leg wound infection. Given swelling, tenderness, redness at the site, need to rule out osteomyelitis vs cellulitis. Vomiting and diarrhea differentials include antibiotic related vs infectious gastroenteritis. Will obtain tibia and fibula x ray, give IV fluids, draw labs.

## 2023-06-09 NOTE — ED PROVIDER NOTE - ATTENDING CONTRIBUTION TO CARE
DR. BERRIOS, ATTENDING MD-  I personally saw the patient with the PA and performed a substantive portion of the visit including all aspects of the medical decision making.    DR. BERRIOS, ATTENDING MD-  I have reviewed and discussed the medical student’s documentation and findings with the student. After personally examining the patient, my findings have been added to this documentation.    89 y/o female h/o hld htn oa bibems with n/v/d x2 days.  Started on keflex 2 days ago for rle cellulitis from non-healing leg wound.  Pt has redness and swelling to rle over foot extending to mid tib/fib region.  Will need iv abx for worsening cellulitis, unclear if n/v/d d/t drug reaction or viral syndrome.  Eval for lyte abn.  Obtain cbc cmp xr tib/fib give ivf bolus antiemetic pain med abx admit for further care and evaluation. DR. BERRIOS, ATTENDING MD-  I personally saw the patient with the PA and performed a substantive portion of the visit including all aspects of the medical decision making.    DR. BERRIOS, ATTENDING MD-  I have reviewed and discussed the PA student’s documentation and findings with the student. After personally examining the patient, my findings have been added to this documentation.    89 y/o female h/o hld htn oa bibems with n/v/d x2 days.  Started on keflex 2 days ago for rle cellulitis from non-healing leg wound.  Pt has redness and swelling to rle over foot extending to mid tib/fib region.  Will need iv abx for worsening cellulitis, unclear if n/v/d d/t drug reaction or viral syndrome.  Eval for lyte abn.  Obtain cbc cmp xr tib/fib give ivf bolus antiemetic pain med abx admit for further care and evaluation.

## 2023-06-09 NOTE — ED ADULT NURSE NOTE - NSFALLHARMRISKINTERV_ED_ALL_ED

## 2023-06-09 NOTE — ED ADULT TRIAGE NOTE - CHIEF COMPLAINT QUOTE
Pt brought in by EMS from home, complaining of vomiting since last night and diarrhea since this morning. Denies pain. Pt recently started taking antibiotics for a right lower leg infection.

## 2023-06-09 NOTE — ED PROVIDER NOTE - NS ED ATTENDING STATEMENT MOD
I have seen and examined this patient and fully participated in the care of this patient as the teaching attending.  The service was shared with the SIMEON.  I reviewed and verified the documentation and independently performed the documented:

## 2023-06-09 NOTE — H&P ADULT - PROBLEM SELECTOR PLAN 1
-2/2 RLE wound s/p mechanical fall  -X-Ray tib/fib w/ LE edema  -Resume Clindamycin given intolerance to keflex  -Wound care eval  -Monitor for fevers /leukocytosis  -Blood Cx x 2 if febrile

## 2023-06-09 NOTE — ED PROVIDER NOTE - PHYSICAL EXAMINATION
General: Elderly female lying in stretcher, no acute distress   HEENT: Normocephalic, atraumatic, PERRL, no neck tenderness, no erythema or lesions in oropharynx   Cardiac: +S1/S2, regular rate and rhythm   Lungs: Clear to auscultation bilaterally, non labored breathing   Abdomen: General: Elderly female lying in stretcher, no acute distress   HEENT: Normocephalic, atraumatic, PERRL, no neck tenderness, no erythema or lesions in oropharynx   Cardiac: +S1/S2, regular rate and rhythm   Lungs: Clear to auscultation bilaterally, non labored breathing   Abdomen: Soft non tender non distended  Extremities: +RLE edema, redness, warmth, +open wound with scab General: Elderly female lying in stretcher, no acute distress   HEENT: Normocephalic, atraumatic, PERRL, no neck tenderness, no erythema or lesions in oropharynx   Cardiac: +S1/S2, regular rate and rhythm   Lungs: Clear to auscultation bilaterally, non labored breathing   Abdomen: Soft non tender non distended  Extremities: +RLE edema, redness, warmth from mid shin to distal foot, +open wound not draining, +tenderness to area surrounding wound, no crepitus   MSK: Strength 5/5 in all extremities  Psych: Appropriate affect and behavior

## 2023-06-09 NOTE — PATIENT PROFILE ADULT - FUNCTIONAL ASSESSMENT - BASIC MOBILITY 6.
2-calculated by average/Not able to assess (calculate score using [facility] averaging method)

## 2023-06-10 DIAGNOSIS — D64.9 ANEMIA, UNSPECIFIED: ICD-10-CM

## 2023-06-10 LAB
24R-OH-CALCIDIOL SERPL-MCNC: 24.1 NG/ML — LOW (ref 30–80)
ALBUMIN SERPL ELPH-MCNC: 3.1 G/DL — LOW (ref 3.3–5)
ALP SERPL-CCNC: 62 U/L — SIGNIFICANT CHANGE UP (ref 40–120)
ALT FLD-CCNC: 7 U/L — SIGNIFICANT CHANGE UP (ref 4–33)
ANION GAP SERPL CALC-SCNC: 11 MMOL/L — SIGNIFICANT CHANGE UP (ref 7–14)
AST SERPL-CCNC: 12 U/L — SIGNIFICANT CHANGE UP (ref 4–32)
BILIRUB SERPL-MCNC: <0.2 MG/DL — SIGNIFICANT CHANGE UP (ref 0.2–1.2)
BUN SERPL-MCNC: 20 MG/DL — SIGNIFICANT CHANGE UP (ref 7–23)
CALCIUM SERPL-MCNC: 8.4 MG/DL — SIGNIFICANT CHANGE UP (ref 8.4–10.5)
CHLORIDE SERPL-SCNC: 109 MMOL/L — HIGH (ref 98–107)
CO2 SERPL-SCNC: 19 MMOL/L — LOW (ref 22–31)
CREAT ?TM UR-MCNC: 57 MG/DL — SIGNIFICANT CHANGE UP
CREAT SERPL-MCNC: 1.57 MG/DL — HIGH (ref 0.5–1.3)
EGFR: 30 ML/MIN/1.73M2 — LOW
FERRITIN SERPL-MCNC: 53 NG/ML — SIGNIFICANT CHANGE UP (ref 15–150)
GLUCOSE SERPL-MCNC: 100 MG/DL — HIGH (ref 70–99)
HCT VFR BLD CALC: 23 % — LOW (ref 34.5–45)
HCT VFR BLD CALC: 23.7 % — LOW (ref 34.5–45)
HGB BLD-MCNC: 7.3 G/DL — LOW (ref 11.5–15.5)
HGB BLD-MCNC: 7.5 G/DL — LOW (ref 11.5–15.5)
IRON SATN MFR SERPL: 15 UG/DL — LOW (ref 30–160)
IRON SATN MFR SERPL: 6 % — LOW (ref 14–50)
MAGNESIUM SERPL-MCNC: 1.5 MG/DL — LOW (ref 1.6–2.6)
MCHC RBC-ENTMCNC: 28.5 PG — SIGNIFICANT CHANGE UP (ref 27–34)
MCHC RBC-ENTMCNC: 29 PG — SIGNIFICANT CHANGE UP (ref 27–34)
MCHC RBC-ENTMCNC: 31.6 GM/DL — LOW (ref 32–36)
MCHC RBC-ENTMCNC: 31.7 GM/DL — LOW (ref 32–36)
MCV RBC AUTO: 90.1 FL — SIGNIFICANT CHANGE UP (ref 80–100)
MCV RBC AUTO: 91.3 FL — SIGNIFICANT CHANGE UP (ref 80–100)
MRSA PCR RESULT.: SIGNIFICANT CHANGE UP
NRBC # BLD: 0 /100 WBCS — SIGNIFICANT CHANGE UP (ref 0–0)
NRBC # BLD: 0 /100 WBCS — SIGNIFICANT CHANGE UP (ref 0–0)
NRBC # FLD: 0 K/UL — SIGNIFICANT CHANGE UP (ref 0–0)
NRBC # FLD: 0 K/UL — SIGNIFICANT CHANGE UP (ref 0–0)
PHOSPHATE SERPL-MCNC: 4.3 MG/DL — SIGNIFICANT CHANGE UP (ref 2.5–4.5)
PLATELET # BLD AUTO: 506 K/UL — HIGH (ref 150–400)
PLATELET # BLD AUTO: 531 K/UL — HIGH (ref 150–400)
POTASSIUM SERPL-MCNC: 4.6 MMOL/L — SIGNIFICANT CHANGE UP (ref 3.5–5.3)
POTASSIUM SERPL-SCNC: 4.6 MMOL/L — SIGNIFICANT CHANGE UP (ref 3.5–5.3)
PROT SERPL-MCNC: 5.5 G/DL — LOW (ref 6–8.3)
RBC # BLD: 2.52 M/UL — LOW (ref 3.8–5.2)
RBC # BLD: 2.63 M/UL — LOW (ref 3.8–5.2)
RBC # FLD: 11.9 % — SIGNIFICANT CHANGE UP (ref 10.3–14.5)
RBC # FLD: 12 % — SIGNIFICANT CHANGE UP (ref 10.3–14.5)
S AUREUS DNA NOSE QL NAA+PROBE: DETECTED
SODIUM SERPL-SCNC: 139 MMOL/L — SIGNIFICANT CHANGE UP (ref 135–145)
SODIUM UR-SCNC: 67 MMOL/L — SIGNIFICANT CHANGE UP
TIBC SERPL-MCNC: 254 UG/DL — SIGNIFICANT CHANGE UP (ref 220–430)
UIBC SERPL-MCNC: 239 UG/DL — SIGNIFICANT CHANGE UP (ref 110–370)
VIT B12 SERPL-MCNC: 1136 PG/ML — HIGH (ref 200–900)
WBC # BLD: 8.33 K/UL — SIGNIFICANT CHANGE UP (ref 3.8–10.5)
WBC # BLD: 9.06 K/UL — SIGNIFICANT CHANGE UP (ref 3.8–10.5)
WBC # FLD AUTO: 8.33 K/UL — SIGNIFICANT CHANGE UP (ref 3.8–10.5)
WBC # FLD AUTO: 9.06 K/UL — SIGNIFICANT CHANGE UP (ref 3.8–10.5)

## 2023-06-10 PROCEDURE — 99232 SBSQ HOSP IP/OBS MODERATE 35: CPT

## 2023-06-10 RX ORDER — MUPIROCIN 20 MG/G
1 OINTMENT TOPICAL
Refills: 0 | Status: DISCONTINUED | OUTPATIENT
Start: 2023-06-10 | End: 2023-06-13

## 2023-06-10 RX ADMIN — CHLORHEXIDINE GLUCONATE 1 APPLICATION(S): 213 SOLUTION TOPICAL at 12:18

## 2023-06-10 RX ADMIN — Medication 5 MILLIGRAM(S): at 12:18

## 2023-06-10 RX ADMIN — Medication 650 MILLIGRAM(S): at 07:29

## 2023-06-10 RX ADMIN — LIDOCAINE 1 PATCH: 4 CREAM TOPICAL at 08:00

## 2023-06-10 RX ADMIN — ATENOLOL 50 MILLIGRAM(S): 25 TABLET ORAL at 17:23

## 2023-06-10 RX ADMIN — HEPARIN SODIUM 5000 UNIT(S): 5000 INJECTION INTRAVENOUS; SUBCUTANEOUS at 06:39

## 2023-06-10 RX ADMIN — Medication 650 MILLIGRAM(S): at 13:13

## 2023-06-10 RX ADMIN — Medication 100 MILLIGRAM(S): at 06:31

## 2023-06-10 RX ADMIN — LIDOCAINE 1 PATCH: 4 CREAM TOPICAL at 17:54

## 2023-06-10 RX ADMIN — ZOLPIDEM TARTRATE 5 MILLIGRAM(S): 10 TABLET ORAL at 21:55

## 2023-06-10 RX ADMIN — Medication 650 MILLIGRAM(S): at 06:29

## 2023-06-10 RX ADMIN — Medication 650 MILLIGRAM(S): at 22:46

## 2023-06-10 RX ADMIN — Medication 650 MILLIGRAM(S): at 13:57

## 2023-06-10 RX ADMIN — HEPARIN SODIUM 5000 UNIT(S): 5000 INJECTION INTRAVENOUS; SUBCUTANEOUS at 17:24

## 2023-06-10 RX ADMIN — Medication 100 MILLIGRAM(S): at 13:06

## 2023-06-10 RX ADMIN — Medication 100 MILLIGRAM(S): at 21:47

## 2023-06-10 RX ADMIN — Medication 650 MILLIGRAM(S): at 21:46

## 2023-06-10 RX ADMIN — PANTOPRAZOLE SODIUM 40 MILLIGRAM(S): 20 TABLET, DELAYED RELEASE ORAL at 06:28

## 2023-06-10 RX ADMIN — ATENOLOL 50 MILLIGRAM(S): 25 TABLET ORAL at 06:29

## 2023-06-10 RX ADMIN — LIDOCAINE 1 PATCH: 4 CREAM TOPICAL at 06:38

## 2023-06-11 LAB
ANION GAP SERPL CALC-SCNC: 14 MMOL/L — SIGNIFICANT CHANGE UP (ref 7–14)
BUN SERPL-MCNC: 21 MG/DL — SIGNIFICANT CHANGE UP (ref 7–23)
CALCIUM SERPL-MCNC: 8.5 MG/DL — SIGNIFICANT CHANGE UP (ref 8.4–10.5)
CHLORIDE SERPL-SCNC: 102 MMOL/L — SIGNIFICANT CHANGE UP (ref 98–107)
CO2 SERPL-SCNC: 18 MMOL/L — LOW (ref 22–31)
CREAT SERPL-MCNC: 1.57 MG/DL — HIGH (ref 0.5–1.3)
EGFR: 30 ML/MIN/1.73M2 — LOW
GLUCOSE SERPL-MCNC: 109 MG/DL — HIGH (ref 70–99)
HCT VFR BLD CALC: 26.2 % — LOW (ref 34.5–45)
HGB BLD-MCNC: 8.4 G/DL — LOW (ref 11.5–15.5)
MAGNESIUM SERPL-MCNC: 1.5 MG/DL — LOW (ref 1.6–2.6)
MCHC RBC-ENTMCNC: 29.5 PG — SIGNIFICANT CHANGE UP (ref 27–34)
MCHC RBC-ENTMCNC: 32.1 GM/DL — SIGNIFICANT CHANGE UP (ref 32–36)
MCV RBC AUTO: 91.9 FL — SIGNIFICANT CHANGE UP (ref 80–100)
NRBC # BLD: 0 /100 WBCS — SIGNIFICANT CHANGE UP (ref 0–0)
NRBC # FLD: 0 K/UL — SIGNIFICANT CHANGE UP (ref 0–0)
PHOSPHATE SERPL-MCNC: 3.9 MG/DL — SIGNIFICANT CHANGE UP (ref 2.5–4.5)
PLATELET # BLD AUTO: 566 K/UL — HIGH (ref 150–400)
POTASSIUM SERPL-MCNC: 4.4 MMOL/L — SIGNIFICANT CHANGE UP (ref 3.5–5.3)
POTASSIUM SERPL-SCNC: 4.4 MMOL/L — SIGNIFICANT CHANGE UP (ref 3.5–5.3)
RBC # BLD: 2.85 M/UL — LOW (ref 3.8–5.2)
RBC # FLD: 12 % — SIGNIFICANT CHANGE UP (ref 10.3–14.5)
SODIUM SERPL-SCNC: 134 MMOL/L — LOW (ref 135–145)
VIT D25+D1,25 OH+D1,25 PNL SERPL-MCNC: 43.7 PG/ML — SIGNIFICANT CHANGE UP (ref 19.9–79.3)
WBC # BLD: 11.65 K/UL — HIGH (ref 3.8–10.5)
WBC # FLD AUTO: 11.65 K/UL — HIGH (ref 3.8–10.5)

## 2023-06-11 PROCEDURE — 99232 SBSQ HOSP IP/OBS MODERATE 35: CPT

## 2023-06-11 RX ORDER — SODIUM CHLORIDE 9 MG/ML
1000 INJECTION INTRAMUSCULAR; INTRAVENOUS; SUBCUTANEOUS
Refills: 0 | Status: DISCONTINUED | OUTPATIENT
Start: 2023-06-11 | End: 2023-06-11

## 2023-06-11 RX ORDER — SODIUM CHLORIDE 9 MG/ML
1000 INJECTION INTRAMUSCULAR; INTRAVENOUS; SUBCUTANEOUS
Refills: 0 | Status: DISCONTINUED | OUTPATIENT
Start: 2023-06-11 | End: 2023-06-13

## 2023-06-11 RX ORDER — MAGNESIUM SULFATE 500 MG/ML
2 VIAL (ML) INJECTION ONCE
Refills: 0 | Status: COMPLETED | OUTPATIENT
Start: 2023-06-11 | End: 2023-06-11

## 2023-06-11 RX ORDER — HYDRALAZINE HCL 50 MG
10 TABLET ORAL ONCE
Refills: 0 | Status: COMPLETED | OUTPATIENT
Start: 2023-06-11 | End: 2023-06-11

## 2023-06-11 RX ORDER — SODIUM CHLORIDE 0.65 %
1 AEROSOL, SPRAY (ML) NASAL
Refills: 0 | Status: DISCONTINUED | OUTPATIENT
Start: 2023-06-11 | End: 2023-06-13

## 2023-06-11 RX ORDER — AMLODIPINE BESYLATE 2.5 MG/1
5 TABLET ORAL DAILY
Refills: 0 | Status: DISCONTINUED | OUTPATIENT
Start: 2023-06-11 | End: 2023-06-11

## 2023-06-11 RX ADMIN — Medication 100 MILLIGRAM(S): at 14:27

## 2023-06-11 RX ADMIN — MUPIROCIN 1 APPLICATION(S): 20 OINTMENT TOPICAL at 05:21

## 2023-06-11 RX ADMIN — Medication 650 MILLIGRAM(S): at 22:46

## 2023-06-11 RX ADMIN — LIDOCAINE 1 PATCH: 4 CREAM TOPICAL at 07:08

## 2023-06-11 RX ADMIN — LIDOCAINE 1 PATCH: 4 CREAM TOPICAL at 18:48

## 2023-06-11 RX ADMIN — Medication 5 MILLIGRAM(S): at 14:29

## 2023-06-11 RX ADMIN — CHLORHEXIDINE GLUCONATE 1 APPLICATION(S): 213 SOLUTION TOPICAL at 14:30

## 2023-06-11 RX ADMIN — PANTOPRAZOLE SODIUM 40 MILLIGRAM(S): 20 TABLET, DELAYED RELEASE ORAL at 05:22

## 2023-06-11 RX ADMIN — SODIUM CHLORIDE 50 MILLILITER(S): 9 INJECTION INTRAMUSCULAR; INTRAVENOUS; SUBCUTANEOUS at 08:08

## 2023-06-11 RX ADMIN — Medication 100 MILLIGRAM(S): at 21:10

## 2023-06-11 RX ADMIN — HEPARIN SODIUM 5000 UNIT(S): 5000 INJECTION INTRAVENOUS; SUBCUTANEOUS at 17:43

## 2023-06-11 RX ADMIN — ATENOLOL 50 MILLIGRAM(S): 25 TABLET ORAL at 17:42

## 2023-06-11 RX ADMIN — Medication 25 GRAM(S): at 08:01

## 2023-06-11 RX ADMIN — Medication 650 MILLIGRAM(S): at 23:46

## 2023-06-11 RX ADMIN — MUPIROCIN 1 APPLICATION(S): 20 OINTMENT TOPICAL at 17:44

## 2023-06-11 RX ADMIN — Medication 1 SPRAY(S): at 17:42

## 2023-06-11 RX ADMIN — Medication 100 MILLIGRAM(S): at 05:21

## 2023-06-11 RX ADMIN — HEPARIN SODIUM 5000 UNIT(S): 5000 INJECTION INTRAVENOUS; SUBCUTANEOUS at 05:22

## 2023-06-11 RX ADMIN — ATENOLOL 50 MILLIGRAM(S): 25 TABLET ORAL at 05:22

## 2023-06-11 RX ADMIN — ZOLPIDEM TARTRATE 5 MILLIGRAM(S): 10 TABLET ORAL at 22:46

## 2023-06-11 RX ADMIN — SODIUM CHLORIDE 75 MILLILITER(S): 9 INJECTION INTRAMUSCULAR; INTRAVENOUS; SUBCUTANEOUS at 11:46

## 2023-06-11 NOTE — DIETITIAN INITIAL EVALUATION ADULT - PERTINENT LABORATORY DATA
06-11    134<L>  |  102  |  21  ----------------------------<  109<H>  4.4   |  18<L>  |  1.57<H>    Ca    8.5      11 Jun 2023 05:40  Phos  3.9     06-11  Mg     1.50     06-11    TPro  5.5<L>  /  Alb  3.1<L>  /  TBili  <0.2  /  DBili  x   /  AST  12  /  ALT  7   /  AlkPhos  62  06-10

## 2023-06-11 NOTE — DIETITIAN INITIAL EVALUATION ADULT - OTHER INFO
89 y/o female from NH with hx of HTN and HLD admitted with cellulitis of RLE. Visited with pt to obtain nutrition hx. Pt said she sustained an injury at the NH about a week ago. They gave her ABX and she developed N/V/D subsequent to taking medication. Pt said that GI distress has since resolved with oral intake now improving with fair intake presently. Last BM was 6/11. Pt unsure of any significant weight changes recently. She believes her weight has been stable PTA. She is a thin appearing woman with age related muscle and fat depletion. Given pt's age, suggest changing diet to Low Sodium only to allow for additional food items to be offered on menu. Food preferences taken and menu alternatives discussed. Discussed providing Magic Cups (290 kcal, 9 gm protein per 4 oz) x 2/day which pt is amenable towards receiving to optimize nutrition and oral intake. Education not warranted at this time. Encourage and monitor oral intake, especially of nutrition supplement. RDN services to remain available as needed.

## 2023-06-11 NOTE — DIETITIAN INITIAL EVALUATION ADULT - PERTINENT MEDS FT
MEDICATIONS  (STANDING):  atenolol  Tablet 50 milliGRAM(s) Oral two times a day  chlorhexidine 2% Cloths 1 Application(s) Topical daily  clindamycin IVPB 600 milliGRAM(s) IV Intermittent every 8 hours  heparin   Injectable 5000 Unit(s) SubCutaneous every 12 hours  lidocaine   4% Patch 1 Patch Transdermal every 24 hours  mupirocin 2% Ointment 1 Application(s) Topical two times a day  oxybutynin 5 milliGRAM(s) Oral daily  pantoprazole    Tablet 40 milliGRAM(s) Oral before breakfast  sodium chloride 0.9%. 1000 milliLiter(s) (50 mL/Hr) IV Continuous <Continuous>    MEDICATIONS  (PRN):  acetaminophen     Tablet .. 650 milliGRAM(s) Oral every 6 hours PRN Mild Pain (1 - 3), Moderate Pain (4 - 6)  ondansetron Injectable 4 milliGRAM(s) IV Push every 6 hours PRN Nausea and/or Vomiting  zolpidem 5 milliGRAM(s) Oral at bedtime PRN Insomnia  zolpidem 5 milliGRAM(s) Oral at bedtime PRN Insomnia

## 2023-06-12 LAB
ANION GAP SERPL CALC-SCNC: 13 MMOL/L — SIGNIFICANT CHANGE UP (ref 7–14)
BUN SERPL-MCNC: 16 MG/DL — SIGNIFICANT CHANGE UP (ref 7–23)
CALCIUM SERPL-MCNC: 8.7 MG/DL — SIGNIFICANT CHANGE UP (ref 8.4–10.5)
CHLORIDE SERPL-SCNC: 103 MMOL/L — SIGNIFICANT CHANGE UP (ref 98–107)
CO2 SERPL-SCNC: 19 MMOL/L — LOW (ref 22–31)
CREAT SERPL-MCNC: 1.45 MG/DL — HIGH (ref 0.5–1.3)
EGFR: 33 ML/MIN/1.73M2 — LOW
GLUCOSE SERPL-MCNC: 116 MG/DL — HIGH (ref 70–99)
HCT VFR BLD CALC: 24.9 % — LOW (ref 34.5–45)
HGB BLD-MCNC: 8.1 G/DL — LOW (ref 11.5–15.5)
MAGNESIUM SERPL-MCNC: 2.1 MG/DL — SIGNIFICANT CHANGE UP (ref 1.6–2.6)
MCHC RBC-ENTMCNC: 28.9 PG — SIGNIFICANT CHANGE UP (ref 27–34)
MCHC RBC-ENTMCNC: 32.5 GM/DL — SIGNIFICANT CHANGE UP (ref 32–36)
MCV RBC AUTO: 88.9 FL — SIGNIFICANT CHANGE UP (ref 80–100)
NRBC # BLD: 0 /100 WBCS — SIGNIFICANT CHANGE UP (ref 0–0)
NRBC # FLD: 0 K/UL — SIGNIFICANT CHANGE UP (ref 0–0)
PHOSPHATE SERPL-MCNC: 3.3 MG/DL — SIGNIFICANT CHANGE UP (ref 2.5–4.5)
PLATELET # BLD AUTO: 616 K/UL — HIGH (ref 150–400)
POTASSIUM SERPL-MCNC: 4.4 MMOL/L — SIGNIFICANT CHANGE UP (ref 3.5–5.3)
POTASSIUM SERPL-SCNC: 4.4 MMOL/L — SIGNIFICANT CHANGE UP (ref 3.5–5.3)
RBC # BLD: 2.8 M/UL — LOW (ref 3.8–5.2)
RBC # FLD: 12.1 % — SIGNIFICANT CHANGE UP (ref 10.3–14.5)
SARS-COV-2 RNA SPEC QL NAA+PROBE: DETECTED
SODIUM SERPL-SCNC: 135 MMOL/L — SIGNIFICANT CHANGE UP (ref 135–145)
WBC # BLD: 10.6 K/UL — HIGH (ref 3.8–10.5)
WBC # FLD AUTO: 10.6 K/UL — HIGH (ref 3.8–10.5)

## 2023-06-12 PROCEDURE — 76770 US EXAM ABDO BACK WALL COMP: CPT | Mod: 26

## 2023-06-12 PROCEDURE — 99233 SBSQ HOSP IP/OBS HIGH 50: CPT

## 2023-06-12 RX ORDER — SODIUM CHLORIDE 9 MG/ML
1000 INJECTION INTRAMUSCULAR; INTRAVENOUS; SUBCUTANEOUS
Refills: 0 | Status: DISCONTINUED | OUTPATIENT
Start: 2023-06-12 | End: 2023-06-13

## 2023-06-12 RX ADMIN — Medication 300 MILLIGRAM(S): at 18:15

## 2023-06-12 RX ADMIN — Medication 300 MILLIGRAM(S): at 12:46

## 2023-06-12 RX ADMIN — LIDOCAINE 1 PATCH: 4 CREAM TOPICAL at 08:18

## 2023-06-12 RX ADMIN — PANTOPRAZOLE SODIUM 40 MILLIGRAM(S): 20 TABLET, DELAYED RELEASE ORAL at 06:51

## 2023-06-12 RX ADMIN — LIDOCAINE 1 PATCH: 4 CREAM TOPICAL at 06:51

## 2023-06-12 RX ADMIN — HEPARIN SODIUM 5000 UNIT(S): 5000 INJECTION INTRAVENOUS; SUBCUTANEOUS at 06:52

## 2023-06-12 RX ADMIN — ATENOLOL 50 MILLIGRAM(S): 25 TABLET ORAL at 06:51

## 2023-06-12 RX ADMIN — ATENOLOL 50 MILLIGRAM(S): 25 TABLET ORAL at 18:15

## 2023-06-12 RX ADMIN — SODIUM CHLORIDE 75 MILLILITER(S): 9 INJECTION INTRAMUSCULAR; INTRAVENOUS; SUBCUTANEOUS at 12:29

## 2023-06-12 RX ADMIN — HEPARIN SODIUM 5000 UNIT(S): 5000 INJECTION INTRAVENOUS; SUBCUTANEOUS at 18:15

## 2023-06-12 RX ADMIN — CHLORHEXIDINE GLUCONATE 1 APPLICATION(S): 213 SOLUTION TOPICAL at 12:42

## 2023-06-12 RX ADMIN — Medication 5 MILLIGRAM(S): at 12:30

## 2023-06-12 RX ADMIN — MUPIROCIN 1 APPLICATION(S): 20 OINTMENT TOPICAL at 06:52

## 2023-06-12 RX ADMIN — MUPIROCIN 1 APPLICATION(S): 20 OINTMENT TOPICAL at 18:16

## 2023-06-12 RX ADMIN — Medication 650 MILLIGRAM(S): at 23:33

## 2023-06-12 RX ADMIN — ZOLPIDEM TARTRATE 5 MILLIGRAM(S): 10 TABLET ORAL at 22:33

## 2023-06-12 RX ADMIN — Medication 100 MILLIGRAM(S): at 06:52

## 2023-06-12 RX ADMIN — Medication 650 MILLIGRAM(S): at 22:33

## 2023-06-12 NOTE — ADVANCED PRACTICE NURSE CONSULT - REASON FOR CONSULT
Patient seen on skin care rounds after wound care referral received for assessment of skin impairment and recommendations of topical management. Patient H/O of HTN, HLD, arthritis presents to the ED for N/V , diarrhea and RLE wound admitted for RLE cellulitis.   Chart reviewed: WBC 10.60, h/h 8.1/24.9, platelets 616, Serum albumin 3.1, BMI 22.4, Benjy 17, patient interviewed stating she hit her leg by the side of the walker around memorial day.

## 2023-06-12 NOTE — ADVANCED PRACTICE NURSE CONSULT - ASSESSMENT
General: A&Ox4, turns one assist, continent of urine and stool. Skin warm, dry fragile, thin, poor skin turgor,  scattered areas of ecchymosis on bilateral upper extremities. Blanchable erythema on right heel.     Vascular: Bilateral lower extremities with scattered areas of hyper and hypopigmentation. Thickened toenails. No temperature changes noted. +2  Edema to RLE. Capillary refill <3 seconds. Faint but palpable DP/PT pulses.     Right lower leg trauma wound measuring [id number]- at first glance tissue exposing 60% moist yellow tan fibrinous slough and 40% pink moist agranular. After cleansing large percentage of fibrinous slough able to be removed leaving about 20% slough in the wound firmly attached and 80% moist agranular. Burning noted by patient during cleansing. Small serous drainage, no odor. Periwound skin with blanching erythema extending 3cm. Slight increase in warmth. (+) edema. No induration. Goals of care: Autolytic debridement, protect periwound skin, moist wound healing.

## 2023-06-12 NOTE — ADVANCED PRACTICE NURSE CONSULT - RECOMMEDATIONS
Recommend follow up care at [facility] ([phone], [address] [place] [address]).     Topical recommendations:     Right lower leg- Cleanse with NS, pat dry. Apply Liquid barrier film to periwound skin (allow to dry). Apply Medihoney gel to base of wound, cover with silicone foam with border. Change daily. (Alternate recommendations if decrease in frequency of dressing changes needed: Cleanse with NS, pat dry. Apply Liquid barrier film to periwound skin (allow to dry). Apply Medihoney hydrocolloid to base, cover with silicone foam with border. Change every other day.     Bilateral heels- apply LBF daily. Monitor for skin changes to right heel.     Continue low air loss bed therapy,  heel elevation with offloading boots, turn & reposition q2h with Z-flow fluidized pillow, continue moisture management with barrier creams as specified above & single breathable pad, continue measures to decrease friction/shear.   Plan discussed with patient- educated on topical wound therapy to optimize wound healing. Questions answered.     Please contact Wound/Ostomy Care Service Line if we can be of further assistance (ext [phone]).  Recommend follow up care at [facility] ([phone], [address] [place] [address]).     Topical recommendations:     Right lower leg- Cleanse with NS, pat dry. Apply Liquid barrier film to periwound skin (allow to dry). Apply Medihoney gel to base of wound, cover with silicone foam with border. Change daily. (Alternate recommendations if decrease in frequency of dressing changes needed: Cleanse with NS, pat dry. Apply Liquid barrier film to periwound skin (allow to dry). Apply Medihoney hydrocolloid to base, cover with silicone foam with border. Change every other day.     Bilateral heels- apply LBF daily. Monitor for skin changes to right heel.     Continue low air loss bed therapy,  heel elevation with offloading boots, turn & reposition q2h with Z-flow fluidized pillow, continue moisture management with barrier creams as specified above & single breathable pad, continue measures to decrease friction/shear.   Plan discussed with patient- educated on topical wound therapy to optimize wound healing. Questions answered.     Recs discussed with Acp provider- Vivian Carrillo.     Please contact Wound/Ostomy Care Service Line if we can be of further assistance (ext [phone]).

## 2023-06-13 ENCOUNTER — TRANSCRIPTION ENCOUNTER (OUTPATIENT)
Age: 88
End: 2023-06-13

## 2023-06-13 VITALS — HEART RATE: 75 BPM | DIASTOLIC BLOOD PRESSURE: 58 MMHG | SYSTOLIC BLOOD PRESSURE: 188 MMHG

## 2023-06-13 LAB
ANION GAP SERPL CALC-SCNC: 15 MMOL/L — HIGH (ref 7–14)
BUN SERPL-MCNC: 13 MG/DL — SIGNIFICANT CHANGE UP (ref 7–23)
CALCIUM SERPL-MCNC: 9 MG/DL — SIGNIFICANT CHANGE UP (ref 8.4–10.5)
CHLORIDE SERPL-SCNC: 99 MMOL/L — SIGNIFICANT CHANGE UP (ref 98–107)
CO2 SERPL-SCNC: 20 MMOL/L — LOW (ref 22–31)
CREAT SERPL-MCNC: 1.28 MG/DL — SIGNIFICANT CHANGE UP (ref 0.5–1.3)
EGFR: 38 ML/MIN/1.73M2 — LOW
GLUCOSE SERPL-MCNC: 120 MG/DL — HIGH (ref 70–99)
HCT VFR BLD CALC: 26.9 % — LOW (ref 34.5–45)
HGB BLD-MCNC: 8.8 G/DL — LOW (ref 11.5–15.5)
MAGNESIUM SERPL-MCNC: 1.9 MG/DL — SIGNIFICANT CHANGE UP (ref 1.6–2.6)
MCHC RBC-ENTMCNC: 28.6 PG — SIGNIFICANT CHANGE UP (ref 27–34)
MCHC RBC-ENTMCNC: 32.7 GM/DL — SIGNIFICANT CHANGE UP (ref 32–36)
MCV RBC AUTO: 87.3 FL — SIGNIFICANT CHANGE UP (ref 80–100)
NRBC # BLD: 0 /100 WBCS — SIGNIFICANT CHANGE UP (ref 0–0)
NRBC # FLD: 0 K/UL — SIGNIFICANT CHANGE UP (ref 0–0)
PHOSPHATE SERPL-MCNC: 3.1 MG/DL — SIGNIFICANT CHANGE UP (ref 2.5–4.5)
PLATELET # BLD AUTO: 686 K/UL — HIGH (ref 150–400)
POTASSIUM SERPL-MCNC: 4.6 MMOL/L — SIGNIFICANT CHANGE UP (ref 3.5–5.3)
POTASSIUM SERPL-SCNC: 4.6 MMOL/L — SIGNIFICANT CHANGE UP (ref 3.5–5.3)
RBC # BLD: 3.08 M/UL — LOW (ref 3.8–5.2)
RBC # FLD: 12.1 % — SIGNIFICANT CHANGE UP (ref 10.3–14.5)
SODIUM SERPL-SCNC: 134 MMOL/L — LOW (ref 135–145)
WBC # BLD: 10.67 K/UL — HIGH (ref 3.8–10.5)
WBC # FLD AUTO: 10.67 K/UL — HIGH (ref 3.8–10.5)

## 2023-06-13 PROCEDURE — 99239 HOSP IP/OBS DSCHRG MGMT >30: CPT

## 2023-06-13 RX ORDER — HYDRALAZINE HCL 50 MG
10 TABLET ORAL ONCE
Refills: 0 | Status: COMPLETED | OUTPATIENT
Start: 2023-06-13 | End: 2023-06-13

## 2023-06-13 RX ADMIN — MUPIROCIN 1 APPLICATION(S): 20 OINTMENT TOPICAL at 05:36

## 2023-06-13 RX ADMIN — Medication 300 MILLIGRAM(S): at 11:50

## 2023-06-13 RX ADMIN — Medication 650 MILLIGRAM(S): at 12:22

## 2023-06-13 RX ADMIN — Medication 10 MILLIGRAM(S): at 06:56

## 2023-06-13 RX ADMIN — ATENOLOL 50 MILLIGRAM(S): 25 TABLET ORAL at 17:36

## 2023-06-13 RX ADMIN — Medication 300 MILLIGRAM(S): at 00:02

## 2023-06-13 RX ADMIN — MUPIROCIN 1 APPLICATION(S): 20 OINTMENT TOPICAL at 17:37

## 2023-06-13 RX ADMIN — PANTOPRAZOLE SODIUM 40 MILLIGRAM(S): 20 TABLET, DELAYED RELEASE ORAL at 05:39

## 2023-06-13 RX ADMIN — HEPARIN SODIUM 5000 UNIT(S): 5000 INJECTION INTRAVENOUS; SUBCUTANEOUS at 17:37

## 2023-06-13 RX ADMIN — Medication 300 MILLIGRAM(S): at 17:36

## 2023-06-13 RX ADMIN — Medication 5 MILLIGRAM(S): at 11:50

## 2023-06-13 RX ADMIN — LIDOCAINE 1 PATCH: 4 CREAM TOPICAL at 18:06

## 2023-06-13 RX ADMIN — Medication 300 MILLIGRAM(S): at 05:36

## 2023-06-13 RX ADMIN — ATENOLOL 50 MILLIGRAM(S): 25 TABLET ORAL at 05:36

## 2023-06-13 RX ADMIN — LIDOCAINE 1 PATCH: 4 CREAM TOPICAL at 08:43

## 2023-06-13 RX ADMIN — LIDOCAINE 1 PATCH: 4 CREAM TOPICAL at 05:39

## 2023-06-13 RX ADMIN — Medication 650 MILLIGRAM(S): at 13:00

## 2023-06-13 RX ADMIN — HEPARIN SODIUM 5000 UNIT(S): 5000 INJECTION INTRAVENOUS; SUBCUTANEOUS at 05:36

## 2023-06-13 RX ADMIN — CHLORHEXIDINE GLUCONATE 1 APPLICATION(S): 213 SOLUTION TOPICAL at 11:51

## 2023-06-13 NOTE — PHYSICAL THERAPY INITIAL EVALUATION ADULT - PATIENT PROFILE REVIEW, REHAB EVAL
PT initial evaluation received and chart review completed. Pt agreeable to participate in PT evaluation. Pt cleared by LOUIE Zelaya./yes

## 2023-06-13 NOTE — PHYSICAL THERAPY INITIAL EVALUATION ADULT - GAIT DEVIATIONS NOTED, PT EVAL
excessive thoracic kyphosis/decreased paola/decreased velocity of limb motion/decreased step length/decreased stride length/decreased weight-shifting ability

## 2023-06-13 NOTE — PROGRESS NOTE ADULT - PROBLEM SELECTOR PLAN 4
C/w atenolol  Hold irbesartan given DIMITRI  Monitor pressures

## 2023-06-13 NOTE — PHYSICAL THERAPY INITIAL EVALUATION ADULT - PERTINENT HX OF CURRENT PROBLEM, REHAB EVAL
Pt is a 89 year old female presenting with nausea and vomiting, diarrhea, and RLE wound. Pt states she tripped on her walker sustaining injury to RLE ~2 weeks ago. Pt reports worsening pain, redness, and swelling in RLE as well as subjective fevers. Right tib/fib XR revealed lower leg and lateral malleolar edema. Pt admitted with right LE cellulitis.

## 2023-06-13 NOTE — PROGRESS NOTE ADULT - TIME BILLING
Time-based billing (NON-critical care).     50 minutes spent on total encounter; more than 50% of the visit was spent counseling and / or coordinating care by the attending physician.  The necessity of the time spent during the encounter on this date of service was due to:     review of laboratory data, radiology results, consultants' recommendations, documentation in [place], discussion with patient/family, ACP and interdisciplinary staff (such as , social workers, etc). Interventions were performed as documented above.
Time-based billing (NON-critical care).     35 minutes spent on total encounter; more than 50% of the visit was spent counseling and / or coordinating care by the attending physician.  The necessity of the time spent during the encounter on this date of service was due to:     review of laboratory data, radiology results, consultants' recommendations, documentation in [place], discussion with patient/ACP and interdisciplinary staff (such as , social workers, etc). Interventions were performed as documented above.

## 2023-06-13 NOTE — PROGRESS NOTE ADULT - PROBLEM SELECTOR PLAN 5
Drop in hb noted 9.1-->7.5, likely dilutional, today improved to 8.4  Likely AP, monitor for now with outpatient follow up
Transient drop in hb noted 9.1-->7.5, likely dilutional, now stable at 8.8  Likely AP, monitor for now with outpatient follow up
Drop in hb noted 9.1-->7.5, likely dilutional, today improved to 8.4  Likely AP, monitor for now with outpatient follow up
Drop in hb noted 9.1-->7.5, likely dilutional   Unclear etiology for anemia yet, will repeat and add on iron panel.

## 2023-06-13 NOTE — DISCHARGE NOTE PROVIDER - HOSPITAL COURSE
Patient is a 89yo F with PMH significant for HTN, HLD, and arthritis, who presented with nausea and vomiting. She had recently tripped and fallen over her walker 1-2 weeks prior to presentation with a resultant wound on her lateral R ankle. She had been given antibiotics with Keflex but began to experience significant N/V and diarrhea after the 2nd dose. She was admitted to the hospital with N/V/D, RLE wound, and DIMITRI. Her kidney function showed improvement with IVF. She had no further N/V/D after cessation of Keflex. She was given IV clindamycin for her wound, which was transitioned to po clindamycin and tolerated. She was seen by the wound care team, with recommendations given for the wound.     She incidentally tested positive for COVID. She stated she had tested positive 3 weeks prior, and is currently without symptoms.    She is hemodynamically stable and medically ready for discharge.

## 2023-06-13 NOTE — DISCHARGE NOTE PROVIDER - NSDCMRMEDTOKEN_GEN_ALL_CORE_FT
acetaminophen 325 mg oral capsule: 2 cap(s) orally every 6 hours  atenolol 50 mg oral tablet: 1 tab(s) orally 2 times a day  Avapro 300 mg oral tablet: 1 tab(s) orally once a day  Ditropan XL 5 mg/24 hours oral tablet, extended release: 1 tab(s) orally once a day at bedtime  eszopiclone 3 mg oral tablet: 1 tab(s) orally once a day  lidocaine 4% topical film: Apply topically to affected area once a day  pantoprazole 40 mg oral delayed release tablet: 1 tab(s) orally once a day (before a meal)  polyethylene glycol 3350 oral powder for reconstitution: 17 gram(s) orally once a day  senna leaf extract oral tablet: 2 tab(s) orally once a day (at bedtime)  Vitamin B-12 1000 mcg oral tablet: 1 tab(s) orally once a day  Vitamin D3: 1000 unit(s) orally once a day   acetaminophen 325 mg oral capsule: 2 cap(s) orally every 6 hours  atenolol 50 mg oral tablet: 1 tab(s) orally 2 times a day  Avapro 300 mg oral tablet: 1 tab(s) orally once a day  clindamycin 300 mg oral capsule: 1 cap(s) orally every 6 hours  Ditropan XL 5 mg/24 hours oral tablet, extended release: 1 tab(s) orally once a day at bedtime  eszopiclone 3 mg oral tablet: 1 tab(s) orally once a day  lidocaine 4% topical film: Apply topically to affected area once a day  pantoprazole 40 mg oral delayed release tablet: 1 tab(s) orally once a day (before a meal)  polyethylene glycol 3350 oral powder for reconstitution: 17 gram(s) orally once a day  senna leaf extract oral tablet: 2 tab(s) orally once a day (at bedtime)  Vitamin B-12 1000 mcg oral tablet: 1 tab(s) orally once a day  Vitamin D3: 1000 unit(s) orally once a day

## 2023-06-13 NOTE — PROGRESS NOTE ADULT - PROBLEM SELECTOR PLAN 3
- Elevated sCR noted. In Dec of 22 Cr around 1   - Suspect in setting of decreased PO intake /N/V  - C/w IV gentle hydration through today
-Elevated sCR noted. Baseline 1.49 in 12/2022  -Suspect in setting of decreased PO intake /N/V  -S/p IVF Bolus 1L  -C/w gentle hydration  -Obtain urine lytes  -Avoid nephrotoxins
-Elevated sCR noted. In Dec of 22 Cr around 1   -Suspect in setting of decreased PO intake /N/V  -S/p IVF Bolus 1L  -C/w IV gentle hydration through today
- Elevated sCR noted. In Dec of 22 Cr around 1   - Suspect in setting of decreased PO intake /N/V  - C/w IV gentle hydration through today

## 2023-06-13 NOTE — DISCHARGE NOTE NURSING/CASE MANAGEMENT/SOCIAL WORK - PATIENT PORTAL LINK FT
You can access the FollowMyHealth Patient Portal offered by [facility] by registering at the following website: [email or web address]. By joining [employer]’s FollowMyHealth portal, you will also be able to view your health information using other applications (apps) compatible with our system.

## 2023-06-13 NOTE — PROGRESS NOTE ADULT - PROBLEM SELECTOR PLAN 1
-2/2 RLE wound s/p mechanical fall  -X-Ray tib/fib w/ LE edema  -Was started on Clindamycin given intolerance to keflex  -Wound care eval consulted  -Monitor for fevers /leukocytosis  -Blood Cx x 2 if febrile
- 2/2 RLE wound s/p mechanical fall  - X-Ray tib/fib w/ LE edema  - Was started on Clindamycin given intolerance to keflex; switched to po clindamycin and seems to be tolerated well  - complete course of 7 days total  - Wound care eval consulted  - Monitor for fevers /leukocytosis  - Blood Cx x 2 if febrile
-2/2 RLE wound s/p mechanical fall  -X-Ray tib/fib w/ LE edema  -Was started on Clindamycin given intolerance to keflex  -Wound care eval  -Monitor for fevers /leukocytosis  -Blood Cx x 2 if febrile
- 2/2 RLE wound s/p mechanical fall  - X-Ray tib/fib w/ LE edema  - Was started on Clindamycin given intolerance to keflex; will switch to po clindamycin and observe tolerance  - Wound care eval consulted  - Monitor for fevers /leukocytosis  - Blood Cx x 2 if febrile

## 2023-06-13 NOTE — DISCHARGE NOTE PROVIDER - CARE PROVIDER_API CALL
COGAN, FREDRIC  [address] [place] [address]  Phone: [phone]  Fax: [phone]  Follow Up Time:

## 2023-06-13 NOTE — DISCHARGE NOTE PROVIDER - NSDCCPCAREPLAN_GEN_ALL_CORE_FT
PRINCIPAL DISCHARGE DIAGNOSIS  Diagnosis: Cellulitis of right lower extremity  Assessment and Plan of Treatment: You were being treated with Keflex for a right ankle wound, which you did not tolerate, causing nausea and vomiting. You were given IV antibiotics with clindamycin in the hospital and this was switched to po clindamycin to complete a TOTAL of 7 days of clindamycin. You were seen by the wound care team.

## 2023-06-13 NOTE — CHART NOTE - NSCHARTNOTEFT_GEN_A_CORE
As per discussion with KEKE Peres at patient's residence: [facility] [address] [place] [address], [phone] ext: [phone], Pt tested positive for COVID on a rapid test on 6/3, and negative on 6/9.
Notified by RN of patient feeling depressed during the night time.    Provider went to bedside to discuss with patient about feelings. Pt reports that she was feeling overwhelmed in the moment of finding out that she was testing positive for COVID. During evaluation patient report she feels well and no longer feels depressed. Denies any SI/HI during time.    No acute intervention at this time. Continued to offer support for patient during this time.

## 2023-06-13 NOTE — PROGRESS NOTE ADULT - PROBLEM SELECTOR PLAN 2
- Pt w. nausea/vomiting and diarrhea. Suspect in setting of recent abx, resolved for now  If further episodes are noted then reasonable to r/o possible infection  - Obtain GI PCR, Stool culture, O/P, C-Diff  - Monitor stool count
-Pt w. nausea/vomiting and diarrhea. Suspect in setting of recent abx, resolved for now  If further episodes are noted then reasonable to r/o possible infection  -Obtain GI PCR, Stool culture, O/P, C-Diff  -Monitor stool count
- Pt w. nausea/vomiting and diarrhea. Suspect in setting of recent abx, resolved for now  If further episodes are noted then reasonable to r/o possible infection  - Obtain GI PCR, Stool culture, O/P, C-Diff  - Monitor stool count
-Pt w. nausea/vomiting and diarrhea. Suspect in setting of recent abx, resolved for now  If further episodes are noted then reasonable to r/o possible infection  -Obtain GI PCR, Stool culture, O/P, C-Diff  -Monitor stool count

## 2023-06-13 NOTE — PROGRESS NOTE ADULT - PROBLEM SELECTOR PROBLEM 3
DIMITRI (acute kidney injury)

## 2023-06-13 NOTE — DISCHARGE NOTE PROVIDER - ATTENDING DISCHARGE PHYSICAL EXAMINATION:
PHYSICAL EXAM:  Vital Signs Last 24 Hrs  T(C): 37.2 (13 Jun 2023 05:10), Max: 37.2 (13 Jun 2023 05:10)  T(F): 98.9 (13 Jun 2023 05:10), Max: 98.9 (13 Jun 2023 05:10)  HR: 68 (13 Jun 2023 05:10) (68 - 75)  BP: 164/48 (13 Jun 2023 07:55) (149/56 - 186/62)  BP(mean): --  RR: 18 (13 Jun 2023 05:10) (18 - 18)  SpO2: 96% (13 Jun 2023 05:10) (94% - 99%)    Parameters below as of 13 Jun 2023 05:10  Patient On (Oxygen Delivery Method): room air      CONSTITUTIONAL: NAD, well-developed, well-groomed  EYES: PERRLA; conjunctiva and sclera clear  ENMT: Moist oral mucosa, no pharyngeal injection or exudates; normal dentition  NECK: Supple, no palpable masses; no thyromegaly  RESPIRATORY: Normal respiratory effort; lungs are clear to auscultation bilaterally  CARDIOVASCULAR: Regular rate and rhythm, normal S1 and S2, no murmur/rub/gallop; No lower extremity edema; Peripheral pulses are 2+ bilaterally  ABDOMEN: Nontender to palpation, normoactive bowel sounds, no rebound/guarding; No hepatosplenomegaly  MUSCULOSKELETAL:  no clubbing or cyanosis of digits; no joint swelling or tenderness to palpation  PSYCH: A+O to person, place, and time; affect appropriate  NEUROLOGY: CN 2-12 are intact and symmetric; no gross sensory deficits   SKIN: No rashes; no palpable lesions; RLE ankle wound

## 2023-06-13 NOTE — PROGRESS NOTE ADULT - PROBLEM SELECTOR PLAN 7
DVT: HSQ  DIET: DASH/TLC  DISPO: Likely dc to facility today.
DVT: HSQ  DIET: DASH/TLC  DISPO: Pending hospital course.
DVT: HSQ  DIET: DASH/TLC  DISPO: Pending hospital course.
DVT: HSQ  DIET: DASH/TLC  DISPO: Pending hospital course.  Potential dc tomorrow.

## 2023-06-13 NOTE — PHYSICAL THERAPY INITIAL EVALUATION ADULT - ORIENTATION, REHAB EVAL
- diagnosis documented in pt's chart, no evidence of COPD exacerbation   - continue supplemental oxygen as needed (pt on 2L NC at home) KPS 10% - diagnosis documented in pt's chart, no evidence of COPD exacerbation   - continue supplemental oxygen as needed (pt on 2L NC at home) - diagnosis documented in pt's chart, no evidence of COPD exacerbation   - continue supplemental oxygen as needed (pt on 2L NC at home) - diagnosis documented in pt's chart, no evidence of COPD exacerbation   - continue supplemental oxygen as needed (pt on 2L NC at home) - diagnosis documented in pt's chart, no evidence of COPD exacerbation   - continue supplemental oxygen as needed (pt on 2L NC at home) - diagnosis documented in pt's chart, no evidence of COPD exacerbation   - continue supplemental oxygen as needed (pt on 2L NC at home) - diagnosis documented in pt's chart, no evidence of COPD exacerbation   - continue supplemental oxygen as needed (pt on 2L NC at home) KPS 10% KPS 10% KPS 10% KPS 10% KPS 10% Pt is actively dying; trajectory hours to days Pt is actively dying; trajectory now days to week  will discuss with son re inpt hospice transfer Symptoms well controlled. Patient in active dying process. DNR/DNI. c/w full support. KPS 10% oriented to person, place, time and situation

## 2023-06-13 NOTE — PROGRESS NOTE ADULT - ASSESSMENT
89 yo F with past medical history of HTN, HLD, arthritis presents to the ED for N/V , diarrhea and RLE wound admitted for RLE cellulitis
89 yo F with past medical history of HTN, HLD, arthritis presents to the ED for N/V , diarrhea and RLE wound admitted for RLE cellulitis

## 2023-06-13 NOTE — PHYSICAL THERAPY INITIAL EVALUATION ADULT - ADDITIONAL COMMENTS
Pt resides in [facility]isted Living Facility. Household ambulator without assistive device; community ambulator with rolling walker.

## 2023-06-13 NOTE — PROGRESS NOTE ADULT - PROBLEM SELECTOR PROBLEM 6
Attempted to call patient, no answer. Left vm to return office call.     
Letter sent.   
Patient is due for a mammogram. Attempted to call patient, no answer. Left vm to return office call.     
Overactive bladder

## 2023-06-13 NOTE — PROVIDER CONTACT NOTE (OTHER) - ACTION/TREATMENT ORDERED:
IVP hydralazine ordered
Patient denies suicidal ideation, no intent to harm self or others. No intervention necessary

## 2023-06-13 NOTE — DISCHARGE NOTE PROVIDER - NSDCCPTREATMENT_GEN_ALL_CORE_FT
PRINCIPAL PROCEDURE  Procedure: Wound care evaluation  Findings and Treatment: Recommendations from wound care team as below:   Right lower leg- Cleanse with NS, pat dry. Apply Liquid barrier film to periwound skin (allow to dry). Apply Medihoney gel to base of wound, cover with silicone foam with border. Change daily. (Alternate recommendations if decrease in frequency of dressing changes needed: Cleanse with NS, pat dry. Apply Liquid barrier film to periwound skin (allow to dry). Apply Medihoney hydrocolloid to base, cover with silicone foam with border. Change every other day.   Bilateral heels- apply LBF daily. Monitor for skin changes to right heel.   Continue low air loss bed therapy,  heel elevation with offloading boots, turn & reposition q2h with Z-flow fluidized pillow, continue moisture management with barrier creams as specified above & single breathable pad, continue measures to decrease friction/shear.

## 2023-06-13 NOTE — DISCHARGE NOTE PROVIDER - NSDCHHNEEDSERVICE_GEN_ALL_CORE
Daily Note     Today's date: 2019  Patient name: Blaise Peña  : 1960  MRN: [id number]  Referring provider: Rah Baldwin MD  Dx:   Encounter Diagnosis     ICD-10-CM    1  Cerebrovascular accident (CVA), unspecified mechanism ([facility] [address] ) [address] 9        Start Time: 1545  Stop Time: 1630  Total time in clinic (min): 45 minutes  Subjective: Patient reports he is worried about L foot - hurts 10/10 all the time although reports he is walking more on it now  L foot appears swollen with black areas on 4th and 5th toes  Pt reports MD is aware and stated they "can't do anything about it "      Objective: See treatment diary below  MH cervical spine x 10 min (pre session, pt arrived at 1535)  TRP B/L SCM and R UT , suboccipital release  IASTM R SCM and UT  OA MET into flexion and R SB - performed twice    Solo Step:  Amb fwd/bwd  9" hurdles - fwd and lateral  High marching with opposite hand to knee  Amb fwd/bwd with ball toss       Assessment: Patient was able to tolerate treatment session well today  Reports continued neck pain but has much improved with PT  Pt tolerates TRP better with decreased c/o pain  Noted limitations in OA flexion and R sidebending, performed MET as above with improvements in range noted afterwards  Introduced more gait and balance activities today since neck pain is not as severe/acute anymore  Pt most challenged with opposite hand to knee, needs to perform at decreased speed and with cueing  Also challenged with bwd amb and has difficulty controlling his speed  No increase in foot pain with gait activities  Pt will continue to benefit from skilled outpatient PT services to improve his balance and mobility to maximize his function  Plan: Continue per plan of care  Observation and assessment

## 2023-06-13 NOTE — PROGRESS NOTE ADULT - SUBJECTIVE AND OBJECTIVE BOX
[facility] Division of Hospital Medicine  Balbir Bird DO  Available via MS Teams  In house pager [phone]    SUBJECTIVE / OVERNIGHT EVENTS:  Overnight tested positive for COVID - patient states she recently had COVID a few weeks prior to presentation.  Without symptoms.  Denies acute complaints. Wishes for discharge soon.    ADDITIONAL REVIEW OF SYSTEMS:    MEDICATIONS  (STANDING):  atenolol  Tablet 50 milliGRAM(s) Oral two times a day  chlorhexidine 2% Cloths 1 Application(s) Topical daily  clindamycin   Capsule 300 milliGRAM(s) Oral every 6 hours  heparin   Injectable 5000 Unit(s) SubCutaneous every 12 hours  lidocaine   4% Patch 1 Patch Transdermal every 24 hours  mupirocin 2% Ointment 1 Application(s) Topical two times a day  oxybutynin 5 milliGRAM(s) Oral daily  pantoprazole    Tablet 40 milliGRAM(s) Oral before breakfast    MEDICATIONS  (PRN):  acetaminophen     Tablet .. 650 milliGRAM(s) Oral every 6 hours PRN Mild Pain (1 - 3), Moderate Pain (4 - 6)  ondansetron Injectable 4 milliGRAM(s) IV Push every 6 hours PRN Nausea and/or Vomiting  sodium chloride 0.65% Nasal 1 Spray(s) Both Nostrils two times a day PRN Nasal Congestion  zolpidem 5 milliGRAM(s) Oral at bedtime PRN Insomnia  zolpidem 5 milliGRAM(s) Oral at bedtime PRN Insomnia      I&O's Summary    13 Jun 2023 07:01  -  13 Jun 2023 12:46  --------------------------------------------------------  IN: 200 mL / OUT: 0 mL / NET: 200 mL        PHYSICAL EXAM:  Vital Signs Last 24 Hrs  T(C): 36.5 (13 Jun 2023 11:59), Max: 37.2 (13 Jun 2023 05:10)  T(F): 97.7 (13 Jun 2023 11:59), Max: 98.9 (13 Jun 2023 05:10)  HR: 71 (13 Jun 2023 11:59) (68 - 75)  BP: 150/61 (13 Jun 2023 11:59) (149/56 - 186/62)  BP(mean): --  RR: 16 (13 Jun 2023 11:59) (16 - 18)  SpO2: 99% (13 Jun 2023 11:59) (94% - 99%)    Parameters below as of 13 Jun 2023 11:59  Patient On (Oxygen Delivery Method): room air      CONSTITUTIONAL: NAD, well-developed, well-groomed  EYES: PERRLA; conjunctiva and sclera clear  ENMT: Moist oral mucosa, no pharyngeal injection or exudates; normal dentition  NECK: Supple, no palpable masses; no thyromegaly  RESPIRATORY: Normal respiratory effort; lungs are clear to auscultation bilaterally  CARDIOVASCULAR: Regular rate and rhythm, normal S1 and S2, no murmur/rub/gallop; No lower extremity edema; Peripheral pulses are 2+ bilaterally  ABDOMEN: Nontender to palpation, normoactive bowel sounds, no rebound/guarding; No hepatosplenomegaly  MUSCULOSKELETAL:  no clubbing or cyanosis of digits; no joint swelling or tenderness to palpation  PSYCH: A+O to person, place, and time; affect appropriate  NEUROLOGY: CN 2-12 are intact and symmetric; no gross sensory deficits   SKIN: No rashes; no palpable lesions; RLE ankle wound    LABS:                        8.8    10.67 )-----------( 686      ( 13 Jun 2023 06:26 )             26.9     06-13    134<L>  |  99  |  13  ----------------------------<  120<H>  4.6   |  20<L>  |  1.28    Ca    9.0      13 Jun 2023 06:26  Phos  3.1     06-13  Mg     1.90     06-13                COVID-19 PCR: Detected (12 Jun 2023 12:35)  COVID-19 PCR: NotDetec (21 Dec 2022 19:36)  COVID-19 PCR: NotDetec (19 Dec 2022 08:19)          COMMUNICATION:  Care Discussed with Consultants/Other Providers and Details of Discussion: d/w medicine NP, nurse manager    
Mario Alberto Ma MD  Academic Hospitalist  Pager [phone]  Email: [email or web address]  Available on Microsoft Teams        PROGRESS NOTE:     Patient is a 89y old  Female who presents with a chief complaint of Cellulitis (09 Jun 2023 16:10)      SUBJECTIVE / OVERNIGHT EVENTS:  Patient seen and examined this morning. States that her nausea, vomiting and diarrhea has resolved now, however she continues to have pain in her right calf.  ADDITIONAL REVIEW OF SYSTEMS:  No fevers    MEDICATIONS  (STANDING):  atenolol  Tablet 50 milliGRAM(s) Oral two times a day  chlorhexidine 2% Cloths 1 Application(s) Topical daily  clindamycin IVPB 600 milliGRAM(s) IV Intermittent every 8 hours  heparin   Injectable 5000 Unit(s) SubCutaneous every 12 hours  lactated ringers. 1000 milliLiter(s) (70 mL/Hr) IV Continuous <Continuous>  lidocaine   4% Patch 1 Patch Transdermal every 24 hours  oxybutynin 5 milliGRAM(s) Oral daily  pantoprazole    Tablet 40 milliGRAM(s) Oral before breakfast    MEDICATIONS  (PRN):  acetaminophen     Tablet .. 650 milliGRAM(s) Oral every 6 hours PRN Mild Pain (1 - 3), Moderate Pain (4 - 6)  ondansetron Injectable 4 milliGRAM(s) IV Push every 6 hours PRN Nausea and/or Vomiting  zolpidem 5 milliGRAM(s) Oral at bedtime PRN Insomnia  zolpidem 5 milliGRAM(s) Oral at bedtime PRN Insomnia      CAPILLARY BLOOD GLUCOSE        I&O's Summary      PHYSICAL EXAM:  Vital Signs Last 24 Hrs  T(C): 36.6 (10 Sky 2023 06:25), Max: 37.2 (09 Jun 2023 18:33)  T(F): 97.9 (10 Sky 2023 06:25), Max: 99 (09 Jun 2023 18:33)  HR: 68 (10 Sky 2023 06:25) (55 - 90)  BP: 155/56 (10 Sky 2023 06:25) (144/50 - 155/56)  BP(mean): --  RR: 18 (10 Sky 2023 06:25) (16 - 18)  SpO2: 93% (10 Sky 2023 06:25) (93% - 100%)    Parameters below as of 10 Sky 2023 06:25  Patient On (Oxygen Delivery Method): room air        Physical Exam: GENERAL: NAD  CHEST/LUNG: Clear to auscultation bilaterally; No wheeze, rhonchi, crackles or rales  HEART: Regular rate and rhythm; No murmurs, rubs, or gallops  ABDOMEN: Soft, Nontender, Nondistended; Bowel sounds present  EXTREMITIES:  2+ Peripheral Pulses, No edema  PSYCH: AAOx3  NEUROLOGY: non-focal  SKIN: +RLE wound w/ surrounding erythema (mid-shin to distal foot) and edema, no drainage    LABS:                        7.5    8.33  )-----------( 506      ( 10 Sky 2023 06:00 )             23.7     06-10    139  |  109<H>  |  20  ----------------------------<  100<H>  4.6   |  19<L>  |  1.57<H>    Ca    8.4      10 Sky 2023 06:00  Phos  4.3     06-10  Mg     1.50     06-10    TPro  5.5<L>  /  Alb  3.1<L>  /  TBili  <0.2  /  DBili  x   /  AST  12  /  ALT  7   /  AlkPhos  62  06-10                RADIOLOGY & ADDITIONAL TESTS:  Results Reviewed:   Imaging Personally Reviewed:  Electrocardiogram Personally Reviewed:    COORDINATION OF CARE:  Care Discussed with Consultants/Other Providers [Y/N]:  Prior or Outpatient Records Reviewed [Y/N]:  
[facility] Division of Hospital Medicine  Balbir Bird DO  Available via MS Teams  In house pager [phone]    SUBJECTIVE / OVERNIGHT EVENTS:  No acute events overnight.  Denies acute complaints.  Is hoping to have wound care see her wound.   Patient reported she had fallen and cut herself with a part of her walker, which she noticed 2 days after falling. She then started noticing symptoms of cellulitis around 4 days after the fall.  Daughter Lili at bedside offering corroborating history.      ADDITIONAL REVIEW OF SYSTEMS:    MEDICATIONS  (STANDING):  atenolol  Tablet 50 milliGRAM(s) Oral two times a day  chlorhexidine 2% Cloths 1 Application(s) Topical daily  clindamycin   Capsule 300 milliGRAM(s) Oral every 6 hours  heparin   Injectable 5000 Unit(s) SubCutaneous every 12 hours  lidocaine   4% Patch 1 Patch Transdermal every 24 hours  mupirocin 2% Ointment 1 Application(s) Topical two times a day  oxybutynin 5 milliGRAM(s) Oral daily  pantoprazole    Tablet 40 milliGRAM(s) Oral before breakfast  sodium chloride 0.9%. 1000 milliLiter(s) (75 mL/Hr) IV Continuous <Continuous>  sodium chloride 0.9%. 1000 milliLiter(s) (75 mL/Hr) IV Continuous <Continuous>    MEDICATIONS  (PRN):  acetaminophen     Tablet .. 650 milliGRAM(s) Oral every 6 hours PRN Mild Pain (1 - 3), Moderate Pain (4 - 6)  ondansetron Injectable 4 milliGRAM(s) IV Push every 6 hours PRN Nausea and/or Vomiting  sodium chloride 0.65% Nasal 1 Spray(s) Both Nostrils two times a day PRN Nasal Congestion  zolpidem 5 milliGRAM(s) Oral at bedtime PRN Insomnia  zolpidem 5 milliGRAM(s) Oral at bedtime PRN Insomnia      I&O's Summary    11 Jun 2023 07:01  -  12 Jun 2023 07:00  --------------------------------------------------------  IN: 1400 mL / OUT: 0 mL / NET: 1400 mL        PHYSICAL EXAM:  Vital Signs Last 24 Hrs  T(C): 36.3 (12 Jun 2023 06:49), Max: 37.1 (11 Jun 2023 21:50)  T(F): 97.4 (12 Jun 2023 06:49), Max: 98.8 (11 Jun 2023 21:50)  HR: 82 (12 Jun 2023 10:00) (70 - 100)  BP: 152/68 (12 Jun 2023 10:00) (152/68 - 190/70)  BP(mean): --  RR: 18 (12 Jun 2023 06:49) (17 - 18)  SpO2: 95% (12 Jun 2023 06:49) (94% - 95%)    Parameters below as of 12 Jun 2023 06:49  Patient On (Oxygen Delivery Method): room air      CONSTITUTIONAL: NAD, well-developed, well-groomed  EYES: PERRLA; conjunctiva and sclera clear  ENMT: Moist oral mucosa, no pharyngeal injection or exudates; normal dentition  NECK: Supple, no palpable masses; no thyromegaly  RESPIRATORY: Normal respiratory effort; lungs are clear to auscultation bilaterally  CARDIOVASCULAR: Regular rate and rhythm, normal S1 and S2, no murmur/rub/gallop; No lower extremity edema; Peripheral pulses are 2+ bilaterally  ABDOMEN: Nontender to palpation, normoactive bowel sounds, no rebound/guarding; No hepatosplenomegaly  MUSCULOSKELETAL:   no clubbing or cyanosis of digits; no joint swelling or tenderness to palpation  PSYCH: A+O to person, place, and time; affect appropriate  NEUROLOGY: CN 2-12 are intact and symmetric; no gross sensory deficits   SKIN: No rashes; RLE lateral ankle wound    LABS:                        8.1    10.60 )-----------( 616      ( 12 Jun 2023 07:00 )             24.9     06-12    135  |  103  |  16  ----------------------------<  116<H>  4.4   |  19<L>  |  1.45<H>    Ca    8.7      12 Jun 2023 07:00  Phos  3.3     06-12  Mg     2.10     06-12                COVID-19 PCR: NotDetec (21 Dec 2022 19:36)  COVID-19 PCR: NotDetec (19 Dec 2022 08:19)      RADIOLOGY & ADDITIONAL TESTS:  New Results Reviewed Today:   New Imaging Personally Reviewed Today:  New Electrocardiogram Personally Reviewed Today:  Prior or Outpatient Records Reviewed Today:    COMMUNICATION:  Care Discussed with Consultants/Other Providers and Details of Discussion: d/w medicine PA  Discussions with Patient/Family: d/w patient, d/w daughter Lili at bedside    
Mario Alberto Ma MD  Academic Hospitalist  Pager [phone]  Email: [email or web address]  Available on Microsoft Teams        PROGRESS NOTE:     Patient is a 89y old  Female who presents with a chief complaint of Cellulitis of right lower extremity     (11 Jun 2023 09:29)      SUBJECTIVE / OVERNIGHT EVENTS:  Patient seen and examined this morning. Less pain in the right lower extremity. Denies any further n/v diarrhea.  ADDITIONAL REVIEW OF SYSTEMS:  No f/c/n/v    MEDICATIONS  (STANDING):  atenolol  Tablet 50 milliGRAM(s) Oral two times a day  chlorhexidine 2% Cloths 1 Application(s) Topical daily  clindamycin IVPB 600 milliGRAM(s) IV Intermittent every 8 hours  heparin   Injectable 5000 Unit(s) SubCutaneous every 12 hours  lidocaine   4% Patch 1 Patch Transdermal every 24 hours  mupirocin 2% Ointment 1 Application(s) Topical two times a day  oxybutynin 5 milliGRAM(s) Oral daily  pantoprazole    Tablet 40 milliGRAM(s) Oral before breakfast  sodium chloride 0.9%. 1000 milliLiter(s) (50 mL/Hr) IV Continuous <Continuous>    MEDICATIONS  (PRN):  acetaminophen     Tablet .. 650 milliGRAM(s) Oral every 6 hours PRN Mild Pain (1 - 3), Moderate Pain (4 - 6)  ondansetron Injectable 4 milliGRAM(s) IV Push every 6 hours PRN Nausea and/or Vomiting  zolpidem 5 milliGRAM(s) Oral at bedtime PRN Insomnia  zolpidem 5 milliGRAM(s) Oral at bedtime PRN Insomnia      CAPILLARY BLOOD GLUCOSE        I&O's Summary      PHYSICAL EXAM:  Vital Signs Last 24 Hrs  T(C): 37.1 (11 Jun 2023 05:15), Max: 37.1 (11 Jun 2023 05:15)  T(F): 98.7 (11 Jun 2023 05:15), Max: 98.7 (11 Jun 2023 05:15)  HR: 68 (11 Jun 2023 05:15) (58 - 71)  BP: 180/76 (11 Jun 2023 05:15) (143/66 - 180/76)  BP(mean): --  RR: 18 (11 Jun 2023 05:15) (17 - 18)  SpO2: 95% (11 Jun 2023 05:15) (92% - 97%)    Parameters below as of 11 Jun 2023 05:15  Patient On (Oxygen Delivery Method): room air        Physical Exam: GENERAL: NAD  CHEST/LUNG: Clear to auscultation bilaterally; No wheeze, rhonchi, crackles or rales  HEART: Regular rate and rhythm; No murmurs, rubs, or gallops  ABDOMEN: Soft, Nontender, Nondistended; Bowel sounds present  EXTREMITIES:  2+ Peripheral Pulses, No edema  PSYCH: AAOx3  NEUROLOGY: non-focal  SKIN: +RLE wound w/ surrounding erythema (mid-shin) and edema, no drainage    LABS:                        8.4    11.65 )-----------( 566      ( 11 Jun 2023 05:40 )             26.2     06-11    134<L>  |  102  |  21  ----------------------------<  109<H>  4.4   |  18<L>  |  1.57<H>    Ca    8.5      11 Jun 2023 05:40  Phos  3.9     06-11  Mg     1.50     06-11    TPro  5.5<L>  /  Alb  3.1<L>  /  TBili  <0.2  /  DBili  x   /  AST  12  /  ALT  7   /  AlkPhos  62  06-10                RADIOLOGY & ADDITIONAL TESTS:  Results Reviewed:   Imaging Personally Reviewed:  Electrocardiogram Personally Reviewed:    COORDINATION OF CARE:  Care Discussed with Consultants/Other Providers [Y/N]:  Prior or Outpatient Records Reviewed [Y/N]:

## 2023-09-14 NOTE — DISCHARGE NOTE NURSING/CASE MANAGEMENT/SOCIAL WORK - NSDCPNDISPN_GEN_ALL_CORE
Detail Level: Generalized
Detail Level: Detailed
Detail Level: Zone
Body Location Override (Optional - Billing Will Still Be Based On Selected Body Map Location If Applicable): lateral malar cheek
Detail Level: Simple
Size Of Lesion In Cm (Optional): 0
X Size Of Lesion In Cm (Optional): 0.2
Education provided on the pain management plan of care/Side effects of pain management treatment/Activities of daily living, including home environment that might     exacerbate pain or reduce effectiveness of the pain management plan of care as well as strategies to address these issues/Opioids not applicable/not prescribed

## 2024-01-01 NOTE — ASU PREOP CHECKLIST - NSBLOODTRANS_GEN_A_CORE_SIUH
Check here if all serologies below were negative, non-reactive or immune. Otherwise select appropriate status.
no...

## 2024-05-24 ENCOUNTER — INPATIENT (INPATIENT)
Facility: HOSPITAL | Age: 89
LOS: 4 days | Discharge: NURSING, REHAB OR LONG-TERM CARE | End: 2024-05-29
Attending: ORTHOPAEDIC SURGERY | Admitting: ORTHOPAEDIC SURGERY
Payer: MEDICARE

## 2024-05-24 ENCOUNTER — TRANSCRIPTION ENCOUNTER (OUTPATIENT)
Age: 89
End: 2024-05-24

## 2024-05-24 VITALS
TEMPERATURE: 97 F | HEART RATE: 87 BPM | RESPIRATION RATE: 16 BRPM | OXYGEN SATURATION: 94 % | SYSTOLIC BLOOD PRESSURE: 217 MMHG | DIASTOLIC BLOOD PRESSURE: 89 MMHG

## 2024-05-24 DIAGNOSIS — Z98.890 OTHER SPECIFIED POSTPROCEDURAL STATES: Chronic | ICD-10-CM

## 2024-05-24 DIAGNOSIS — Z90.89 ACQUIRED ABSENCE OF OTHER ORGANS: Chronic | ICD-10-CM

## 2024-05-24 DIAGNOSIS — S72.001A FRACTURE OF UNSPECIFIED PART OF NECK OF RIGHT FEMUR, INITIAL ENCOUNTER FOR CLOSED FRACTURE: ICD-10-CM

## 2024-05-24 LAB
ALBUMIN SERPL ELPH-MCNC: 4.2 G/DL — SIGNIFICANT CHANGE UP (ref 3.3–5)
ALP SERPL-CCNC: 85 U/L — SIGNIFICANT CHANGE UP (ref 40–120)
ALT FLD-CCNC: 8 U/L — SIGNIFICANT CHANGE UP (ref 4–33)
ANION GAP SERPL CALC-SCNC: 16 MMOL/L — HIGH (ref 7–14)
APPEARANCE UR: CLEAR — SIGNIFICANT CHANGE UP
APTT BLD: 28.7 SEC — SIGNIFICANT CHANGE UP (ref 24.5–35.6)
AST SERPL-CCNC: 18 U/L — SIGNIFICANT CHANGE UP (ref 4–32)
BACTERIA # UR AUTO: NEGATIVE /HPF — SIGNIFICANT CHANGE UP
BASE EXCESS BLDV CALC-SCNC: -3.4 MMOL/L — LOW (ref -2–3)
BASOPHILS # BLD AUTO: 0.07 K/UL — SIGNIFICANT CHANGE UP (ref 0–0.2)
BASOPHILS NFR BLD AUTO: 0.3 % — SIGNIFICANT CHANGE UP (ref 0–2)
BILIRUB SERPL-MCNC: 0.2 MG/DL — SIGNIFICANT CHANGE UP (ref 0.2–1.2)
BILIRUB UR-MCNC: NEGATIVE — SIGNIFICANT CHANGE UP
BLD GP AB SCN SERPL QL: NEGATIVE — SIGNIFICANT CHANGE UP
BLOOD GAS VENOUS COMPREHENSIVE RESULT: SIGNIFICANT CHANGE UP
BUN SERPL-MCNC: 32 MG/DL — HIGH (ref 7–23)
CALCIUM SERPL-MCNC: 9.5 MG/DL — SIGNIFICANT CHANGE UP (ref 8.4–10.5)
CAST: 1 /LPF — SIGNIFICANT CHANGE UP (ref 0–4)
CHLORIDE BLDV-SCNC: 105 MMOL/L — SIGNIFICANT CHANGE UP (ref 96–108)
CHLORIDE SERPL-SCNC: 102 MMOL/L — SIGNIFICANT CHANGE UP (ref 98–107)
CO2 BLDV-SCNC: 23.4 MMOL/L — SIGNIFICANT CHANGE UP (ref 22–26)
CO2 SERPL-SCNC: 19 MMOL/L — LOW (ref 22–31)
COLOR SPEC: YELLOW — SIGNIFICANT CHANGE UP
CREAT SERPL-MCNC: 1.61 MG/DL — HIGH (ref 0.5–1.3)
DIFF PNL FLD: NEGATIVE — SIGNIFICANT CHANGE UP
EGFR: 29 ML/MIN/1.73M2 — LOW
EOSINOPHIL # BLD AUTO: 0.03 K/UL — SIGNIFICANT CHANGE UP (ref 0–0.5)
EOSINOPHIL NFR BLD AUTO: 0.1 % — SIGNIFICANT CHANGE UP (ref 0–6)
GAS PNL BLDV: 134 MMOL/L — LOW (ref 136–145)
GLUCOSE BLDV-MCNC: 158 MG/DL — HIGH (ref 70–99)
GLUCOSE SERPL-MCNC: 153 MG/DL — HIGH (ref 70–99)
GLUCOSE UR QL: NEGATIVE MG/DL — SIGNIFICANT CHANGE UP
HCO3 BLDV-SCNC: 22 MMOL/L — SIGNIFICANT CHANGE UP (ref 22–29)
HCT VFR BLD CALC: 24.1 % — LOW (ref 34.5–45)
HCT VFR BLDA CALC: 21 % — CRITICAL LOW (ref 34.5–46.5)
HGB BLD CALC-MCNC: 6.9 G/DL — CRITICAL LOW (ref 11.7–16.1)
HGB BLD-MCNC: 7.4 G/DL — LOW (ref 11.5–15.5)
IANC: 17.8 K/UL — HIGH (ref 1.8–7.4)
IMM GRANULOCYTES NFR BLD AUTO: 0.7 % — SIGNIFICANT CHANGE UP (ref 0–0.9)
INR BLD: 0.9 RATIO — SIGNIFICANT CHANGE UP (ref 0.85–1.18)
KETONES UR-MCNC: NEGATIVE MG/DL — SIGNIFICANT CHANGE UP
LACTATE BLDV-MCNC: 1.1 MMOL/L — SIGNIFICANT CHANGE UP (ref 0.5–2)
LEUKOCYTE ESTERASE UR-ACNC: NEGATIVE — SIGNIFICANT CHANGE UP
LYMPHOCYTES # BLD AUTO: 1.6 K/UL — SIGNIFICANT CHANGE UP (ref 1–3.3)
LYMPHOCYTES # BLD AUTO: 7.8 % — LOW (ref 13–44)
MCHC RBC-ENTMCNC: 25.7 PG — LOW (ref 27–34)
MCHC RBC-ENTMCNC: 30.7 GM/DL — LOW (ref 32–36)
MCV RBC AUTO: 83.7 FL — SIGNIFICANT CHANGE UP (ref 80–100)
MONOCYTES # BLD AUTO: 0.87 K/UL — SIGNIFICANT CHANGE UP (ref 0–0.9)
MONOCYTES NFR BLD AUTO: 4.2 % — SIGNIFICANT CHANGE UP (ref 2–14)
NEUTROPHILS # BLD AUTO: 17.8 K/UL — HIGH (ref 1.8–7.4)
NEUTROPHILS NFR BLD AUTO: 86.9 % — HIGH (ref 43–77)
NITRITE UR-MCNC: NEGATIVE — SIGNIFICANT CHANGE UP
NRBC # BLD: 0 /100 WBCS — SIGNIFICANT CHANGE UP (ref 0–0)
NRBC # FLD: 0 K/UL — SIGNIFICANT CHANGE UP (ref 0–0)
PCO2 BLDV: 41 MMHG — SIGNIFICANT CHANGE UP (ref 39–52)
PH BLDV: 7.34 — SIGNIFICANT CHANGE UP (ref 7.32–7.43)
PH UR: 7.5 — SIGNIFICANT CHANGE UP (ref 5–8)
PLATELET # BLD AUTO: 496 K/UL — HIGH (ref 150–400)
PO2 BLDV: 30 MMHG — SIGNIFICANT CHANGE UP (ref 25–45)
POTASSIUM BLDV-SCNC: 5.2 MMOL/L — HIGH (ref 3.5–5.1)
POTASSIUM SERPL-MCNC: 5 MMOL/L — SIGNIFICANT CHANGE UP (ref 3.5–5.3)
POTASSIUM SERPL-SCNC: 5 MMOL/L — SIGNIFICANT CHANGE UP (ref 3.5–5.3)
PROT SERPL-MCNC: 7.1 G/DL — SIGNIFICANT CHANGE UP (ref 6–8.3)
PROT UR-MCNC: 100 MG/DL
PROTHROM AB SERPL-ACNC: 10.2 SEC — SIGNIFICANT CHANGE UP (ref 9.5–13)
RBC # BLD: 2.88 M/UL — LOW (ref 3.8–5.2)
RBC # FLD: 14.6 % — HIGH (ref 10.3–14.5)
RBC CASTS # UR COMP ASSIST: 1 /HPF — SIGNIFICANT CHANGE UP (ref 0–4)
RH IG SCN BLD-IMP: POSITIVE — SIGNIFICANT CHANGE UP
SAO2 % BLDV: 48.3 % — LOW (ref 67–88)
SODIUM SERPL-SCNC: 137 MMOL/L — SIGNIFICANT CHANGE UP (ref 135–145)
SP GR SPEC: 1.01 — SIGNIFICANT CHANGE UP (ref 1–1.03)
SQUAMOUS # UR AUTO: 1 /HPF — SIGNIFICANT CHANGE UP (ref 0–5)
UROBILINOGEN FLD QL: 0.2 MG/DL — SIGNIFICANT CHANGE UP (ref 0.2–1)
WBC # BLD: 20.51 K/UL — HIGH (ref 3.8–10.5)
WBC # FLD AUTO: 20.51 K/UL — HIGH (ref 3.8–10.5)
WBC UR QL: 6 /HPF — HIGH (ref 0–5)

## 2024-05-24 PROCEDURE — 99285 EMERGENCY DEPT VISIT HI MDM: CPT

## 2024-05-24 PROCEDURE — 70450 CT HEAD/BRAIN W/O DYE: CPT | Mod: 26,MC

## 2024-05-24 PROCEDURE — 71045 X-RAY EXAM CHEST 1 VIEW: CPT | Mod: 26

## 2024-05-24 PROCEDURE — 73562 X-RAY EXAM OF KNEE 3: CPT | Mod: 26,RT

## 2024-05-24 PROCEDURE — 73552 X-RAY EXAM OF FEMUR 2/>: CPT | Mod: 26,RT

## 2024-05-24 PROCEDURE — 73502 X-RAY EXAM HIP UNI 2-3 VIEWS: CPT | Mod: 26,RT

## 2024-05-24 PROCEDURE — 99223 1ST HOSP IP/OBS HIGH 75: CPT

## 2024-05-24 PROCEDURE — 72125 CT NECK SPINE W/O DYE: CPT | Mod: 26,MC

## 2024-05-24 PROCEDURE — 72170 X-RAY EXAM OF PELVIS: CPT | Mod: 26,59

## 2024-05-24 PROCEDURE — 73130 X-RAY EXAM OF HAND: CPT | Mod: 26,RT

## 2024-05-24 RX ORDER — HYDRALAZINE HCL 50 MG
2.5 TABLET ORAL EVERY 8 HOURS
Refills: 0 | Status: DISCONTINUED | OUTPATIENT
Start: 2024-05-24 | End: 2024-05-26

## 2024-05-24 RX ORDER — HYDRALAZINE HCL 50 MG
25 TABLET ORAL EVERY 8 HOURS
Refills: 0 | Status: DISCONTINUED | OUTPATIENT
Start: 2024-05-24 | End: 2024-05-26

## 2024-05-24 RX ORDER — ATENOLOL 25 MG/1
50 TABLET ORAL
Refills: 0 | Status: DISCONTINUED | OUTPATIENT
Start: 2024-05-24 | End: 2024-05-24

## 2024-05-24 RX ORDER — MORPHINE SULFATE 50 MG/1
4 CAPSULE, EXTENDED RELEASE ORAL ONCE
Refills: 0 | Status: DISCONTINUED | OUTPATIENT
Start: 2024-05-24 | End: 2024-05-24

## 2024-05-24 RX ORDER — OXYCODONE HYDROCHLORIDE 5 MG/1
5 TABLET ORAL EVERY 4 HOURS
Refills: 0 | Status: DISCONTINUED | OUTPATIENT
Start: 2024-05-24 | End: 2024-05-25

## 2024-05-24 RX ORDER — ONDANSETRON 8 MG/1
4 TABLET, FILM COATED ORAL ONCE
Refills: 0 | Status: COMPLETED | OUTPATIENT
Start: 2024-05-24 | End: 2024-05-24

## 2024-05-24 RX ORDER — SODIUM CHLORIDE 9 MG/ML
1000 INJECTION INTRAMUSCULAR; INTRAVENOUS; SUBCUTANEOUS ONCE
Refills: 0 | Status: DISCONTINUED | OUTPATIENT
Start: 2024-05-24 | End: 2024-05-24

## 2024-05-24 RX ORDER — OXYBUTYNIN CHLORIDE 5 MG
5 TABLET ORAL AT BEDTIME
Refills: 0 | Status: DISCONTINUED | OUTPATIENT
Start: 2024-05-24 | End: 2024-05-24

## 2024-05-24 RX ORDER — OXYCODONE HYDROCHLORIDE 5 MG/1
2.5 TABLET ORAL EVERY 4 HOURS
Refills: 0 | Status: DISCONTINUED | OUTPATIENT
Start: 2024-05-24 | End: 2024-05-29

## 2024-05-24 RX ORDER — HYDRALAZINE HCL 50 MG
2.5 TABLET ORAL EVERY 8 HOURS
Refills: 0 | Status: DISCONTINUED | OUTPATIENT
Start: 2024-05-24 | End: 2024-05-24

## 2024-05-24 RX ORDER — ATENOLOL 25 MG/1
50 TABLET ORAL DAILY
Refills: 0 | Status: DISCONTINUED | OUTPATIENT
Start: 2024-05-24 | End: 2024-05-25

## 2024-05-24 RX ORDER — ACETAMINOPHEN 500 MG
975 TABLET ORAL EVERY 8 HOURS
Refills: 0 | Status: DISCONTINUED | OUTPATIENT
Start: 2024-05-24 | End: 2024-05-29

## 2024-05-24 RX ORDER — MAGNESIUM HYDROXIDE 400 MG/1
30 TABLET, CHEWABLE ORAL DAILY
Refills: 0 | Status: DISCONTINUED | OUTPATIENT
Start: 2024-05-24 | End: 2024-05-29

## 2024-05-24 RX ORDER — POVIDONE-IODINE 5 %
1 AEROSOL (ML) TOPICAL ONCE
Refills: 0 | Status: DISCONTINUED | OUTPATIENT
Start: 2024-05-24 | End: 2024-05-29

## 2024-05-24 RX ORDER — SENNA PLUS 8.6 MG/1
2 TABLET ORAL AT BEDTIME
Refills: 0 | Status: DISCONTINUED | OUTPATIENT
Start: 2024-05-24 | End: 2024-05-29

## 2024-05-24 RX ORDER — CHLORHEXIDINE GLUCONATE 213 G/1000ML
1 SOLUTION TOPICAL
Refills: 0 | Status: COMPLETED | OUTPATIENT
Start: 2024-05-24 | End: 2024-05-28

## 2024-05-24 RX ORDER — SODIUM CHLORIDE 9 MG/ML
1000 INJECTION INTRAMUSCULAR; INTRAVENOUS; SUBCUTANEOUS
Refills: 0 | Status: DISCONTINUED | OUTPATIENT
Start: 2024-05-24 | End: 2024-05-25

## 2024-05-24 RX ORDER — ZOLPIDEM TARTRATE 10 MG/1
5 TABLET ORAL AT BEDTIME
Refills: 0 | Status: DISCONTINUED | OUTPATIENT
Start: 2024-05-24 | End: 2024-05-29

## 2024-05-24 RX ORDER — LOSARTAN POTASSIUM 100 MG/1
100 TABLET, FILM COATED ORAL DAILY
Refills: 0 | Status: DISCONTINUED | OUTPATIENT
Start: 2024-05-24 | End: 2024-05-24

## 2024-05-24 RX ADMIN — Medication 25 MILLIGRAM(S): at 23:31

## 2024-05-24 RX ADMIN — MORPHINE SULFATE 4 MILLIGRAM(S): 50 CAPSULE, EXTENDED RELEASE ORAL at 17:47

## 2024-05-24 RX ADMIN — SENNA PLUS 2 TABLET(S): 8.6 TABLET ORAL at 23:31

## 2024-05-24 RX ADMIN — Medication 975 MILLIGRAM(S): at 23:31

## 2024-05-24 NOTE — ED ADULT NURSE REASSESSMENT NOTE - NS ED NURSE REASSESS COMMENT FT1
pt respirations even and unlabored. pt denies headaches, dizziness, n/v/d, abdominal pain, SOB. chest pain, or fever like symptoms. pt plan of care ongoing.

## 2024-05-24 NOTE — CONSULT NOTE ADULT - PROBLEM SELECTOR RECOMMENDATION 8
- Medications currently reconciled as per [facility] [employer], Med hx pharmacist emailed for her medication reconciliation - Incidentally noted to have an age indeterminant L 6th rib fracture  - Patient does report L back pain but prolonged for the past few weeks to months, worsening recently  - Does also have crackles of the L lower lung on auscultation, suspect likely atelectasis as her CXR did not show focal opacities  - Her L back pain could be related to the L 6th rib fracture -> pain control with oxy IR as per orthopedics, also ordered incentive spirometer for her suspected atelectasis  - Rib fracture unlikely to be related to her fall today as it is age indeterminant and patient also states she fell to her R side not the L side, likely suffered this injury previously from an unspecified mechanism

## 2024-05-24 NOTE — ED ADULT TRIAGE NOTE - CHIEF COMPLAINT QUOTE
As per EMT " Witness fall hit rt side of head and rt hip, no LOC not on blood thinners ." Pt axo4. Pt st" I got up from picnic table tried to salute flag not sure if I tripped on the chair leg...I  just went down....did not black out...my rt hip hurts the most." Denies feeling dizzy, lighthead denies chest pain or shortness of breath prior to falling or after.  Hx HTN uses walker. + rt leg appears externally rotated and shorter than left.

## 2024-05-24 NOTE — CONSULT NOTE ADULT - PROBLEM SELECTOR RECOMMENDATION 9
DVT ppx - as per orthopedics - Acute R hip fracture -> pain control and management as per orthopedics - Medications currently reconciled as per [facility] [employer], Med hx pharmacist emailed for her medication reconciliation

## 2024-05-24 NOTE — PATIENT PROFILE ADULT - FALL HARM RISK - HARM RISK INTERVENTIONS

## 2024-05-24 NOTE — ED ADULT NURSE NOTE - OBJECTIVE STATEMENT
Pt. AOx4 ambulatory w/ assist at baseline presents to ED s/p mechanical fall at 1330 today. Pt. states "I tripped over the leg of the chair or table and hit my head on concrete." Pt. DENIES LOC, use of anticoagulants, dizziness, loss of vision, neck pain, c-spine pain. RLE externally rotated on arrival, + ecchymosis on R. thumb. Pt reports 4/10 R. Hip pain at rest. Skin intact, son at bedside. Labs drawn and sent   IV access: L.AC 20G  Pending: Lab and radio results

## 2024-05-24 NOTE — CONSULT NOTE ADULT - SUBJECTIVE AND OBJECTIVE BOX
CHIEF COMPLAINT  R hip fracture    HPI  This is a 89F with history of HTN, HLD, OA c/b peripheral neuropathy of her hands and feet x years, and Overactive Bladder who presents to the hospital with R hip pain after a fall. Said that she was at a barbecue this afternoon when she fell. Said that it happened when she stood up from her chair, thinks her leg tripped on the chair or table leg and she subsequently fell to the R side. Had immediate pain to the R hip and was brought to the hospital. Denied having any dizziness, chest pain, palpitations, or SOB prior to her fall. No LOC with her fall. At baseline she lives at an assisted living facility, ambulates with a walker. States that she can ambulate for multiple blocks without chest pain, palpitations, or SOB. Denies chest pain, palpitations, or SOB when walking down stairs. Denies history of DM, CAD, CVA, or CHF.     With regards to her blood pressures, states that her BPs have chronically been poorly controlled, states that she was recently at an OSH for a different reason and said that there they told her her BP was "close to 300." Patient not sure of what medications she takes for her blood pressure as they are given to her by her assisted living. Denies HA, chest pain, or SOB associated with poorly controlled HTN.     Allergies  sulfa drugs (Fever)    Intolerances  Keflex (Vomiting; Nausea)  Demerol HCl (Other)      HOME MEDICATIONS: [ X ] Reviewed - Oxybutynin ER 5mg, HCTZ 25mg, Irbesartan 300mg, Eszopiclone 3mg, Atenolol 50mg, Senna 1T    MEDICATIONS  (STANDING):  acetaminophen     Tablet .. 975 milliGRAM(s) Oral every 8 hours  atenolol  Tablet 50 milliGRAM(s) Oral daily  chlorhexidine 2% Cloths 1 Application(s) Topical <User Schedule>  hydrALAZINE 25 milliGRAM(s) Oral every 8 hours  ondansetron Injectable 4 milliGRAM(s) IV Push once  povidone iodine 5% Nasal Swab 1 Application(s) Both Nostrils once  senna 2 Tablet(s) Oral at bedtime  sodium chloride 0.9%. 1000 milliLiter(s) (125 mL/Hr) IV Continuous <Continuous>    MEDICATIONS  (PRN):  hydrALAZINE Injectable 2.5 milliGRAM(s) IV Push every 8 hours PRN For SBP > 180  magnesium hydroxide Suspension 30 milliLiter(s) Oral daily PRN Constipation  oxyCODONE    IR 2.5 milliGRAM(s) Oral every 4 hours PRN Moderate Pain (4 - 6)  oxyCODONE    IR 5 milliGRAM(s) Oral every 4 hours PRN Severe Pain (7 - 10)  zolpidem 5 milliGRAM(s) Oral at bedtime PRN Insomnia      PAST MEDICAL & SURGICAL HISTORY:  HLD (hyperlipidemia)  HTN (hypertension)  Carpal tunnel syndrome, left upper limb  Muscle cramp  Trigger finger, left ring finger  Trigger finger of left thumb  Neuropathy  Environmental allergies  Arthritis  generalized, all joints      History of tonsillectomy  History of conization of cervix        [ X ] Reviewed     SOCIAL HISTORY:  Lives in assisted living  Ambulatory with walker  Former tobacco user, quit ~50 years ago  Denies EtOH or illicit substance use    FAMILY HISTORY:  Family history of lymphoma (Child)  Family history of colon cancer in mother (Mother)      REVIEW OF SYSTEMS:  CONSTITUTIONAL: No weakness, fevers or chills  EYES: No visual changes or eye discharge  ENT: No rhinorrhea or sore throat  NECK: No pain or stiffness  RESPIRATORY: No cough, wheezing, hemoptysis; No shortness of breath  CARDIOVASCULAR: No chest pain or palpitations; No lower extremity edema  GASTROINTESTINAL: No abdominal or epigastric pain. No nausea, vomiting, or hematemesis; No diarrhea or constipation. No melena or hematochezia.  MSK: No back pain, +R hip pain that worsens with movement  GENITOURINARY: No dysuria, frequency or hematuria  NEUROLOGICAL: No new focal numbness or weakness  SKIN: No itching, burning, rashes, or lesions     PHYSICAL EXAM:  Vital Signs Last 24 Hrs  T(C): 36.6 (24 May 2024 22:25), Max: 36.7 (24 May 2024 19:19)  T(F): 97.9 (24 May 2024 22:25), Max: 98 (24 May 2024 19:19)  HR: 81 (24 May 2024 22:25) (81 - 95)  BP: 212/76 (24 May 2024 22:25) (167/80 - 222/97)  BP(mean): --  RR: 16 (24 May 2024 22:25) (16 - 18)  SpO2: 99% (24 May 2024 22:25) (94% - 99%)    Parameters below as of 24 May 2024 22:25  Patient On (Oxygen Delivery Method): room air    PHYSICAL EXAM:  GENERAL: No acute distress, well-developed  ENT: EOMI, PERRL, conjunctiva and sclera clear, Neck supple, No JVD, dry oral mucosa  CHEST/LUNG: +LLL crackles and mild TTP of the L post ribs, no wheezing, equal breath sounds bilaterally   BACK: No spinal tenderness  HEART: Regular rate and rhythm; No murmurs, rubs, or gallops  ABDOMEN: Soft, Nontender, Nondistended; Bowel sounds present, +distended bladder  EXTREMITIES: +DP/PT/Radial pulses b/l, No clubbing, cyanosis, or edema  PSYCH: Nl behavior, nl affect  NEUROLOGY: AAOx3, non-focal  SKIN: Ecchymosis of the R thumb, otherwise normal color, No rashes or lesions        137  |  102  |  32<H>  ----------------------------<  153<H>  5.0   |  19<L>  |  1.61<H>    Ca    9.5      24 May 2024 17:42    TPro  7.1  /  Alb  4.2  /  TBili  0.2  /  DBili  x   /  AST  18  /  ALT  8   /  AlkPhos  85                          7.4    20.51 )-----------( 496      ( 24 May 2024 17:42 )             24.1     PT/INR - ( 24 May 2024 17:42 )   PT: 10.2 sec;   INR: 0.90 ratio    PTT - ( 24 May 2024 17:42 )  PTT:28.7 sec    Urinalysis Basic - ( 24 May 2024 17:55 )  Color: Yellow / Appearance: Clear / S.013 / pH: x  Gluc: x / Ketone: Negative mg/dL  / Bili: Negative / Urobili: 0.2 mg/dL   Blood: x / Protein: 100 mg/dL / Nitrite: Negative   Leuk Esterase: Negative / RBC: 1 /HPF / WBC 6 /HPF   Sq Epi: x / Non Sq Epi: 1 /HPF / Bacteria: Negative /HPF    Lactate 1.1    RADIOLOGY & ADDITIONAL STUDIES:    EKG: Sinus rhythm with sinus arrythmia, rate 75, QTc 446, no significant ST-T wave changes    IMAGES:  < from: CT Head No Cont (24 @ 18:43) >  IMPRESSION:  CT HEAD:  No acute intracranial hemorrhage or depressed calvarial fracture.    CT CERVICAL SPINE:  No acute fracture or traumatic subluxation.  Multi-level degenerative changes.  --- End of Report ---  < end of copied text >    < from: Xray Chest 1 View AP/PA (24 @ 20:50) >  ******PRELIMINARY REPORT******    FINDINGS:  Elevated right hemidiaphragm.  No focal consolidation. No pleural effusion. No pneumothorax.  Cardiac silhouette size cannot be accurately assessed on this projection.  Left sixth rib fracture, age indeterminant.    IMPRESSION:  Left sixth rib fracture, age indeterminant.  No pneumothorax.  No focal consolidation.  < end of copied text >    < from: Xray Hip 2-3 views/Pelvis AP only (24 @ 18:43) >  IMPRESSION:  There is a displaced intratrochanteric fracture with mild impaction and varus angulation. There is no hip dislocation.  Vascular calcification.  --- End of Report ---  < end of copied text >    < from: Xray Hand 3 Views, Right (24 @ 18:44) >  IMPRESSION:  No acute fracture.  Extensive osteoarthritic changes of the hand with a mild erosive component noted at the IP joints  Moderate soft tissue swelling about the thumb.  --- End of Report ---  < end of copied text >    < from: Xray Knee 3 Views, Right (24 @ 20:50) >  IMPRESSION:  No acute displaced fracture or dislocation.  --- End of Report ---  < end of copied text >

## 2024-05-24 NOTE — CONSULT NOTE ADULT - PROBLEM SELECTOR RECOMMENDATION 2
- Patient's RCRI score is 0 placing her at a low risk of MACE perioperatively, she is well compensated for her chronic medical conditions and does not report any cardiac or respiratory complaints with her ADLs and seems to exhibit at least good METs at baseline  - Currently no additional cardiac testing indicated prior to her planned procedure

## 2024-05-24 NOTE — ED PROVIDER NOTE - PROGRESS NOTE DETAILS
Pain is 5 out of 10 after morphine.  Patient's blood pressure slightly improved.  Initially patient was thought to be febrile but repeat vitals shows no fever.  Patient however does have white count of 20 and hence blood cultures were sent.  Hemoglobin is 7.4 and unsure of baseline.  For this reason orthopedics has requested patient get 1 unit of blood while in ED and then be admitted to the orthopedic service.  X-ray of the hand shows no fracture and CT head is unremarkable.

## 2024-05-24 NOTE — CONSULT NOTE ADULT - PROBLEM SELECTOR RECOMMENDATION 5
- Baseline Cr seems to be 1.1 - 1.2, had DIMITRI 2/2 diarrhea on last admission in 2023, Cr had improved to 1.28 by discharge  - Seems to be CKD3 at baseline  - Here Cr elevated to 1.61 likely in setting of urinary retention, likely poor hydration (pt with dry oral mucosa on examination), and poorly controlled HTN  - Urine studies ordered for further evaluation  - Monitor BUN/Cr, can consider a gentle IVF challenge if her BUN/Cr is not improving on repeat labs  - UA with some protein (chronic) but no signs of infection - Baseline Cr seems to be 1.1 - 1.2, had DIMITRI 2/2 diarrhea on last admission in 2023, Cr had improved to 1.28 by discharge  - Seems to be CKD3 at baseline  - Here Cr elevated to 1.61 likely in setting of urinary retention, likely poor hydration (pt with dry oral mucosa on examination), and poorly controlled HTN  - Urine studies ordered for further evaluation  - Monitor BUN/Cr, can consider a gentle IVF challenge if her BUN/Cr is not improving on repeat labs  - UA with some protein (chronic) but no signs of infection  - Lactate 1.1

## 2024-05-24 NOTE — ED PROVIDER NOTE - CLINICAL SUMMARY MEDICAL DECISION MAKING FREE TEXT BOX
89-year-old female status post mechanical fall with right hip pain.  Given the significant pain patient is in this may be a distracting injury and will also get CT head due to head trauma.  Patient not on blood thinners.  X-rays also ordered of the right hand and right leg as per Ortho.  Will give pain medication and reassess.  Patient's blood pressure is notably elevated possibly due to pain and as per family patient also has elevated blood pressure normally.  Will reassess after.  Patient is also been preop.

## 2024-05-24 NOTE — CONSULT NOTE ADULT - ASSESSMENT
This is a 89F with history of HTN, HLD, OA, and OAB who presents to the hospital after a fall found to have a R hip fracture.

## 2024-05-24 NOTE — CONSULT NOTE ADULT - PROBLEM SELECTOR RECOMMENDATION 3
- Pt with poorly controlled BP at baseline, reports having BPs "up to 300" though unlikely to be true, on review of her last admission in 2023 she was noted to have poorly controlled BPs ranging from 140s/50s to 180s/70s  - Patient on 3 BP medications as per [facility] but she herself does not know the medications, states that her assisted living is in charge of giving her the medications  - Patient also reports having white coat HTN  - BPs elevated to 196/75 despite adequate pain control  - Patient also with DIMITRI complicating her medication regimen  - Currently will c/w atenolol 50mg BID (as per prior dosing regimen from the discharge in 2023), start hydralazine 25mg oral TID, prn hydralazine 2.5mg IVP for SBP > 180  - Monitor BPs, goal is to try to decrease the SBP to ~160s

## 2024-05-24 NOTE — ED ADULT NURSE NOTE - NSFALLHARMRISKINTERV_ED_ALL_ED
Assistance OOB with selected safe patient handling equipment if applicable/Communicate risk of Fall with Harm to all staff, patient, and family/Monitor gait and stability/Provide patient with walking aids/Provide visual cue: red socks, yellow wristband, yellow gown, etc/Reinforce activity limits and safety measures with patient and family/Bed in lowest position, wheels locked, appropriate side rails in place/Call bell, personal items and telephone in reach/Instruct patient to call for assistance before getting out of bed/chair/stretcher/Non-slip footwear applied when patient is off stretcher/[place] to call system/Physically safe environment - no spills, clutter or unnecessary equipment/Purposeful Proactive Rounding/Room/bathroom lighting operational, light cord in reach

## 2024-05-24 NOTE — H&P ADULT - HISTORY OF PRESENT ILLNESS
HPI  89yFemale w/ PMH of HTN and HLD  c/o R hip pain s/p mechanical fall earlier this evening when standing up to salute a flag. Unable to bear weight in the RLE since the fall. Denies headstrike or LOC. Denies numbness/tingling in the RLE. Denies any other trauma/injuries at this time. At baseline, community ambulator w RW.    ROS  Negative unless otherwise specified in HPI.    PAST MEDICAL & SURGICAL Hx  PAST MEDICAL & SURGICAL HISTORY:  HLD (hyperlipidemia)      HTN (hypertension)      Carpal tunnel syndrome, left upper limb      Muscle cramp      Trigger finger, left ring finger      Trigger finger of left thumb      Neuropathy      Environmental allergies      Arthritis  generalized, all joints      History of tonsillectomy      History of conization of cervix  1980          MEDICATIONS  Home Medications:  acetaminophen 325 mg oral capsule: 2 cap(s) orally every 6 hours (09 Jun 2023 15:32)  atenolol 50 mg oral tablet: 1 tab(s) orally 2 times a day (09 Jun 2023 15:32)  Avapro 300 mg oral tablet: 1 tab(s) orally once a day (09 Jun 2023 15:32)  clindamycin 300 mg oral capsule: 1 cap(s) orally every 6 hours (13 Jun 2023 15:07)  Ditropan XL 5 mg/24 hours oral tablet, extended release: 1 tab(s) orally once a day at bedtime (09 Jun 2023 15:27)  eszopiclone 3 mg oral tablet: 1 tab(s) orally once a day (09 Jun 2023 15:32)  lidocaine 4% topical film: Apply topically to affected area once a day (09 Jun 2023 15:27)  pantoprazole 40 mg oral delayed release tablet: 1 tab(s) orally once a day (before a meal) (09 Jun 2023 15:27)  polyethylene glycol 3350 oral powder for reconstitution: 17 gram(s) orally once a day (09 Jun 2023 15:27)  senna leaf extract oral tablet: 2 tab(s) orally once a day (at bedtime) (09 Jun 2023 15:27)  Vitamin B-12 1000 mcg oral tablet: 1 tab(s) orally once a day (09 Jun 2023 15:32)  Vitamin D3: 1000 unit(s) orally once a day (09 Jun 2023 15:27)      ALLERGIES  Keflex (Vomiting; Nausea)  sulfa drugs (Fever)  Demerol HCl (Other)      FAMILY Hx  FAMILY HISTORY:  Family history of lymphoma (Child)    Family history of colon cancer in mother (Mother)        SOCIAL Hx  Social History:      VITALS  Vital Signs Last 24 Hrs  T(C): 36.7 (24 May 2024 19:19), Max: 36.7 (24 May 2024 19:19)  T(F): 98 (24 May 2024 19:19), Max: 98 (24 May 2024 19:19)  HR: 94 (24 May 2024 19:19) (87 - 95)  BP: 207/82 (24 May 2024 19:19) (167/80 - 222/97)  BP(mean): --  RR: 18 (24 May 2024 19:19) (16 - 18)  SpO2: 96% (24 May 2024 19:19) (94% - 98%)    Parameters below as of 24 May 2024 17:59  Patient On (Oxygen Delivery Method): room air        PHYSICAL EXAM  Gen: Lying in bed, NAD  Resp: No increased WOB  RLE:  Skin intact, shortened and externally rotated, +edema and +ecchymosis over R hip  +TTP over R hip, no TTP along remainder of extremity; compartments soft  Limited ROM at hip 2/2 pain  +Log roll test  +Pain with axial loading  Motor: TA/EHL/GS/FHL intact  Sensory: DP/SP/Tib/Jackie/Saph SILT  +DP pulse, WWP    Secondary survey:  No TTP along other extremities, SILT and compartments soft throughout    LABS                        7.4    20.51 )-----------( 496      ( 24 May 2024 17:42 )             24.1     05-24    137  |  102  |  32<H>  ----------------------------<  153<H>  5.0   |  19<L>  |  1.61<H>    Ca    9.5      24 May 2024 17:42    TPro  7.1  /  Alb  4.2  /  TBili  0.2  /  DBili  x   /  AST  18  /  ALT  8   /  AlkPhos  85  05-24    PT/INR - ( 24 May 2024 17:42 )   PT: 10.2 sec;   INR: 0.90 ratio         PTT - ( 24 May 2024 17:42 )  PTT:28.7 sec    IMAGING  XRs: R basicervical hip fracture    ASSESSMENT & PLAN  89yFemale w/ R IT fx.  -NWB RLE, bedrest  -OR tomorrow 5/25  -f/u preop: CBC, BMP, coags, T&S x2, CXR, EKG  -NPO past midnight, IVF  -hold chemical DVT ppx for OR; SCDs OK  -please document medical clearance ASAP for OR  -pain control  -ice/cold compress

## 2024-05-24 NOTE — CONSULT NOTE ADULT - PROBLEM SELECTOR RECOMMENDATION 4
- Patient reports feeling of full bladder despite urinating, noted to have a distended bladder on examination despite putting out ~800cc of urine  - Bladder scan shows patient retaining 386cc of urine -> placed on straight cath protocol  - Will hold her oxybutynin for now  - if repeatedly retains then would need cox catheter

## 2024-05-24 NOTE — ED ADULT NURSE REASSESSMENT NOTE - NS ED NURSE REASSESS COMMENT FT1
PRBC transfusion initiated. Pt educated on signs and symptoms of transfusion reaction to report to RN. No complaints of chest pain, headache, nausea, dizziness, vomiting  SOB, fever, or chills at this time.

## 2024-05-24 NOTE — ED PROVIDER NOTE - PHYSICAL EXAMINATION
Patient is normocephalic atraumatic and there is no midline spinal tenderness in cervical or thoracic area.  There is no pelvic instability.  There is a hematoma of the right thumb over the distal phalanx with some tenderness.  Patient has good range of motion at the DIP.  Right leg is shortened and externally rotated.  There is pain at the right hip to palpation.  There is no deformity at the knee and there is no tenderness to the knee but patient is unable to range due to pain at the hip.  Sensation is normal distally and pulses are present.  Left leg has full range of motion normal strength.

## 2024-05-24 NOTE — CONSULT NOTE ADULT - PROBLEM SELECTOR RECOMMENDATION 6
- Patient with chronic anemia with Hgb level ranging from 7.3 - 9.1 during her last admission in 2023  - Patient reports having 2 episodes of epistaxis over the past 6 months but none that required hospitalizations, otherwise denied having hemoptysis/hematemesis/hematochezia/melena/hematuria/other blood losses  - Unclear source of her chronic anemia, suspect in setting of her renal insufficiency (given her age she has CKD3 at baseline), doubtful to be related to her epistaxis  - Getting 1U pRBC transfusion prior to expected OR time, f/u repeat CBC, transfuse for Hgb goal as per orthopedics

## 2024-05-24 NOTE — CONSULT NOTE ADULT - PROBLEM SELECTOR RECOMMENDATION 7
- Incidentally noted to have an age indeterminant L 6th rib fracture  - Patient does report L back pain but prolonged for the past few weeks to months, worsening recently  - Does also have crackles of the L lower lung on auscultation, suspect likely atelectasis as her CXR did not show focal opacities  - Her L back pain could be related to the L 6th rib fracture -> pain control with oxy IR as per orthopedics, also ordered incentive spirometer for her suspected atelectasis  - Rib fracture unlikely to be related to her fall today as it is age indeterminant and patient also states she fell to her R side not the L side, likely suffered this injury previously from an unspecified mechanism - Patient with SIRS given her leukocytosis to 20k with HR > 90 in ED but she denies any acute infectious complaints (denies URI, denies cough/sputum, denies nausea/emesis/diarrhea, denies dysuria/hematuria), no infectious findings on exam  - Has LLL crackles but CXR without focal opacities, L costophrenic angle easily visualized -> low suspicion for PNA, likely has crackles 2/2 atelectasis and incentive spirometer ordered  - UA without findings of infection  - Likely SIRS positive 2/2 acute fracture and hospitalization, will trend WBC and HR for now, no convincing indication for BCx or UCx at present  - Monitor temps

## 2024-05-24 NOTE — ED PROVIDER NOTE - OBJECTIVE STATEMENT
89-year-old female with history of hypertension.  Patient presents ED with right hip pain after fall.  Patient states she got up to slip the flag when she tripped on either the picnic table and chair leg and fell onto her right side hitting her head and right hip and right hand.  Patient had immediate pain and required assistance.  Patient did not have any LOC.  Currently patient only complains of pain in right hip and denies any headache or neck pain.  Patient denies any chest pain or difficulty breathing.  Patient denies any numbness.  Patient has no previous history of hip fracture.  Patient takes atenolol, hydrochlorothiazide and an ARB for blood pressure.

## 2024-05-25 DIAGNOSIS — Z01.818 ENCOUNTER FOR OTHER PREPROCEDURAL EXAMINATION: ICD-10-CM

## 2024-05-25 DIAGNOSIS — R65.10 SYSTEMIC INFLAMMATORY RESPONSE SYNDROME (SIRS) OF NON-INFECTIOUS ORIGIN WITHOUT ACUTE ORGAN DYSFUNCTION: ICD-10-CM

## 2024-05-25 DIAGNOSIS — S22.39XA FRACTURE OF ONE RIB, UNSPECIFIED SIDE, INITIAL ENCOUNTER FOR CLOSED FRACTURE: ICD-10-CM

## 2024-05-25 DIAGNOSIS — S72.001A FRACTURE OF UNSPECIFIED PART OF NECK OF RIGHT FEMUR, INITIAL ENCOUNTER FOR CLOSED FRACTURE: ICD-10-CM

## 2024-05-25 DIAGNOSIS — I10 ESSENTIAL (PRIMARY) HYPERTENSION: ICD-10-CM

## 2024-05-25 DIAGNOSIS — Z29.9 ENCOUNTER FOR PROPHYLACTIC MEASURES, UNSPECIFIED: ICD-10-CM

## 2024-05-25 DIAGNOSIS — Z79.899 OTHER LONG TERM (CURRENT) DRUG THERAPY: ICD-10-CM

## 2024-05-25 DIAGNOSIS — D64.9 ANEMIA, UNSPECIFIED: ICD-10-CM

## 2024-05-25 DIAGNOSIS — N17.9 ACUTE KIDNEY FAILURE, UNSPECIFIED: ICD-10-CM

## 2024-05-25 DIAGNOSIS — R33.9 RETENTION OF URINE, UNSPECIFIED: ICD-10-CM

## 2024-05-25 LAB
ANION GAP SERPL CALC-SCNC: 16 MMOL/L — HIGH (ref 7–14)
ANION GAP SERPL CALC-SCNC: 16 MMOL/L — HIGH (ref 7–14)
APTT BLD: 30.7 SEC — SIGNIFICANT CHANGE UP (ref 24.5–35.6)
BASOPHILS # BLD AUTO: 0.04 K/UL — SIGNIFICANT CHANGE UP (ref 0–0.2)
BASOPHILS NFR BLD AUTO: 0.2 % — SIGNIFICANT CHANGE UP (ref 0–2)
BLD GP AB SCN SERPL QL: NEGATIVE — SIGNIFICANT CHANGE UP
BUN SERPL-MCNC: 30 MG/DL — HIGH (ref 7–23)
BUN SERPL-MCNC: 30 MG/DL — HIGH (ref 7–23)
CALCIUM SERPL-MCNC: 8.5 MG/DL — SIGNIFICANT CHANGE UP (ref 8.4–10.5)
CALCIUM SERPL-MCNC: 9.2 MG/DL — SIGNIFICANT CHANGE UP (ref 8.4–10.5)
CHLORIDE SERPL-SCNC: 100 MMOL/L — SIGNIFICANT CHANGE UP (ref 98–107)
CHLORIDE SERPL-SCNC: 101 MMOL/L — SIGNIFICANT CHANGE UP (ref 98–107)
CO2 SERPL-SCNC: 18 MMOL/L — LOW (ref 22–31)
CO2 SERPL-SCNC: 20 MMOL/L — LOW (ref 22–31)
CREAT SERPL-MCNC: 1.42 MG/DL — HIGH (ref 0.5–1.3)
CREAT SERPL-MCNC: 1.58 MG/DL — HIGH (ref 0.5–1.3)
EGFR: 30 ML/MIN/1.73M2 — LOW
EGFR: 34 ML/MIN/1.73M2 — LOW
EOSINOPHIL # BLD AUTO: 0.02 K/UL — SIGNIFICANT CHANGE UP (ref 0–0.5)
EOSINOPHIL NFR BLD AUTO: 0.1 % — SIGNIFICANT CHANGE UP (ref 0–6)
GLUCOSE BLDC GLUCOMTR-MCNC: 168 MG/DL — HIGH (ref 70–99)
GLUCOSE SERPL-MCNC: 161 MG/DL — HIGH (ref 70–99)
GLUCOSE SERPL-MCNC: 170 MG/DL — HIGH (ref 70–99)
HCT VFR BLD CALC: 28.5 % — LOW (ref 34.5–45)
HCT VFR BLD CALC: 28.8 % — LOW (ref 34.5–45)
HGB BLD-MCNC: 9.2 G/DL — LOW (ref 11.5–15.5)
HGB BLD-MCNC: 9.4 G/DL — LOW (ref 11.5–15.5)
IANC: 17.19 K/UL — HIGH (ref 1.8–7.4)
IMM GRANULOCYTES NFR BLD AUTO: 0.7 % — SIGNIFICANT CHANGE UP (ref 0–0.9)
INR BLD: <0.9 RATIO — SIGNIFICANT CHANGE UP (ref 0.85–1.18)
LYMPHOCYTES # BLD AUTO: 1.26 K/UL — SIGNIFICANT CHANGE UP (ref 1–3.3)
LYMPHOCYTES # BLD AUTO: 6.4 % — LOW (ref 13–44)
MAGNESIUM SERPL-MCNC: 1.7 MG/DL — SIGNIFICANT CHANGE UP (ref 1.6–2.6)
MCHC RBC-ENTMCNC: 25.8 PG — LOW (ref 27–34)
MCHC RBC-ENTMCNC: 26 PG — LOW (ref 27–34)
MCHC RBC-ENTMCNC: 32.3 GM/DL — SIGNIFICANT CHANGE UP (ref 32–36)
MCHC RBC-ENTMCNC: 32.6 GM/DL — SIGNIFICANT CHANGE UP (ref 32–36)
MCV RBC AUTO: 79.1 FL — LOW (ref 80–100)
MCV RBC AUTO: 80.5 FL — SIGNIFICANT CHANGE UP (ref 80–100)
MONOCYTES # BLD AUTO: 1.08 K/UL — HIGH (ref 0–0.9)
MONOCYTES NFR BLD AUTO: 5.5 % — SIGNIFICANT CHANGE UP (ref 2–14)
NEUTROPHILS # BLD AUTO: 17.19 K/UL — HIGH (ref 1.8–7.4)
NEUTROPHILS NFR BLD AUTO: 87.1 % — HIGH (ref 43–77)
NRBC # BLD: 0 /100 WBCS — SIGNIFICANT CHANGE UP (ref 0–0)
NRBC # BLD: 0 /100 WBCS — SIGNIFICANT CHANGE UP (ref 0–0)
NRBC # FLD: 0 K/UL — SIGNIFICANT CHANGE UP (ref 0–0)
NRBC # FLD: 0 K/UL — SIGNIFICANT CHANGE UP (ref 0–0)
PHOSPHATE SERPL-MCNC: 4.4 MG/DL — SIGNIFICANT CHANGE UP (ref 2.5–4.5)
PLATELET # BLD AUTO: 357 K/UL — SIGNIFICANT CHANGE UP (ref 150–400)
PLATELET # BLD AUTO: 418 K/UL — HIGH (ref 150–400)
POTASSIUM SERPL-MCNC: 4.3 MMOL/L — SIGNIFICANT CHANGE UP (ref 3.5–5.3)
POTASSIUM SERPL-MCNC: 4.9 MMOL/L — SIGNIFICANT CHANGE UP (ref 3.5–5.3)
POTASSIUM SERPL-SCNC: 4.3 MMOL/L — SIGNIFICANT CHANGE UP (ref 3.5–5.3)
POTASSIUM SERPL-SCNC: 4.9 MMOL/L — SIGNIFICANT CHANGE UP (ref 3.5–5.3)
PROTHROM AB SERPL-ACNC: 10.1 SEC — SIGNIFICANT CHANGE UP (ref 9.5–13)
RBC # BLD: 3.54 M/UL — LOW (ref 3.8–5.2)
RBC # BLD: 3.64 M/UL — LOW (ref 3.8–5.2)
RBC # FLD: 14.7 % — HIGH (ref 10.3–14.5)
RBC # FLD: 15.7 % — HIGH (ref 10.3–14.5)
RH IG SCN BLD-IMP: POSITIVE — SIGNIFICANT CHANGE UP
SODIUM SERPL-SCNC: 135 MMOL/L — SIGNIFICANT CHANGE UP (ref 135–145)
SODIUM SERPL-SCNC: 136 MMOL/L — SIGNIFICANT CHANGE UP (ref 135–145)
WBC # BLD: 19.72 K/UL — HIGH (ref 3.8–10.5)
WBC # BLD: 23.33 K/UL — HIGH (ref 3.8–10.5)
WBC # FLD AUTO: 19.72 K/UL — HIGH (ref 3.8–10.5)
WBC # FLD AUTO: 23.33 K/UL — HIGH (ref 3.8–10.5)

## 2024-05-25 PROCEDURE — 27245 TREAT THIGH FRACTURE: CPT | Mod: RT

## 2024-05-25 DEVICE — PIN ORTHO PRECISION TAPER 3.9X450MM: Type: IMPLANTABLE DEVICE | Site: RIGHT | Status: FUNCTIONAL

## 2024-05-25 DEVICE — NAIL INTRAMED TROCHANTER 125 DEG 10X170MM: Type: IMPLANTABLE DEVICE | Site: RIGHT | Status: FUNCTIONAL

## 2024-05-25 DEVICE — SCREW GAMMA LAG 10.5X90MM STRL: Type: IMPLANTABLE DEVICE | Site: RIGHT | Status: FUNCTIONAL

## 2024-05-25 DEVICE — K-WIRE STRYKER 3.2MM X 450MM: Type: IMPLANTABLE DEVICE | Site: RIGHT | Status: FUNCTIONAL

## 2024-05-25 DEVICE — SCREW LOKG 5X30MM: Type: IMPLANTABLE DEVICE | Site: RIGHT | Status: FUNCTIONAL

## 2024-05-25 RX ORDER — TRAMADOL HYDROCHLORIDE 50 MG/1
25 TABLET ORAL EVERY 6 HOURS
Refills: 0 | Status: DISCONTINUED | OUTPATIENT
Start: 2024-05-25 | End: 2024-05-25

## 2024-05-25 RX ORDER — ACETAMINOPHEN 500 MG
625 TABLET ORAL ONCE
Refills: 0 | Status: COMPLETED | OUTPATIENT
Start: 2024-05-25 | End: 2024-05-25

## 2024-05-25 RX ORDER — TRAMADOL HYDROCHLORIDE 50 MG/1
50 TABLET ORAL EVERY 6 HOURS
Refills: 0 | Status: DISCONTINUED | OUTPATIENT
Start: 2024-05-25 | End: 2024-05-29

## 2024-05-25 RX ORDER — ACETAMINOPHEN 500 MG
2 TABLET ORAL
Refills: 0 | DISCHARGE

## 2024-05-25 RX ORDER — POLYETHYLENE GLYCOL 3350 17 G/17G
17 POWDER, FOR SOLUTION ORAL DAILY
Refills: 0 | Status: DISCONTINUED | OUTPATIENT
Start: 2024-05-25 | End: 2024-05-29

## 2024-05-25 RX ORDER — LANOLIN ALCOHOL/MO/W.PET/CERES
3 CREAM (GRAM) TOPICAL AT BEDTIME
Refills: 0 | Status: DISCONTINUED | OUTPATIENT
Start: 2024-05-25 | End: 2024-05-29

## 2024-05-25 RX ORDER — ATENOLOL 25 MG/1
50 TABLET ORAL
Refills: 0 | Status: DISCONTINUED | OUTPATIENT
Start: 2024-05-25 | End: 2024-05-26

## 2024-05-25 RX ORDER — CEFAZOLIN SODIUM 1 G
2000 VIAL (EA) INJECTION EVERY 8 HOURS
Refills: 0 | Status: COMPLETED | OUTPATIENT
Start: 2024-05-25 | End: 2024-05-26

## 2024-05-25 RX ORDER — ONDANSETRON 8 MG/1
4 TABLET, FILM COATED ORAL ONCE
Refills: 0 | Status: DISCONTINUED | OUTPATIENT
Start: 2024-05-25 | End: 2024-05-25

## 2024-05-25 RX ORDER — SODIUM CHLORIDE 9 MG/ML
250 INJECTION, SOLUTION INTRAVENOUS ONCE
Refills: 0 | Status: COMPLETED | OUTPATIENT
Start: 2024-05-25 | End: 2024-05-25

## 2024-05-25 RX ORDER — HYDROMORPHONE HYDROCHLORIDE 2 MG/ML
0.5 INJECTION INTRAMUSCULAR; INTRAVENOUS; SUBCUTANEOUS
Refills: 0 | Status: DISCONTINUED | OUTPATIENT
Start: 2024-05-25 | End: 2024-05-25

## 2024-05-25 RX ORDER — PREGABALIN 225 MG/1
1 CAPSULE ORAL
Refills: 0 | DISCHARGE

## 2024-05-25 RX ORDER — ASPIRIN/CALCIUM CARB/MAGNESIUM 324 MG
81 TABLET ORAL EVERY 12 HOURS
Refills: 0 | Status: DISCONTINUED | OUTPATIENT
Start: 2024-05-25 | End: 2024-05-29

## 2024-05-25 RX ORDER — HYDROMORPHONE HYDROCHLORIDE 2 MG/ML
0.25 INJECTION INTRAMUSCULAR; INTRAVENOUS; SUBCUTANEOUS
Refills: 0 | Status: DISCONTINUED | OUTPATIENT
Start: 2024-05-25 | End: 2024-05-25

## 2024-05-25 RX ORDER — TRAMADOL HYDROCHLORIDE 50 MG/1
25 TABLET ORAL ONCE
Refills: 0 | Status: DISCONTINUED | OUTPATIENT
Start: 2024-05-25 | End: 2024-05-25

## 2024-05-25 RX ORDER — ONDANSETRON 8 MG/1
4 TABLET, FILM COATED ORAL ONCE
Refills: 0 | Status: COMPLETED | OUTPATIENT
Start: 2024-05-25 | End: 2024-05-25

## 2024-05-25 RX ORDER — ENOXAPARIN SODIUM 100 MG/ML
40 INJECTION SUBCUTANEOUS EVERY 24 HOURS
Refills: 0 | Status: DISCONTINUED | OUTPATIENT
Start: 2024-05-25 | End: 2024-05-25

## 2024-05-25 RX ORDER — HYDRALAZINE HCL 50 MG
2.5 TABLET ORAL ONCE
Refills: 0 | Status: COMPLETED | OUTPATIENT
Start: 2024-05-25 | End: 2024-05-25

## 2024-05-25 RX ORDER — MAGNESIUM SULFATE 500 MG/ML
2 VIAL (ML) INJECTION ONCE
Refills: 0 | Status: COMPLETED | OUTPATIENT
Start: 2024-05-25 | End: 2024-05-25

## 2024-05-25 RX ADMIN — ZOLPIDEM TARTRATE 5 MILLIGRAM(S): 10 TABLET ORAL at 01:35

## 2024-05-25 RX ADMIN — CHLORHEXIDINE GLUCONATE 1 APPLICATION(S): 213 SOLUTION TOPICAL at 05:36

## 2024-05-25 RX ADMIN — OXYCODONE HYDROCHLORIDE 2.5 MILLIGRAM(S): 5 TABLET ORAL at 20:45

## 2024-05-25 RX ADMIN — SODIUM CHLORIDE 500 MILLILITER(S): 9 INJECTION, SOLUTION INTRAVENOUS at 20:10

## 2024-05-25 RX ADMIN — Medication 25 MILLIGRAM(S): at 05:42

## 2024-05-25 RX ADMIN — Medication 975 MILLIGRAM(S): at 00:30

## 2024-05-25 RX ADMIN — Medication 625 MILLIGRAM(S): at 20:00

## 2024-05-25 RX ADMIN — Medication 81 MILLIGRAM(S): at 22:18

## 2024-05-25 RX ADMIN — Medication 250 MILLIGRAM(S): at 19:45

## 2024-05-25 RX ADMIN — ONDANSETRON 4 MILLIGRAM(S): 8 TABLET, FILM COATED ORAL at 19:26

## 2024-05-25 RX ADMIN — SENNA PLUS 2 TABLET(S): 8.6 TABLET ORAL at 22:17

## 2024-05-25 RX ADMIN — ATENOLOL 50 MILLIGRAM(S): 25 TABLET ORAL at 05:44

## 2024-05-25 RX ADMIN — Medication 2.5 MILLIGRAM(S): at 19:02

## 2024-05-25 RX ADMIN — Medication 50 GRAM(S): at 20:21

## 2024-05-25 RX ADMIN — Medication 975 MILLIGRAM(S): at 13:39

## 2024-05-25 RX ADMIN — OXYCODONE HYDROCHLORIDE 2.5 MILLIGRAM(S): 5 TABLET ORAL at 20:15

## 2024-05-25 RX ADMIN — ONDANSETRON 4 MILLIGRAM(S): 8 TABLET, FILM COATED ORAL at 00:10

## 2024-05-25 NOTE — PROGRESS NOTE ADULT - SUBJECTIVE AND OBJECTIVE BOX
ORTHOPEDIC PROGRESS NOTE    Overnight events: None    SUBJECTIVE: Pt seen and examined at bedside. Patient is doing well, no acute complaints this AM. Pain is controlled with medication      OBJECTIVE:  Vital Signs Last 24 Hrs  T(C): 36.6 (25 May 2024 01:20), Max: 36.7 (24 May 2024 19:19)  T(F): 97.8 (25 May 2024 01:20), Max: 98 (24 May 2024 19:19)  HR: 80 (25 May 2024 01:20) (80 - 95)  BP: 164/72 (25 May 2024 01:20) (164/72 - 222/97)  BP(mean): --  RR: 18 (25 May 2024 01:20) (16 - 18)  SpO2: 100% (25 May 2024 01:20) (94% - 100%)    Parameters below as of 25 May 2024 01:20  Patient On (Oxygen Delivery Method): room air      05-24-24 @ 07:01  -  05-25-24 @ 06:04  --------------------------------------------------------  IN: 0 mL / OUT: 350 mL / NET: -350 mL    Physical Examination:  GEN: NAD, resting quietly  PULM: symmetric chest rise bilaterally, no increased WOB  ABD: nondistended  EXTR:   RLE:  Skin intact, shortened and externally rotated, +edema and +ecchymosis over R hip  +TTP over R hip, no TTP along remainder of extremity; compartments soft  Limited ROM at hip 2/2 pain  Motor: TA/EHL/GS/FHL intact  Sensory: DP/SP/Tib/Jackie/Saph SILT  +DP pulse, WWP      LABS:                        9.4    19.72 )-----------( 418      ( 25 May 2024 01:40 )             28.8       05-25    135  |  101  |  30<H>  ----------------------------<  170<H>  4.9   |  18<L>  |  1.42<H>    Ca    9.2      25 May 2024 01:40    TPro  7.1  /  Alb  4.2  /  TBili  0.2  /  DBili  x   /  AST  18  /  ALT  8   /  AlkPhos  85  05-24

## 2024-05-25 NOTE — CHART NOTE - NSCHARTNOTEFT_GEN_A_CORE
ORTHOPEDIC SURGERY POST-OP CHECK    S: Patient seen and examined at bedside POD0 s/p ___. Pain well controlled with current regimen. Denies numbness/tingling in the extremity. Denies fever, chills, shortness of breath, and chest pain.     O: T(C): 36.3 (05-25-24 @ 21:47), Max: 36.7 (05-24-24 @ 23:15)  HR: 60 (05-25-24 @ 21:47) (60 - 97)  BP: 114/60 (05-25-24 @ 21:47) (114/60 - 212/76)  RR: 18 (05-25-24 @ 21:47) (16 - 22)  SpO2: 100% (05-25-24 @ 21:47) (95% - 100%)    Exam:   Gen: NAD, resting in bed  Resp: unlabored breathing  RLE: dressing c/d/i        +EHL/FHL/TA/GS         SILT Mendoza/Saph/Tib/DP/SP        2+ DP, cap refill <2 sec            05-24-24 @ 07:01  -  05-25-24 @ 07:00  --------------------------------------------------------  IN: 0 mL / OUT: 350 mL / NET: -350 mL    05-25-24 @ 07:01  -  05-25-24 @ 22:22  --------------------------------------------------------  IN: 0 mL / OUT: 100 mL / NET: -100 mL          A/P: 89yFemale POD0 s/p  R IMN recovering well  - Pain control  - WBAT  EXT  - DVT ppx:   - PT/OT  - OOB/AAT  - Regular diet  - Monitor I&Os

## 2024-05-26 LAB
ANION GAP SERPL CALC-SCNC: 15 MMOL/L — HIGH (ref 7–14)
BUN SERPL-MCNC: 34 MG/DL — HIGH (ref 7–23)
CALCIUM SERPL-MCNC: 8.2 MG/DL — LOW (ref 8.4–10.5)
CHLORIDE SERPL-SCNC: 100 MMOL/L — SIGNIFICANT CHANGE UP (ref 98–107)
CO2 SERPL-SCNC: 19 MMOL/L — LOW (ref 22–31)
CREAT SERPL-MCNC: 1.75 MG/DL — HIGH (ref 0.5–1.3)
EGFR: 26 ML/MIN/1.73M2 — LOW
GLUCOSE SERPL-MCNC: 157 MG/DL — HIGH (ref 70–99)
HCT VFR BLD CALC: 21 % — CRITICAL LOW (ref 34.5–45)
HGB BLD-MCNC: 6.6 G/DL — CRITICAL LOW (ref 11.5–15.5)
MCHC RBC-ENTMCNC: 25.9 PG — LOW (ref 27–34)
MCHC RBC-ENTMCNC: 31.4 GM/DL — LOW (ref 32–36)
MCV RBC AUTO: 82.4 FL — SIGNIFICANT CHANGE UP (ref 80–100)
NRBC # BLD: 0 /100 WBCS — SIGNIFICANT CHANGE UP (ref 0–0)
NRBC # FLD: 0 K/UL — SIGNIFICANT CHANGE UP (ref 0–0)
PLATELET # BLD AUTO: 320 K/UL — SIGNIFICANT CHANGE UP (ref 150–400)
POTASSIUM SERPL-MCNC: 4.6 MMOL/L — SIGNIFICANT CHANGE UP (ref 3.5–5.3)
POTASSIUM SERPL-SCNC: 4.6 MMOL/L — SIGNIFICANT CHANGE UP (ref 3.5–5.3)
RBC # BLD: 2.55 M/UL — LOW (ref 3.8–5.2)
RBC # FLD: 15.5 % — HIGH (ref 10.3–14.5)
SODIUM SERPL-SCNC: 134 MMOL/L — LOW (ref 135–145)
WBC # BLD: 13.06 K/UL — HIGH (ref 3.8–10.5)
WBC # FLD AUTO: 13.06 K/UL — HIGH (ref 3.8–10.5)

## 2024-05-26 PROCEDURE — 99233 SBSQ HOSP IP/OBS HIGH 50: CPT

## 2024-05-26 RX ORDER — ATENOLOL 25 MG/1
50 TABLET ORAL DAILY
Refills: 0 | Status: DISCONTINUED | OUTPATIENT
Start: 2024-05-26 | End: 2024-05-29

## 2024-05-26 RX ORDER — SODIUM CHLORIDE 9 MG/ML
500 INJECTION, SOLUTION INTRAVENOUS ONCE
Refills: 0 | Status: COMPLETED | OUTPATIENT
Start: 2024-05-26 | End: 2024-05-26

## 2024-05-26 RX ADMIN — Medication 100 MILLIGRAM(S): at 00:13

## 2024-05-26 RX ADMIN — SODIUM CHLORIDE 500 MILLILITER(S): 9 INJECTION, SOLUTION INTRAVENOUS at 10:23

## 2024-05-26 RX ADMIN — Medication 81 MILLIGRAM(S): at 10:23

## 2024-05-26 RX ADMIN — OXYCODONE HYDROCHLORIDE 2.5 MILLIGRAM(S): 5 TABLET ORAL at 18:00

## 2024-05-26 RX ADMIN — SENNA PLUS 2 TABLET(S): 8.6 TABLET ORAL at 21:54

## 2024-05-26 RX ADMIN — Medication 975 MILLIGRAM(S): at 11:23

## 2024-05-26 RX ADMIN — Medication 975 MILLIGRAM(S): at 19:00

## 2024-05-26 RX ADMIN — POLYETHYLENE GLYCOL 3350 17 GRAM(S): 17 POWDER, FOR SOLUTION ORAL at 11:25

## 2024-05-26 RX ADMIN — OXYCODONE HYDROCHLORIDE 2.5 MILLIGRAM(S): 5 TABLET ORAL at 19:00

## 2024-05-26 RX ADMIN — OXYCODONE HYDROCHLORIDE 2.5 MILLIGRAM(S): 5 TABLET ORAL at 10:22

## 2024-05-26 RX ADMIN — Medication 100 MILLIGRAM(S): at 11:09

## 2024-05-26 RX ADMIN — ZOLPIDEM TARTRATE 5 MILLIGRAM(S): 10 TABLET ORAL at 00:13

## 2024-05-26 RX ADMIN — Medication 3 MILLIGRAM(S): at 21:56

## 2024-05-26 RX ADMIN — Medication 975 MILLIGRAM(S): at 18:01

## 2024-05-26 RX ADMIN — Medication 81 MILLIGRAM(S): at 21:54

## 2024-05-26 RX ADMIN — Medication 975 MILLIGRAM(S): at 10:23

## 2024-05-26 RX ADMIN — OXYCODONE HYDROCHLORIDE 2.5 MILLIGRAM(S): 5 TABLET ORAL at 11:23

## 2024-05-26 NOTE — PROVIDER CONTACT NOTE (CRITICAL VALUE NOTIFICATION) - DATE AND TIME:
Patient: Nathen Gage    Procedure(s):  RIGHT EYE PHACOEMULSIFICATION CLEAR CORNEA WITH STANDARD INTRAOCULAR LENS IMPLANT  - Wound Class: I-Clean    Diagnosis: NUCLEAR CATARACT   Diagnosis Additional Information: No value filed.    Anesthesia Type:   MAC     Note:  Airway :Room Air  Patient transferred to:PACU  Comments: Transferred to EC PACU, spontaneous RR, on room air.  Monitors and alarms on and functioning, VSS, patient awake and comfortable.  Report to EC PACU RN.Handoff Report: Identifed the Patient, Identified the Reponsible Provider, Reviewed the pertinent medical history, Discussed the surgical course, Reviewed Intra-OP anesthesia mangement and issues during anesthesia, Set expectations for post-procedure period and Allowed opportunity for questions and acknowledgement of understanding      Vitals: (Last set prior to Anesthesia Care Transfer)    CRNA VITALS  5/29/2018 1256 - 5/29/2018 1328      5/29/2018             NIBP: 145/84    Pulse: 60    NIBP Mean: 114    Ht Rate: 60    SpO2: 100 %    Resp Rate (set): 10                Electronically Signed By: TYRESE Centeno CRNA  May 29, 2018  1:28 PM   26-May-2024 14:07 26-May-2024 14:05

## 2024-05-26 NOTE — PHYSICAL THERAPY INITIAL EVALUATION ADULT - GROSSLY INTACT, SENSORY
SOAP Progress Note


Assessment/Plan: 


Assessment:





Polyneuropathy with lower extremity weakness, status post treatment with IVIG 

and improvement.  She has had some return of strength as well as full return of 

reflexes.  Reviewing notes from her 2 hospitalizations, she was previously 

found to be areflexic.  


* Initial functional independence measure is 83 on 1/18/2019, improved to 93 as 

of 1/23/2019.  Functionally inconsistent.  Minimal assist for bed mobility sit 

to supine, using reacher to help raise legs.  Transfers vary from minimal 

assist to independent; worse with fatigue.  Ambulated 150 ft with standby 

assist.  Climbs and descends 4 steps with minimal to moderate assistance.  Most 

ADLs standby assist but bath transfer requires moderate assist.  She does 

grooming and hygiene seated with modified independence.


* Advanced to independent in her room as of 1/24/2019.


* Continue PT and OT to optimize mobility and activities of daily living.





Weight loss with muscle wasting.  Management per dietitian.  Good appetite.





Lymphedema.  Previously treated with furosemide.  May be worsened by need for 

prednisone.  Echocardiogram ruled out congestive heart failure as a contributor 

to the lymphedema.  As she begins to spend more time upright, she will be 

monitored for recurrence.  Using NEISHA hose.  Consider diuresis; she was 

previously on furosemide.





Elevated blood pressure on admission to inpatient rehabilitation.  Will follow 

her vitals and consider treatment if blood pressure remains significantly 

elevated.  Blood pressure may be elevated due to prednisone treatment.  

Prednisone taper per Oncology; currently at 50 mg q.day and to decrease to 40 

mg q.day when she discharges.


* Not hypertensive on 1/18/2019.





History of melanoma.  No longer on nivolumab.  She will follow up with her 

primary oncologist, Dr. Carmina Mcdaniels.





Prophylaxis.  With a solid cancer and immobility and age, she is at high risk 

for venous thrombosis.  Continue enoxaparin and SCDs at night.  





DISPOSITION:  She lives alone but has a local son is available to help.  Goal 

for independent or modified independent function for return to home.  Discharge 

date set for 2/1/2019.





Followup.  She will follow up with her primary oncologist, Dr. Carmina Mcdaniels, and 

she will follow up with neurologist, Dr. Greene with [facility], after 

her discharge.











01/24/19 14:43





Subjective: 





Noted her right leg moving around on its own today.  This is not happening as 

she is attaining sleep and does not interfere with her sleep.  Has some 

tingling sensations in her legs but these are not bothersome.  Otherwise 

without complaints.


Objective: 





 Vital Signs











Temp Pulse Resp BP Pulse Ox


 


 36.7 C   77   15   135/84 H  92 


 


 01/24/19 06:06  01/24/19 06:06  01/24/19 06:06  01/24/19 06:06  01/24/19 06:06








 











 01/23/19 01/24/19 01/25/19





 05:59 05:59 05:59


 


Intake Total 340 690 900


 


Balance 340 690 900














Physical Exam





- Physical Exam


General Appearance: WD/WN, alert, no apparent distress, thin


Respiratory: No respiratory distress, No accessory muscle use


Skin: normal color, warm/dry


Neuro/Psych: alert, normal mood/affect, oriented x 3, motor weakness (Leg 

weakness)





ICD10 Worksheet


Patient Problems: 


 Problems











Problem Status Onset


 


Bilateral leg weakness Acute Left UE/Right UE/Left LE/Right LE/Grossly Intact

## 2024-05-26 NOTE — PROGRESS NOTE ADULT - PROBLEM SELECTOR PLAN 4
Cr 1.75 (5/26) from 1.61 on presentation. Unclear baseline. Per review of [facility] records has ranged from 1.3-1.5 recently. Likely pre-renal in setting of recent illness, NPO for OR.  - Give 500 cc IVF now  - Repeat BMP 5/27  - Hold home irbesartan  - Hold home HCTZ

## 2024-05-26 NOTE — PHYSICAL THERAPY INITIAL EVALUATION ADULT - ACTIVE RANGE OF MOTION EXAMINATION, REHAB EVAL
active assistive ROM of right hip flexion 0-50 degrees/bilateral upper extremity Active ROM was WFL (within functional limits)/Left LE Active ROM was WFL (within functional limits)

## 2024-05-26 NOTE — PROGRESS NOTE ADULT - PROBLEM SELECTOR PLAN 1
R hip fracture after fall. S/p R IMN on 5/25  - Pain control and DVT ppx per primary team  - Bowel regimen  - PT evaluation R hip fracture after fall. S/p R IMN on 5/25  - Pain control, antibiotics, and DVT ppx per primary team  - Bowel regimen  - PT evaluation

## 2024-05-26 NOTE — PROGRESS NOTE ADULT - SUBJECTIVE AND OBJECTIVE BOX
Orthopedic Surgery Progress Note     S: Patient seen and examined today. No acute events overnight. Pain is well controlled. Denies f/c, chest pain, shortness of breath, dizziness.    MEDICATIONS  (STANDING):  acetaminophen     Tablet .. 975 milliGRAM(s) Oral every 8 hours  aspirin enteric coated 81 milliGRAM(s) Oral every 12 hours  atenolol  Tablet 50 milliGRAM(s) Oral two times a day  ceFAZolin   IVPB 2000 milliGRAM(s) IV Intermittent every 8 hours  chlorhexidine 2% Cloths 1 Application(s) Topical <User Schedule>  polyethylene glycol 3350 17 Gram(s) Oral daily  povidone iodine 5% Nasal Swab 1 Application(s) Both Nostrils once  senna 2 Tablet(s) Oral at bedtime    MEDICATIONS  (PRN):  magnesium hydroxide Suspension 30 milliLiter(s) Oral daily PRN Constipation  melatonin 3 milliGRAM(s) Oral at bedtime PRN Insomnia  oxyCODONE    IR 2.5 milliGRAM(s) Oral every 4 hours PRN Moderate Pain (4 - 6)  traMADol 50 milliGRAM(s) Oral every 6 hours PRN Severe Pain (7 - 10)  zolpidem 5 milliGRAM(s) Oral at bedtime PRN Insomnia      Physical Exam:  Gen: NAD  RLE: dressing c/d/i        +EHL/FHL/TA/GS         SILT Mendoza/Saph/Tib/DP/SP        2+ DP, cap refill <2 sec    Vital Signs Last 24 Hrs  T(C): 36.6 (26 May 2024 05:09), Max: 36.7 (25 May 2024 12:53)  T(F): 97.8 (26 May 2024 05:09), Max: 98 (25 May 2024 12:53)  HR: 75 (26 May 2024 05:09) (60 - 92)  BP: 143/48 (26 May 2024 05:09) (114/60 - 207/64)  BP(mean): 69 (25 May 2024 20:45) (67 - 102)  RR: 18 (26 May 2024 05:09) (16 - 22)  SpO2: 91% (26 May 2024 05:09) (91% - 100%)    Parameters below as of 26 May 2024 05:09  Patient On (Oxygen Delivery Method): room air        05-25-24 @ 07:01  -  05-26-24 @ 07:00  --------------------------------------------------------  IN: 0 mL / OUT: 500 mL / NET: -500 mL          LABS:                        9.2    23.33 )-----------( 357      ( 25 May 2024 19:00 )             28.5     05-26    134<L>  |  100  |  34<H>  ----------------------------<  157<H>  4.6   |  19<L>  |  1.75<H>    Ca    8.2<L>      26 May 2024 06:20  Phos  4.4     05-25  Mg     1.70     05-25    TPro  7.1  /  Alb  4.2  /  TBili  0.2  /  DBili  x   /  AST  18  /  ALT  8   /  AlkPhos  85  05-24

## 2024-05-26 NOTE — PROGRESS NOTE ADULT - SUBJECTIVE AND OBJECTIVE BOX
[facility] Division of Hospital Medicine  Farrah Rodrigues MD EdM  Pager [phone]  Also available on MS Teams    SUBJECTIVE / OVERNIGHT EVENTS:  OR yesterday  No events overnight  Not having any pain this morning, but just woke up and has not moved  Not having any lightheadedness, shortness of breath; has persistent rib pain (chronic)    MEDICATIONS  (STANDING):  acetaminophen     Tablet .. 975 milliGRAM(s) Oral every 8 hours  aspirin enteric coated 81 milliGRAM(s) Oral every 12 hours  atenolol  Tablet 50 milliGRAM(s) Oral two times a day  ceFAZolin   IVPB 2000 milliGRAM(s) IV Intermittent every 8 hours  chlorhexidine 2% Cloths 1 Application(s) Topical <User Schedule>  polyethylene glycol 3350 17 Gram(s) Oral daily  povidone iodine 5% Nasal Swab 1 Application(s) Both Nostrils once  senna 2 Tablet(s) Oral at bedtime    MEDICATIONS  (PRN):  magnesium hydroxide Suspension 30 milliLiter(s) Oral daily PRN Constipation  melatonin 3 milliGRAM(s) Oral at bedtime PRN Insomnia  oxyCODONE    IR 2.5 milliGRAM(s) Oral every 4 hours PRN Moderate Pain (4 - 6)  traMADol 50 milliGRAM(s) Oral every 6 hours PRN Severe Pain (7 - 10)  zolpidem 5 milliGRAM(s) Oral at bedtime PRN Insomnia      I&O's Summary    25 May 2024 07:01  -  26 May 2024 07:00  --------------------------------------------------------  IN: 0 mL / OUT: 500 mL / NET: -500 mL        PHYSICAL EXAM:  Vital Signs Last 24 Hrs  T(C): 36.6 (26 May 2024 05:09), Max: 36.7 (25 May 2024 12:53)  T(F): 97.8 (26 May 2024 05:09), Max: 98 (25 May 2024 12:53)  HR: 75 (26 May 2024 05:09) (60 - 92)  BP: 143/48 (26 May 2024 05:09) (114/60 - 207/64)  BP(mean): 69 (25 May 2024 20:45) (67 - 102)  RR: 18 (26 May 2024 05:09) (16 - 22)  SpO2: 91% (26 May 2024 05:09) (91% - 100%)    Parameters below as of 26 May 2024 05:09  Patient On (Oxygen Delivery Method): room air      CONSTITUTIONAL: no acute distress, conversant  EYES: conjunctiva and sclera clear  ENMT: Moist oral mucosa  RESPIRATORY: Normal respiratory effort; lungs are clear to auscultation bilaterally  CARDIOVASCULAR: Regular rate and rhythm, normal S1 and S2, no murmur; No lower extremity edema  ABDOMEN: no tenderness to palpation, normoactive bowel sounds, no rebound/guarding  PSYCH: Alert; affect appropriate  NEUROLOGY: CN 2-12 are intact and symmetric; moving all extremities   MSK: Dressing over R hip c/d/i  SKIN: No rashes on examined skin    LABS:                        9.2    23.33 )-----------( 357      ( 25 May 2024 19:00 )             28.5     05-26    134<L>  |  100  |  34<H>  ----------------------------<  157<H>  4.6   |  19<L>  |  1.75<H>    Ca    8.2<L>      26 May 2024 06:20  Phos  4.4     05-25  Mg     1.70     05-25    TPro  7.1  /  Alb  4.2  /  TBili  0.2  /  DBili  x   /  AST  18  /  ALT  8   /  AlkPhos  85  05-24    PT/INR - ( 25 May 2024 01:40 )   PT: 10.1 sec;   INR: <0.90 ratio         PTT - ( 25 May 2024 01:40 )  PTT:30.7 sec [facility] Division of Hospital Medicine  Farrah Rodrigues MD EdM  Pager [phone]  Also available on MS Teams    SUBJECTIVE / OVERNIGHT EVENTS:  OR yesterday  No events overnight  Not having any pain this morning, but just woke up and has not moved  Not having any lightheadedness, shortness of breath; has persistent rib pain (chronic)    MEDICATIONS  (STANDING):  acetaminophen     Tablet .. 975 milliGRAM(s) Oral every 8 hours  aspirin enteric coated 81 milliGRAM(s) Oral every 12 hours  atenolol  Tablet 50 milliGRAM(s) Oral two times a day  ceFAZolin   IVPB 2000 milliGRAM(s) IV Intermittent every 8 hours  chlorhexidine 2% Cloths 1 Application(s) Topical <User Schedule>  polyethylene glycol 3350 17 Gram(s) Oral daily  povidone iodine 5% Nasal Swab 1 Application(s) Both Nostrils once  senna 2 Tablet(s) Oral at bedtime    MEDICATIONS  (PRN):  magnesium hydroxide Suspension 30 milliLiter(s) Oral daily PRN Constipation  melatonin 3 milliGRAM(s) Oral at bedtime PRN Insomnia  oxyCODONE    IR 2.5 milliGRAM(s) Oral every 4 hours PRN Moderate Pain (4 - 6)  traMADol 50 milliGRAM(s) Oral every 6 hours PRN Severe Pain (7 - 10)  zolpidem 5 milliGRAM(s) Oral at bedtime PRN Insomnia      I&O's Summary    25 May 2024 07:01  -  26 May 2024 07:00  --------------------------------------------------------  IN: 0 mL / OUT: 500 mL / NET: -500 mL        PHYSICAL EXAM:  Vital Signs Last 24 Hrs  T(C): 36.6 (26 May 2024 05:09), Max: 36.7 (25 May 2024 12:53)  T(F): 97.8 (26 May 2024 05:09), Max: 98 (25 May 2024 12:53)  HR: 75 (26 May 2024 05:09) (60 - 92)  BP: 143/48 (26 May 2024 05:09) (114/60 - 207/64)  BP(mean): 69 (25 May 2024 20:45) (67 - 102)  RR: 18 (26 May 2024 05:09) (16 - 22)  SpO2: 91% (26 May 2024 05:09) (91% - 100%)    Parameters below as of 26 May 2024 05:09  Patient On (Oxygen Delivery Method): room air      CONSTITUTIONAL: no acute distress, conversant  EYES: conjunctiva and sclera clear  ENMT: Moist oral mucosa  RESPIRATORY: Normal respiratory effort; lungs are clear to auscultation bilaterally  CARDIOVASCULAR: Regular rate and rhythm, normal S1 and S2, no murmur; No lower extremity edema  ABDOMEN: no tenderness to palpation, normoactive bowel sounds, no rebound/guarding  PSYCH: Alert; affect appropriate  NEUROLOGY: CN 2-12 are intact and symmetric; moving all extremities   MSK: Dressing over R hip c/d/i  SKIN: No rashes on examined skin    LABS:                        9.2    23.33 )-----------( 357      ( 25 May 2024 19:00 )             28.5     05-26    134<L>  |  100  |  34<H>  ----------------------------<  157<H>  4.6   |  19<L>  |  1.75<H>    Ca    8.2<L>      26 May 2024 06:20  Phos  4.4     05-25  Mg     1.70     05-25    TPro  7.1  /  Alb  4.2  /  TBili  0.2  /  DBili  x   /  AST  18  /  ALT  8   /  AlkPhos  85  05-24    PT/INR - ( 25 May 2024 01:40 )   PT: 10.1 sec;   INR: <0.90 ratio         PTT - ( 25 May 2024 01:40 )  PTT:30.7 sec      I discussed plan below with ortho resident

## 2024-05-27 LAB
ANION GAP SERPL CALC-SCNC: 13 MMOL/L — SIGNIFICANT CHANGE UP (ref 7–14)
BUN SERPL-MCNC: 35 MG/DL — HIGH (ref 7–23)
CALCIUM SERPL-MCNC: 8.6 MG/DL — SIGNIFICANT CHANGE UP (ref 8.4–10.5)
CHLORIDE SERPL-SCNC: 103 MMOL/L — SIGNIFICANT CHANGE UP (ref 98–107)
CO2 SERPL-SCNC: 20 MMOL/L — LOW (ref 22–31)
CREAT SERPL-MCNC: 1.64 MG/DL — HIGH (ref 0.5–1.3)
EGFR: 28 ML/MIN/1.73M2 — LOW
GLUCOSE SERPL-MCNC: 176 MG/DL — HIGH (ref 70–99)
HCT VFR BLD CALC: 30.3 % — LOW (ref 34.5–45)
HGB BLD-MCNC: 10.4 G/DL — LOW (ref 11.5–15.5)
MCHC RBC-ENTMCNC: 27.8 PG — SIGNIFICANT CHANGE UP (ref 27–34)
MCHC RBC-ENTMCNC: 34.3 GM/DL — SIGNIFICANT CHANGE UP (ref 32–36)
MCV RBC AUTO: 81 FL — SIGNIFICANT CHANGE UP (ref 80–100)
NRBC # BLD: 0 /100 WBCS — SIGNIFICANT CHANGE UP (ref 0–0)
NRBC # FLD: 0.02 K/UL — HIGH (ref 0–0)
PLATELET # BLD AUTO: 280 K/UL — SIGNIFICANT CHANGE UP (ref 150–400)
POTASSIUM SERPL-MCNC: 5.1 MMOL/L — SIGNIFICANT CHANGE UP (ref 3.5–5.3)
POTASSIUM SERPL-SCNC: 5.1 MMOL/L — SIGNIFICANT CHANGE UP (ref 3.5–5.3)
RBC # BLD: 3.74 M/UL — LOW (ref 3.8–5.2)
RBC # FLD: 14.7 % — HIGH (ref 10.3–14.5)
SODIUM SERPL-SCNC: 136 MMOL/L — SIGNIFICANT CHANGE UP (ref 135–145)
WBC # BLD: 12.27 K/UL — HIGH (ref 3.8–10.5)
WBC # FLD AUTO: 12.27 K/UL — HIGH (ref 3.8–10.5)

## 2024-05-27 PROCEDURE — 99233 SBSQ HOSP IP/OBS HIGH 50: CPT

## 2024-05-27 RX ADMIN — Medication 975 MILLIGRAM(S): at 10:00

## 2024-05-27 RX ADMIN — Medication 81 MILLIGRAM(S): at 09:09

## 2024-05-27 RX ADMIN — OXYCODONE HYDROCHLORIDE 2.5 MILLIGRAM(S): 5 TABLET ORAL at 10:00

## 2024-05-27 RX ADMIN — OXYCODONE HYDROCHLORIDE 2.5 MILLIGRAM(S): 5 TABLET ORAL at 09:09

## 2024-05-27 RX ADMIN — OXYCODONE HYDROCHLORIDE 2.5 MILLIGRAM(S): 5 TABLET ORAL at 14:50

## 2024-05-27 RX ADMIN — Medication 81 MILLIGRAM(S): at 21:26

## 2024-05-27 RX ADMIN — OXYCODONE HYDROCHLORIDE 2.5 MILLIGRAM(S): 5 TABLET ORAL at 15:35

## 2024-05-27 RX ADMIN — Medication 975 MILLIGRAM(S): at 09:09

## 2024-05-27 RX ADMIN — Medication 975 MILLIGRAM(S): at 18:46

## 2024-05-27 RX ADMIN — ATENOLOL 50 MILLIGRAM(S): 25 TABLET ORAL at 05:13

## 2024-05-27 RX ADMIN — POLYETHYLENE GLYCOL 3350 17 GRAM(S): 17 POWDER, FOR SOLUTION ORAL at 09:09

## 2024-05-27 RX ADMIN — SENNA PLUS 2 TABLET(S): 8.6 TABLET ORAL at 21:26

## 2024-05-27 NOTE — PROGRESS NOTE ADULT - PROBLEM SELECTOR PLAN 4
Cr 1.75 (5/26) from 1.61 on presentation. Unclear baseline. Per review of [facility] records has ranged from 1.3-1.5 recently. Likely pre-renal in setting of recent illness, NPO for OR. Received 500 cc IVF and 2 units pRBC on 5/26.  - Repeat BMP ordered, follow-up  - Encourage oral hydration  - Hold home irbesartan

## 2024-05-27 NOTE — PROGRESS NOTE ADULT - PROBLEM SELECTOR PLAN 1
R hip fracture after fall. S/p R IMN on 5/25  - Pain control, antibiotics, and DVT ppx per primary team  - Bowel regimen  - PT evaluation - recommending CHELO

## 2024-05-27 NOTE — OCCUPATIONAL THERAPY INITIAL EVALUATION ADULT - GENERAL OBSERVATIONS, REHAB EVAL
Patient found semi-reclined in bed, NAD, and able to follow directions. Vitals: O2 98% on NC. Patient agreeable to participate in skilled OT evaluation.

## 2024-05-27 NOTE — PROVIDER CONTACT NOTE (OTHER) - ACTION/TREATMENT ORDERED:
Provider notified. RN give PO hydralazine and reassess in 2 hours
As per Dr. Ribera, blood pressure medication given as per order

## 2024-05-27 NOTE — OCCUPATIONAL THERAPY INITIAL EVALUATION ADULT - PERTINENT HX OF CURRENT PROBLEM, REHAB EVAL
Patient is a 89 year old female with PMH of HTN, OA, CKD (baseline Cr 1.3-1.5) presenting after fall with Rt hip fracture s/p IMN on 5/25 with course notable for DIMITRI on CKD.

## 2024-05-27 NOTE — PROGRESS NOTE ADULT - SUBJECTIVE AND OBJECTIVE BOX
ORTHOPEDIC PROGRESS NOTE    Overnight events: None    SUBJECTIVE: Pt seen and examined at bedside. Patient is doing well, no acute complaints this AM. Pain is controlled with medication      OBJECTIVE:  Vital Signs Last 24 Hrs  T(C): 36.6 (27 May 2024 05:21), Max: 36.6 (26 May 2024 09:29)  T(F): 97.8 (27 May 2024 05:21), Max: 97.9 (26 May 2024 09:29)  HR: 77 (27 May 2024 05:21) (76 - 85)  BP: 201/60 (27 May 2024 05:21) (107/52 - 201/60)  BP(mean): --  RR: 17 (27 May 2024 05:21) (17 - 18)  SpO2: 98% (27 May 2024 05:21) (92% - 98%)    Parameters below as of 27 May 2024 05:21  Patient On (Oxygen Delivery Method): nasal cannula    05-26-24 @ 07:01  -  05-27-24 @ 07:00  --------------------------------------------------------  IN: 0 mL / OUT: 1600 mL / NET: -1600 mL    Physical Examination:  GEN: NAD, resting quietly  PULM: symmetric chest rise bilaterally, no increased WOB  ABD: nondistended  EXTR:   RLE: dressing c/d/i        +EHL/FHL/TA/GS         SILT Mendoza/Saph/Tib/DP/SP        2+ DP, cap refill <2 sec      LABS:                        6.6    13.06 )-----------( 320      ( 26 May 2024 13:33 )             21.0       05-26    134<L>  |  100  |  34<H>  ----------------------------<  157<H>  4.6   |  19<L>  |  1.75<H>    Ca    8.2<L>      26 May 2024 06:20  Phos  4.4     05-25  Mg     1.70     05-25

## 2024-05-27 NOTE — PROGRESS NOTE ADULT - SUBJECTIVE AND OBJECTIVE BOX
[facility] Division of Hospital Medicine  Farrah Rodrigues MD EdM  Pager [phone]  Also available on MS Teams    SUBJECTIVE / OVERNIGHT EVENTS:  Hgb 6.6 - received 2U pRBC  This morning feeling fatigued, having abdominal pain and nausea as well as hip pain, also feeling anxious   on AM check, 170s on repeat. Given home atenolol    MEDICATIONS  (STANDING):  acetaminophen     Tablet .. 975 milliGRAM(s) Oral every 8 hours  aspirin enteric coated 81 milliGRAM(s) Oral every 12 hours  atenolol  Tablet 50 milliGRAM(s) Oral daily  chlorhexidine 2% Cloths 1 Application(s) Topical <User Schedule>  polyethylene glycol 3350 17 Gram(s) Oral daily  povidone iodine 5% Nasal Swab 1 Application(s) Both Nostrils once  senna 2 Tablet(s) Oral at bedtime    MEDICATIONS  (PRN):  magnesium hydroxide Suspension 30 milliLiter(s) Oral daily PRN Constipation  melatonin 3 milliGRAM(s) Oral at bedtime PRN Insomnia  oxyCODONE    IR 2.5 milliGRAM(s) Oral every 4 hours PRN Moderate Pain (4 - 6)  traMADol 50 milliGRAM(s) Oral every 6 hours PRN Severe Pain (7 - 10)  zolpidem 5 milliGRAM(s) Oral at bedtime PRN Insomnia      I&O's Summary    26 May 2024 07:01  -  27 May 2024 07:00  --------------------------------------------------------  IN: 0 mL / OUT: 1600 mL / NET: -1600 mL        PHYSICAL EXAM:  Vital Signs Last 24 Hrs  T(C): 36.3 (27 May 2024 09:18), Max: 36.6 (26 May 2024 09:29)  T(F): 97.4 (27 May 2024 09:18), Max: 97.9 (26 May 2024 09:29)  HR: 77 (27 May 2024 09:18) (76 - 85)  BP: 193/64 (27 May 2024 09:18) (107/52 - 201/60)  BP(mean): --  RR: 16 (27 May 2024 09:18) (16 - 18)  SpO2: 97% (27 May 2024 09:18) (92% - 98%)    Parameters below as of 27 May 2024 09:18  Patient On (Oxygen Delivery Method): nasal cannula  O2 Flow (L/min): 1    CONSTITUTIONAL: no acute distress, conversant  EYES: conjunctiva and sclera clear  ENMT: Moist oral mucosa  RESPIRATORY: Normal respiratory effort; lungs are clear to auscultation bilaterally  CARDIOVASCULAR: Regular rate and rhythm, normal S1 and S2, no murmur; No lower extremity edema  ABDOMEN: no tenderness to palpation, normoactive bowel sounds, no rebound/guarding  PSYCH: Alert; affect appropriate  NEUROLOGY: CN 2-12 are intact and symmetric; moving all extremities   MSK: Dressing over R hip c/d/i  SKIN: No rashes on examined skin    LABS:                        6.6    13.06 )-----------( 320      ( 26 May 2024 13:33 )             21.0     05-26    134<L>  |  100  |  34<H>  ----------------------------<  157<H>  4.6   |  19<L>  |  1.75<H>    Ca    8.2<L>      26 May 2024 06:20  Phos  4.4     05-25  Mg     1.70     05-25      COORDINATION OF CARE:  Consultant Communication: I discussed plan below with ortho team

## 2024-05-27 NOTE — PROVIDER CONTACT NOTE (OTHER) - SITUATION
Patient Patient's blood pressure 201/60 and HR 77. Patient states she didn't sleep all night and is anxious.

## 2024-05-27 NOTE — PROVIDER CONTACT NOTE (OTHER) - ASSESSMENT
Patient anxious because she didn't sleep all night. Patient is asymptomatic. Patient frustrated with sleep schedule throughout night

## 2024-05-27 NOTE — PROGRESS NOTE ADULT - ATTENDING COMMENTS
POD2 s/p right hip IM nail - received 2 u PRBC yesterday.  Hgb 10 today  Afeb, vss  Right hip dressing dry. Silver dollar sized stain.  No calf tenderness  DNVI  Stable, OOB with PT  D/C planning

## 2024-05-28 ENCOUNTER — TRANSCRIPTION ENCOUNTER (OUTPATIENT)
Age: 89
End: 2024-05-28

## 2024-05-28 LAB
ANION GAP SERPL CALC-SCNC: 11 MMOL/L — SIGNIFICANT CHANGE UP (ref 7–14)
BUN SERPL-MCNC: 41 MG/DL — HIGH (ref 7–23)
CALCIUM SERPL-MCNC: 8.9 MG/DL — SIGNIFICANT CHANGE UP (ref 8.4–10.5)
CHLORIDE SERPL-SCNC: 104 MMOL/L — SIGNIFICANT CHANGE UP (ref 98–107)
CHLORIDE UR-SCNC: 65 MMOL/L — SIGNIFICANT CHANGE UP
CO2 SERPL-SCNC: 22 MMOL/L — SIGNIFICANT CHANGE UP (ref 22–31)
CREAT SERPL-MCNC: 1.68 MG/DL — HIGH (ref 0.5–1.3)
EGFR: 27 ML/MIN/1.73M2 — LOW
GLUCOSE SERPL-MCNC: 101 MG/DL — HIGH (ref 70–99)
HCT VFR BLD CALC: 29.6 % — LOW (ref 34.5–45)
HGB BLD-MCNC: 9.7 G/DL — LOW (ref 11.5–15.5)
MCHC RBC-ENTMCNC: 27.5 PG — SIGNIFICANT CHANGE UP (ref 27–34)
MCHC RBC-ENTMCNC: 32.8 GM/DL — SIGNIFICANT CHANGE UP (ref 32–36)
MCV RBC AUTO: 83.9 FL — SIGNIFICANT CHANGE UP (ref 80–100)
NRBC # BLD: 0 /100 WBCS — SIGNIFICANT CHANGE UP (ref 0–0)
NRBC # FLD: 0 K/UL — SIGNIFICANT CHANGE UP (ref 0–0)
OSMOLALITY UR: 436 MOSM/KG — SIGNIFICANT CHANGE UP (ref 50–1200)
PLATELET # BLD AUTO: 297 K/UL — SIGNIFICANT CHANGE UP (ref 150–400)
POTASSIUM SERPL-MCNC: 4.8 MMOL/L — SIGNIFICANT CHANGE UP (ref 3.5–5.3)
POTASSIUM SERPL-SCNC: 4.8 MMOL/L — SIGNIFICANT CHANGE UP (ref 3.5–5.3)
POTASSIUM UR-SCNC: 27.9 MMOL/L — SIGNIFICANT CHANGE UP
RBC # BLD: 3.53 M/UL — LOW (ref 3.8–5.2)
RBC # FLD: 15.2 % — HIGH (ref 10.3–14.5)
SODIUM SERPL-SCNC: 137 MMOL/L — SIGNIFICANT CHANGE UP (ref 135–145)
SODIUM UR-SCNC: 81 MMOL/L — SIGNIFICANT CHANGE UP
WBC # BLD: 11.6 K/UL — HIGH (ref 3.8–10.5)
WBC # FLD AUTO: 11.6 K/UL — HIGH (ref 3.8–10.5)

## 2024-05-28 PROCEDURE — 99233 SBSQ HOSP IP/OBS HIGH 50: CPT

## 2024-05-28 RX ORDER — HYDRALAZINE HCL 50 MG
10 TABLET ORAL EVERY 6 HOURS
Refills: 0 | Status: DISCONTINUED | OUTPATIENT
Start: 2024-05-28 | End: 2024-05-29

## 2024-05-28 RX ADMIN — OXYCODONE HYDROCHLORIDE 2.5 MILLIGRAM(S): 5 TABLET ORAL at 22:46

## 2024-05-28 RX ADMIN — SENNA PLUS 2 TABLET(S): 8.6 TABLET ORAL at 21:46

## 2024-05-28 RX ADMIN — Medication 81 MILLIGRAM(S): at 09:34

## 2024-05-28 RX ADMIN — OXYCODONE HYDROCHLORIDE 2.5 MILLIGRAM(S): 5 TABLET ORAL at 21:46

## 2024-05-28 RX ADMIN — Medication 975 MILLIGRAM(S): at 17:27

## 2024-05-28 RX ADMIN — POLYETHYLENE GLYCOL 3350 17 GRAM(S): 17 POWDER, FOR SOLUTION ORAL at 11:47

## 2024-05-28 RX ADMIN — Medication 975 MILLIGRAM(S): at 10:23

## 2024-05-28 RX ADMIN — Medication 81 MILLIGRAM(S): at 21:46

## 2024-05-28 RX ADMIN — OXYCODONE HYDROCHLORIDE 2.5 MILLIGRAM(S): 5 TABLET ORAL at 10:38

## 2024-05-28 RX ADMIN — OXYCODONE HYDROCHLORIDE 2.5 MILLIGRAM(S): 5 TABLET ORAL at 11:38

## 2024-05-28 RX ADMIN — Medication 975 MILLIGRAM(S): at 09:34

## 2024-05-28 RX ADMIN — ATENOLOL 50 MILLIGRAM(S): 25 TABLET ORAL at 06:06

## 2024-05-28 RX ADMIN — ZOLPIDEM TARTRATE 5 MILLIGRAM(S): 10 TABLET ORAL at 21:46

## 2024-05-28 NOTE — PROGRESS NOTE ADULT - PROBLEM SELECTOR PLAN 7
Noted on CXR. Patient reports chronic rib pain. She identifies a fall about a year ago that may have been the reason  - Pain control as above  - ISS and encourage out of bed

## 2024-05-28 NOTE — DISCHARGE NOTE NURSING/CASE MANAGEMENT/SOCIAL WORK - PATIENT PORTAL LINK FT
You can access the FollowMyHealth Patient Portal offered by [facility] by registering at the following website: [email or web address]. By joining [employer]’s FollowMyHealth portal, you will also be able to view your health information using other applications (apps) compatible with our system.

## 2024-05-28 NOTE — PROGRESS NOTE ADULT - PROBLEM SELECTOR PLAN 5
Hgb 7.4 on admission,  received 1 unit pRBC pre-operative and increased to 9.2 on repeat check. Subsequently Hgb dropped to 6.6 on 5/26, received 2U pRBC. No evidence of active bleeding currently, right leg compartments soft, no significant bruising.  - Repeat CBC ordered and pending
Hgb 7.4 on admission,  received 1 unit pRBC pre-operative and increased to 9.2 on repeat check. Asymptomatic  - Repeat CBC with AM labs tomorrow
Hgb 7.4 on admission,  received 1 unit pRBC pre-operative and increased to 9.2 on repeat check. Subsequently Hgb dropped to 6.6 on 5/26, received 2U pRBC. No evidence of active bleeding currently, right leg compartments soft, no significant bruising.  - Repeat CBC ordered and pending

## 2024-05-28 NOTE — PROGRESS NOTE ADULT - PROBLEM SELECTOR PLAN 2
Per patient her BP typically runs high at home despite medications. She takes: HCTZ 25mg daily, irbesartan 300 mg daily, atenolol 50 mg daily. BP initially low post-operatively, now elevated in setting of pain, anxiety, and holding home medications due to DIMITRI. Asymptomatic.    - Continue home atenolol 50 mg daily  - Resume home HCTZ now  - Hold irbesartan today  -Give Hydralazine 10mg IV q6hr prn for SBP > 170
Per patient her BP typically runs high at home despite medications. Confirmed home medications with patient's assisted living. She takes: HCTZ 25mg daily, irbesartan 300 mg daily, atenolol 50 mg daily. BP initially low post-operatively, now elevated in setting of pain, anxiety, and holding home medications due to DIMITRI. Asymptomatic.    - Continue home atenolol 50 mg daily  - Ordered AM BMP to eval creatinine  - Resume home HCTZ now  - Hold jing irbesartan, if Cr improving will resume home irbesartan
Confirmed home medications with patient's assisted living. She takes: HCTZ 25mg daily, irbesartan 300 mg daily, atenolol 50 mg daily. BP well controlled post-operatively  - Adjust atenolol to 50 mg daily (from BID) - this is her home dose and also appropriate dose for her renal function  - Hold HCTZ and irbesartan given DIMITRI  - D/c hydralazine given BPs have been lower (has not received last several doses due to hold parameters)

## 2024-05-28 NOTE — DISCHARGE NOTE PROVIDER - NSDCMRMEDTOKEN_GEN_ALL_CORE_FT
atenolol 50 mg oral tablet: 1 tab(s) orally 2 times a day  Avapro 300 mg oral tablet: 1 tab(s) orally once a day  Ditropan XL 5 mg/24 hours oral tablet, extended release: 1 tab(s) orally once a day at bedtime  eszopiclone 3 mg oral tablet: 1 tab(s) orally once a day  senna leaf extract oral tablet: 1 tab(s) orally once a day (at bedtime)  Vitamin D3: 1000 unit(s) orally once a day   acetaminophen 325 mg oral tablet: 3 tab(s) orally every 8 hours  aspirin 81 mg oral delayed release tablet: 1 tab(s) orally every 12 hours  atenolol 50 mg oral tablet: 1 tab(s) orally once a day  Avapro 300 mg oral tablet: 1 tab(s) orally once a day  Ditropan XL 5 mg/24 hours oral tablet, extended release: 1 tab(s) orally once a day at bedtime  eszopiclone 3 mg oral tablet: 1 tab(s) orally once a day  hydroCHLOROthiazide 25 mg oral tablet: 1 tab(s) orally once a day  melatonin 3 mg oral tablet: 1 tab(s) orally once a day (at bedtime) As needed Insomnia  oxyCODONE: 2.5 milligram(s) orally every 4 hours as needed for  moderate pain  senna leaf extract oral tablet: 2 tab(s) orally once a day (at bedtime)  traMADol 50 mg oral tablet: 0.5 tab(s) orally every 6 hours as needed for Severe Pain (7 - 10)  Vitamin D3: 1000 unit(s) orally once a day

## 2024-05-28 NOTE — DISCHARGE NOTE PROVIDER - NSDCCPTREATMENT_GEN_ALL_CORE_FT
PRINCIPAL PROCEDURE  Procedure: Intramedullary nailing of femur  Findings and Treatment: Diet: Continue regular diet upon discharge.   Activity: No heavy lifting > 25 lbs for 4 weeks. Avoid straining or excessive activity x 6 weeks.   -Continue to use your walker when ambulating until your postoperative follow up appointment.   Dressings: Keep dressing clean, dry, and intact. Your doctor will remove your bandage at your post-operative follow up appointment.   Other Care:   -You may shower when you get home but DO NOT soak dressing and/or incision. The water may run over your dressing/incision but DO NOT let the water directly hit your dressing/incision (take a shower with your wound away from the direct stream of water). NO hot tubes, NO bath tubs, NO swimming pools.   -Elevate your operative leg 2 feet above heart level for 2 hours in the morning, 2 hours in the afternoon, and 2 hours in the evening.   -Apply ice for 20min every time you elevate.   -Sit for 90 min/day: 45mins x2 or 30min x3  -DO NOT sit for more than the 90min/day. Walk or lay down when not elevating your leg.   -DO NOT place the elevation pillow behind your knees. Only place it under your calf and heel.   -DO NOT bend more than 45 degrees at the waist  Pain control:   Standing:         -Acetaminophen 500mg - 2 tabs every 8 hours  As needed:        -Tramadol 50mg - 1 tab every 6 hours - Take only if needed for MODERATE pain       -oxycodone 5mg - 1 tab every 4-6 hours - Take only if needed for SEVERE or BREAKTHROUGH pain  Oxycodone and Tramadol have been sent to your pharmacy. Please do not drive, operate machinery, or make important decisions while taking these medications.      SECONDARY PROCEDURE  Procedure: Intramedullary nailing of femur  Findings and Treatment:

## 2024-05-28 NOTE — DISCHARGE NOTE PROVIDER - HOSPITAL COURSE
This is a 89 yo Female with PMH of HTN, HLD, arthritis who presents to [facility] for orthopedic surgery. Patient s/p right IMN with Dr. Abarca on 5/25/24. Patient tolerated the procedure well without any intraoperative complications. Patient tolerated physical therapy, weight bearing as tolerated and pain was controlled. Seen by medical attending for continuity of care and management and cleared for safe discharge. As per surgeon, the patient is stable and ready for discharge. DO NOT take any NSAIDS (motrin, advil, ibuprofen, aleve), Aspirin, Anti-inflammatory medications unless instructed by your orthopedic surgeon to continue. Avoid any heavy lifting, bending, squatting, or twisting motions. Keep dressing/incision clean, dry and intact, may remove dressing two days after discharge and leave incision open to air. Any sutures/staples to be removed on post-op day #14 at your office visit. Please follow up with your PMD for continuity of care and management as medications may have changed.   This is a 89 yo Female with PMH of HTN, HLD, arthritis who presents to [facility] for orthopedic surgery. Patient s/p right IMN with Dr. Abarca on 5/25/24. Patient tolerated the procedure well without any intraoperative complications. Patient tolerated physical therapy, weight bearing as tolerated and pain was controlled. Seen by medical attending for continuity of care and management and cleared for safe discharge. As per surgeon, the patient is stable and ready for discharge. DO NOT take any NSAIDS (motrin, advil, ibuprofen, aleve), Aspirin, Anti-inflammatory medications unless instructed by your orthopedic surgeon to continue. Avoid any heavy lifting, bending, squatting, or twisting motions. Keep dressing/incision clean, dry and intact, may remove dressing two days after discharge and leave incision open to air. Any sutures/staples to be removed on post-op day #14 at your office visit. Please follow up with your PMD for continuity of care and management as medications may have changed.  Pt had BLE ultrasound on 5/29----------- This is a 89 yo Female with PMH of HTN, HLD, arthritis who presents to [facility] for orthopedic surgery. Patient s/p right IMN with Dr. Abarca on 5/25/24. Patient tolerated the procedure well without any intraoperative complications. Patient tolerated physical therapy, weight bearing as tolerated and pain was controlled. Pt had BLLE ultrasound on 5/29 with negative result. Seen by medical attending for continuity of care and management and cleared for safe discharge. As per surgeon, the patient is stable and ready for discharge. DO NOT take any NSAIDS (motrin, advil, ibuprofen, aleve), Aspirin, Anti-inflammatory medications unless instructed by your orthopedic surgeon to continue. Avoid any heavy lifting, bending, squatting, or twisting motions. Keep dressing/incision clean, dry and intact, may remove dressing two days after discharge and leave incision open to air. Any sutures/staples to be removed on post-op day #14 at your office visit. Please follow up with your PMD for continuity of care and management as medications may have changed.

## 2024-05-28 NOTE — PROGRESS NOTE ADULT - PROBLEM SELECTOR PLAN 9
DVT ppx per primary team  Regular diet  Dispo pending PT
DVT ppx per primary team  Regular diet  Dispo to [facility] pending medical stability (DIMITRI and anemia)
DVT ppx per primary team  Regular diet  Dispo to [facility] pending medical stability (DIMITRI and anemia)

## 2024-05-28 NOTE — PROGRESS NOTE ADULT - PROBLEM SELECTOR PLAN 6
Presented with leukocytosis and tachycardia. Both could be explained by hip fracture. No other localizing signs or symptoms of infection. UA without concern for infection and CXR without evidence of pneumonia on admission.

## 2024-05-28 NOTE — DISCHARGE NOTE NURSING/CASE MANAGEMENT/SOCIAL WORK - NSDCPECAREGIVERED_GEN_ALL_CORE
Medline and carenotes for surgical procedure IM Nail, Hip fracture, Incison care, pain mgt, ASA, Oxy IR, as well as DC Medications and side effects literature for patient reference. hip fracture, IMN hip fracture, pain after surgery, incision action plan

## 2024-05-28 NOTE — PROGRESS NOTE ADULT - TIME BILLING
documentation in [place], reviewing chart and coordinating care with patient/resident and interdisciplinary staff (such as , social workers, etc) as well as reviewing vitals, laboratory data, radiology, medication list, consultants' recommendations and prior records. Interventions were performed as documented above.
Reviewed lab data, radiology results, consultants' recommendations, documentation in [place], discussed with family, ACP, interdisciplinary staff and/or intervention were performed.
documentation in [place], reviewing chart and coordinating care with patient/resident and interdisciplinary staff (such as , social workers, etc) as well as reviewing vitals, laboratory data, radiology, medication list, consultants' recommendations and prior records. Interventions were performed as documented above.

## 2024-05-28 NOTE — DISCHARGE NOTE NURSING/CASE MANAGEMENT/SOCIAL WORK - NSDCPEFALRISK_GEN_ALL_CORE
For information on Fall & Injury Prevention, visit: [email or web address] OR  [email or web address] OR  https://www.cdc.gov/steadi/patient.html

## 2024-05-28 NOTE — PROGRESS NOTE ADULT - PROBLEM SELECTOR PLAN 3
Described feeling of incomplete emptying on admission, but bladder scans have not shown retention.

## 2024-05-28 NOTE — DISCHARGE NOTE PROVIDER - NSDCCPCAREPLAN_GEN_ALL_CORE_FT
PRINCIPAL DISCHARGE DIAGNOSIS  Diagnosis: Hip fracture, right  Assessment and Plan of Treatment:   Other Medications: (Standing)  -Aspirin (Enteric Coated) 325mg every 12 hours - to prevent blood clots (for 6 weeks post operatively.)  -Protonix 40mg - 1 tab every 24 hours - to prevent stomach irritation/ulcers  -Senna 8.6mg - 2 pills every 24 hours - stool softener  -Miralax 17g - daily - constipation   Follow up: Please follow up at your prescheduled post-operative follow up appointment with Dr. Abarca for 2 weeks after hospital discharge. Please call with any questions or concerns including fevers, worsening pain, pus from the wounds, redness of the skin and difficulty breathing or heaviness in the chest at [phone].   Please also follow up with your PCP within 1 week to evaluate blood pressure and to approve resuming home medications.

## 2024-05-28 NOTE — DISCHARGE NOTE PROVIDER - CARE PROVIDER_API CALL
Darien Abarca  Orthopaedic Surgery  [address], [address]  [place] [address]  Phone: [phone]  Fax: [phone]  Follow Up Time:

## 2024-05-28 NOTE — PROGRESS NOTE ADULT - PROBLEM SELECTOR PLAN 4
Cr 1.75 (5/26) from 1.61 on presentation. Unclear baseline. Per review of [facility] records has ranged from 1.3-1.5 recently. Likely pre-renal in setting of recent illness, NPO for OR. Received 500 cc IVF and 2 units pRBC on 5/26.  - Repeat BMP ordered, follow-up  - Encourage oral hydration  - Hold home irbesartan Cr 1.75 (5/26) from 1.61 on presentation. Unclear baseline. Per review of [facility] records has ranged from 1.3-1.5 recently. Likely pre-renal in setting of recent illness, NPO for OR. Received 500 cc IVF and 2 units pRBC on 5/26.  - check urine electrolytes to r/o dehydration   - Encourage oral hydration  - Hold home irbesartan Cr 1.75 (5/26) from 1.61 on presentation. Unclear baseline. Per review of [facility] records has ranged from 1.3-1.5 recently. Likely pre-renal in setting of recent illness, NPO for OR. Received 500 cc IVF and 2 units pRBC on 5/26.  - check urine electrolytes to r/o dehydration   -start NS @ 70ml/hr  - Encourage oral hydration  - Hold home irbesartan

## 2024-05-28 NOTE — PROGRESS NOTE ADULT - SUBJECTIVE AND OBJECTIVE BOX
Patient is a 89y old  Female who presents with a chief complaint of R Hip Fx (28 May 2024 10:01)      SUBJECTIVE / OVERNIGHT EVENTS:    MEDICATIONS  (STANDING):  acetaminophen     Tablet .. 975 milliGRAM(s) Oral every 8 hours  aspirin enteric coated 81 milliGRAM(s) Oral every 12 hours  atenolol  Tablet 50 milliGRAM(s) Oral daily  hydrochlorothiazide 25 milliGRAM(s) Oral daily  polyethylene glycol 3350 17 Gram(s) Oral daily  povidone iodine 5% Nasal Swab 1 Application(s) Both Nostrils once  senna 2 Tablet(s) Oral at bedtime    MEDICATIONS  (PRN):  magnesium hydroxide Suspension 30 milliLiter(s) Oral daily PRN Constipation  melatonin 3 milliGRAM(s) Oral at bedtime PRN Insomnia  oxyCODONE    IR 2.5 milliGRAM(s) Oral every 4 hours PRN Moderate Pain (4 - 6)  traMADol 50 milliGRAM(s) Oral every 6 hours PRN Severe Pain (7 - 10)  zolpidem 5 milliGRAM(s) Oral at bedtime PRN Insomnia      Vital Signs Last 24 Hrs  T(C): 36.3 (28 May 2024 09:29), Max: 36.8 (28 May 2024 06:38)  T(F): 97.3 (28 May 2024 09:29), Max: 98.3 (28 May 2024 06:38)  HR: 70 (28 May 2024 09:29) (61 - 72)  BP: 182/64 (28 May 2024 09:30) (137/62 - 182/64)  BP(mean): --  RR: 17 (28 May 2024 09:29) (16 - 18)  SpO2: 97% (28 May 2024 09:29) (97% - 100%)    Parameters below as of 28 May 2024 09:29  Patient On (Oxygen Delivery Method): room air      CAPILLARY BLOOD GLUCOSE        I&O's Summary    27 May 2024 07:01  -  28 May 2024 07:00  --------------------------------------------------------  IN: 0 mL / OUT: 250 mL / NET: -250 mL    28 May 2024 07:01  -  28 May 2024 12:50  --------------------------------------------------------  IN: 0 mL / OUT: 200 mL / NET: -200 mL        PHYSICAL EXAM:  GENERAL: NAD, well-developed  HEAD:  Atraumatic, Normocephalic  EYES: EOMI, PERRLA, conjunctiva and sclera clear  NECK: Supple, No JVD  CHEST/LUNG: Clear to auscultation bilaterally; No wheeze  HEART: Regular rate and rhythm; No murmurs, rubs, or gallops  ABDOMEN: Soft, Nontender, Nondistended; Bowel sounds present  EXTREMITIES:  2+ Peripheral Pulses, No clubbing, cyanosis, or edema  PSYCH: AAOx3  NEUROLOGY: non-focal  SKIN: No rashes or lesions    LABS:                        9.7    11.60 )-----------( 297      ( 28 May 2024 05:19 )             29.6     05-28    137  |  104  |  41<H>  ----------------------------<  101<H>  4.8   |  22  |  1.68<H>    Ca    8.9      28 May 2024 05:19            Urinalysis Basic - ( 28 May 2024 05:19 )    Color: x / Appearance: x / SG: x / pH: x  Gluc: 101 mg/dL / Ketone: x  / Bili: x / Urobili: x   Blood: x / Protein: x / Nitrite: x   Leuk Esterase: x / RBC: x / WBC x   Sq Epi: x / Non Sq Epi: x / Bacteria: x        RADIOLOGY & ADDITIONAL TESTS:    Imaging Personally Reviewed:    Consultant(s) Notes Reviewed:      Care Discussed with Consultants/Other Providers:   Patient is a 89y old  Female who presents with a chief complaint of R Hip Fx (28 May 2024 10:01)      SUBJECTIVE / OVERNIGHT EVENTS:  Patient says she feels dehydrated and she knows she isn't drinking enough water. She says her pain is controlled with pain meds. She hasn't had a BM in 4 days but also hasn't eaten much either. Patient has no new complaints. Denies cp, SOB, abdominal pain, N/V/D     MEDICATIONS  (STANDING):  acetaminophen     Tablet .. 975 milliGRAM(s) Oral every 8 hours  aspirin enteric coated 81 milliGRAM(s) Oral every 12 hours  atenolol  Tablet 50 milliGRAM(s) Oral daily  hydrochlorothiazide 25 milliGRAM(s) Oral daily  polyethylene glycol 3350 17 Gram(s) Oral daily  povidone iodine 5% Nasal Swab 1 Application(s) Both Nostrils once  senna 2 Tablet(s) Oral at bedtime    MEDICATIONS  (PRN):  magnesium hydroxide Suspension 30 milliLiter(s) Oral daily PRN Constipation  melatonin 3 milliGRAM(s) Oral at bedtime PRN Insomnia  oxyCODONE    IR 2.5 milliGRAM(s) Oral every 4 hours PRN Moderate Pain (4 - 6)  traMADol 50 milliGRAM(s) Oral every 6 hours PRN Severe Pain (7 - 10)  zolpidem 5 milliGRAM(s) Oral at bedtime PRN Insomnia      Vital Signs Last 24 Hrs  T(C): 36.3 (28 May 2024 09:29), Max: 36.8 (28 May 2024 06:38)  T(F): 97.3 (28 May 2024 09:29), Max: 98.3 (28 May 2024 06:38)  HR: 70 (28 May 2024 09:29) (61 - 72)  BP: 182/64 (28 May 2024 09:30) (137/62 - 182/64)  BP(mean): --  RR: 17 (28 May 2024 09:29) (16 - 18)  SpO2: 97% (28 May 2024 09:29) (97% - 100%)    Parameters below as of 28 May 2024 09:29  Patient On (Oxygen Delivery Method): room air      CAPILLARY BLOOD GLUCOSE        I&O's Summary    27 May 2024 07:01  -  28 May 2024 07:00  --------------------------------------------------------  IN: 0 mL / OUT: 250 mL / NET: -250 mL    28 May 2024 07:01  -  28 May 2024 12:50  --------------------------------------------------------  IN: 0 mL / OUT: 200 mL / NET: -200 mL        PHYSICAL EXAM:  GENERAL: NAD, well-developed  HEAD:  Atraumatic, Normocephalic  EYES: EOMI, PERRLA, conjunctiva and sclera clear  NECK: Supple, No JVD  CHEST/LUNG: Clear to auscultation bilaterally; No wheeze  HEART: Regular rate and rhythm; No murmurs, rubs, or gallops  ABDOMEN: Soft, Nontender, Nondistended; Bowel sounds present  EXTREMITIES:  2+ Peripheral Pulses, No clubbing, cyanosis, or edema  PSYCH: AAOx3  NEUROLOGY: non-focal  SKIN: No rashes or lesions    LABS:                        9.7    11.60 )-----------( 297      ( 28 May 2024 05:19 )             29.6     05-28    137  |  104  |  41<H>  ----------------------------<  101<H>  4.8   |  22  |  1.68<H>    Ca    8.9      28 May 2024 05:19            Urinalysis Basic - ( 28 May 2024 05:19 )    Color: x / Appearance: x / SG: x / pH: x  Gluc: 101 mg/dL / Ketone: x  / Bili: x / Urobili: x   Blood: x / Protein: x / Nitrite: x   Leuk Esterase: x / RBC: x / WBC x   Sq Epi: x / Non Sq Epi: x / Bacteria: x        RADIOLOGY & ADDITIONAL TESTS:    Imaging Personally Reviewed:    Consultant(s) Notes Reviewed:      Care Discussed with Consultants/Other Providers:

## 2024-05-28 NOTE — PROGRESS NOTE ADULT - PROBLEM SELECTOR PLAN 8
Confirmed medication list with assisted living facility on 5/26: oxybutynin 5mg HS, senna 2 tabs HS, Lunesta HS, omeprazole 20 mg daily, HCTZ 25 mg daily, irbesartan 300 mg daily, vitamin B 12 1000 mcg daily, vitamin D3 1000 IU daily, atenolol 50 mg daily.

## 2024-05-28 NOTE — PROGRESS NOTE ADULT - SUBJECTIVE AND OBJECTIVE BOX
SUBJECTIVE:   Patient seen and examined at bedside. Pt doing generally well.    Pain well controlled with medication. Denies any respiratory symptoms       OBJECTIVE:   Vital Signs Last 24 Hrs  T(C): 36.7 (27 May 2024 22:01), Max: 36.7 (27 May 2024 13:32)  T(F): 98.1 (27 May 2024 22:01), Max: 98.1 (27 May 2024 22:01)  HR: 72 (27 May 2024 22:01) (68 - 77)  BP: 151/58 (27 May 2024 22:01) (149/47 - 193/64)  BP(mean): --  RR: 17 (27 May 2024 22:01) (16 - 17)  SpO2: 99% (27 May 2024 22:01) (96% - 100%)    Parameters below as of 27 May 2024 22:01  Patient On (Oxygen Delivery Method): nasal cannula        General: NAD  Neuro: Alert  and oriented  Resp: Non-labored breathing, no accessory muscle use  Right Lower extremity:         Skin intact         Dressing: clean/dry with quater size strikethrough           Sensation: SILT         Motor exam: + TA/GS/EHL/FHL       compartments soft         warm well perfused       MSK: Moving all other extremities spontaneously    LABS:                        10.4   12.27 )-----------( 280      ( 27 May 2024 09:30 )             30.3     05-27    136  |  103  |  35<H>  ----------------------------<  176<H>  5.1   |  20<L>  |  1.64<H>    Ca    8.6      27 May 2024 09:30          MEDS:  MEDICATIONS  (PRN):  magnesium hydroxide Suspension 30 milliLiter(s) Oral daily PRN Constipation  melatonin 3 milliGRAM(s) Oral at bedtime PRN Insomnia  oxyCODONE    IR 2.5 milliGRAM(s) Oral every 4 hours PRN Moderate Pain (4 - 6)  traMADol 50 milliGRAM(s) Oral every 6 hours PRN Severe Pain (7 - 10)  zolpidem 5 milliGRAM(s) Oral at bedtime PRN Insomnia    acetaminophen     Tablet .. 975 milliGRAM(s) Oral every 8 hours  aspirin enteric coated 81 milliGRAM(s) Oral every 12 hours  atenolol  Tablet 50 milliGRAM(s) Oral daily  chlorhexidine 2% Cloths 1 Application(s) Topical <User Schedule>  hydrochlorothiazide 25 milliGRAM(s) Oral daily  magnesium hydroxide Suspension 30 milliLiter(s) Oral daily PRN  melatonin 3 milliGRAM(s) Oral at bedtime PRN  oxyCODONE    IR 2.5 milliGRAM(s) Oral every 4 hours PRN  polyethylene glycol 3350 17 Gram(s) Oral daily  povidone iodine 5% Nasal Swab 1 Application(s) Both Nostrils once  senna 2 Tablet(s) Oral at bedtime  traMADol 50 milliGRAM(s) Oral every 6 hours PRN  zolpidem 5 milliGRAM(s) Oral at bedtime PRN

## 2024-05-29 VITALS — DIASTOLIC BLOOD PRESSURE: 47 MMHG | SYSTOLIC BLOOD PRESSURE: 143 MMHG

## 2024-05-29 LAB
ANION GAP SERPL CALC-SCNC: 12 MMOL/L — SIGNIFICANT CHANGE UP (ref 7–14)
BASOPHILS # BLD AUTO: 0.08 K/UL — SIGNIFICANT CHANGE UP (ref 0–0.2)
BASOPHILS NFR BLD AUTO: 0.7 % — SIGNIFICANT CHANGE UP (ref 0–2)
BUN SERPL-MCNC: 47 MG/DL — HIGH (ref 7–23)
CALCIUM SERPL-MCNC: 8.5 MG/DL — SIGNIFICANT CHANGE UP (ref 8.4–10.5)
CHLORIDE SERPL-SCNC: 105 MMOL/L — SIGNIFICANT CHANGE UP (ref 98–107)
CO2 SERPL-SCNC: 22 MMOL/L — SIGNIFICANT CHANGE UP (ref 22–31)
CREAT SERPL-MCNC: 1.67 MG/DL — HIGH (ref 0.5–1.3)
EGFR: 28 ML/MIN/1.73M2 — LOW
EOSINOPHIL # BLD AUTO: 0.23 K/UL — SIGNIFICANT CHANGE UP (ref 0–0.5)
EOSINOPHIL NFR BLD AUTO: 2.1 % — SIGNIFICANT CHANGE UP (ref 0–6)
GLUCOSE SERPL-MCNC: 106 MG/DL — HIGH (ref 70–99)
HCT VFR BLD CALC: 27.9 % — LOW (ref 34.5–45)
HGB BLD-MCNC: 9.1 G/DL — LOW (ref 11.5–15.5)
IANC: 7.09 K/UL — SIGNIFICANT CHANGE UP (ref 1.8–7.4)
IMM GRANULOCYTES NFR BLD AUTO: 1.1 % — HIGH (ref 0–0.9)
LYMPHOCYTES # BLD AUTO: 18.8 % — SIGNIFICANT CHANGE UP (ref 13–44)
LYMPHOCYTES # BLD AUTO: 2.08 K/UL — SIGNIFICANT CHANGE UP (ref 1–3.3)
MCHC RBC-ENTMCNC: 27.7 PG — SIGNIFICANT CHANGE UP (ref 27–34)
MCHC RBC-ENTMCNC: 32.6 GM/DL — SIGNIFICANT CHANGE UP (ref 32–36)
MCV RBC AUTO: 85.1 FL — SIGNIFICANT CHANGE UP (ref 80–100)
MONOCYTES # BLD AUTO: 1.47 K/UL — HIGH (ref 0–0.9)
MONOCYTES NFR BLD AUTO: 13.3 % — SIGNIFICANT CHANGE UP (ref 2–14)
NEUTROPHILS # BLD AUTO: 7.09 K/UL — SIGNIFICANT CHANGE UP (ref 1.8–7.4)
NEUTROPHILS NFR BLD AUTO: 64 % — SIGNIFICANT CHANGE UP (ref 43–77)
NRBC # BLD: 0 /100 WBCS — SIGNIFICANT CHANGE UP (ref 0–0)
NRBC # FLD: 0 K/UL — SIGNIFICANT CHANGE UP (ref 0–0)
PLATELET # BLD AUTO: 354 K/UL — SIGNIFICANT CHANGE UP (ref 150–400)
POTASSIUM SERPL-MCNC: 4.5 MMOL/L — SIGNIFICANT CHANGE UP (ref 3.5–5.3)
POTASSIUM SERPL-SCNC: 4.5 MMOL/L — SIGNIFICANT CHANGE UP (ref 3.5–5.3)
RBC # BLD: 3.28 M/UL — LOW (ref 3.8–5.2)
RBC # FLD: 15.3 % — HIGH (ref 10.3–14.5)
SODIUM SERPL-SCNC: 139 MMOL/L — SIGNIFICANT CHANGE UP (ref 135–145)
WBC # BLD: 11.07 K/UL — HIGH (ref 3.8–10.5)
WBC # FLD AUTO: 11.07 K/UL — HIGH (ref 3.8–10.5)

## 2024-05-29 PROCEDURE — 93970 EXTREMITY STUDY: CPT | Mod: 26

## 2024-05-29 RX ORDER — HYDROCHLOROTHIAZIDE 25 MG
1 TABLET ORAL
Qty: 0 | Refills: 0 | DISCHARGE
Start: 2024-05-29

## 2024-05-29 RX ORDER — OXYCODONE HYDROCHLORIDE 5 MG/1
2.5 TABLET ORAL
Qty: 0 | Refills: 0 | DISCHARGE
Start: 2024-05-29

## 2024-05-29 RX ORDER — LANOLIN ALCOHOL/MO/W.PET/CERES
1 CREAM (GRAM) TOPICAL
Qty: 0 | Refills: 0 | DISCHARGE
Start: 2024-05-29

## 2024-05-29 RX ORDER — SENNA PLUS 8.6 MG/1
2 TABLET ORAL
Qty: 0 | Refills: 0 | DISCHARGE
Start: 2024-05-29

## 2024-05-29 RX ORDER — ATENOLOL 25 MG/1
1 TABLET ORAL
Qty: 0 | Refills: 0 | DISCHARGE
Start: 2024-05-29

## 2024-05-29 RX ORDER — TRAMADOL HYDROCHLORIDE 50 MG/1
0.5 TABLET ORAL
Qty: 0 | Refills: 0 | DISCHARGE
Start: 2024-05-29

## 2024-05-29 RX ORDER — ASPIRIN/CALCIUM CARB/MAGNESIUM 324 MG
1 TABLET ORAL
Qty: 0 | Refills: 0 | DISCHARGE
Start: 2024-05-29

## 2024-05-29 RX ORDER — ATENOLOL 25 MG/1
1 TABLET ORAL
Refills: 0 | DISCHARGE

## 2024-05-29 RX ORDER — ACETAMINOPHEN 500 MG
3 TABLET ORAL
Qty: 0 | Refills: 0 | DISCHARGE
Start: 2024-05-29

## 2024-05-29 RX ADMIN — TRAMADOL HYDROCHLORIDE 50 MILLIGRAM(S): 50 TABLET ORAL at 11:03

## 2024-05-29 RX ADMIN — TRAMADOL HYDROCHLORIDE 50 MILLIGRAM(S): 50 TABLET ORAL at 10:03

## 2024-05-29 RX ADMIN — Medication 81 MILLIGRAM(S): at 10:03

## 2024-05-29 RX ADMIN — Medication 975 MILLIGRAM(S): at 05:44

## 2024-05-29 RX ADMIN — Medication 975 MILLIGRAM(S): at 06:44

## 2024-05-29 RX ADMIN — Medication 975 MILLIGRAM(S): at 14:58

## 2024-05-29 RX ADMIN — ATENOLOL 50 MILLIGRAM(S): 25 TABLET ORAL at 15:18

## 2024-05-29 NOTE — DIETITIAN INITIAL EVALUATION ADULT - OTHER INFO
Nutrition assessment for BMI <18.    89 y.o. Female w/ Hx  HTN, OA, CKD (baseline Cr 1.3-1.5) presenting after fall with R hip fracture s/p IMN on 5/25 with course notable for DIMITRI on CKD.    Patient with fair PO intake as being recorded in flowsheets; patient reported appetite is fair at best. Patient stated she is a simple eater and prefers bland foods.  Per patient, usual body weight 96 pounds, unsure of recent weight loss. Denies any GI distress i.e. nausea, vomiting, diarrhea.  No reported chewing or swallowing difficulties. Reviewed nutritional strategies to optimize nutrition, patient amenable to accepting oral supplement i.e. Ensure.    Inpatient Weights - 5/25 - 41.1kg; 5/26 - 46.8kg; 5/29 - 47.5kg; HIE weights Nov 2023 - 45.2kg; June 2023 - 43.7kg.

## 2024-05-29 NOTE — PROGRESS NOTE ADULT - ASSESSMENT
89 y/o  Female sp R hip IMN . Patient is hemodynamically stable; recovering well from orthopaedic standpoint.    PLAN  -    Multimodal Pain control  -    DVT ppx: ASA 81BID  -    Resumed home meds  -    Weight bearing status: WBAT   -    PT/OT  -    Dispo: [facility] - cleared for discharge from ortho standpoint  
ASSESSMENT & PLAN  89yFemale w/ R IT fx.  -NWB RLE, bedrest  -OR today 5/25  -NPO past midnight, IVF  -hold chemical DVT ppx for OR; SCDs OK  -pain control  -ice/cold compress    For all questions related to patient care, please reach out to the on-call team via the pager.     Ghazal Ribera, PGY 2  Orthopaedic Surgery  [facility] [phone]  [facility] [id number]  [facility] [phone]  
89W w/ HTN, OA, CKD (baseline Cr 1.3-1.5) presenting after fall with R hip fracture s/p IMN on 5/25 with course notable for DIMITRI on CKD.
A/P: 89yFemale POD0 s/p R IMN, now POD#1 recovering well    - Pain control  - WBAT RLE  - DVT ppx: A81 BID  - PT/OT  - OOB/AAT  - Regular diet  - Dispo planning pending PT ahmet Lopez MD  Orthopaedic Surgery Resident    For all questions, please reach out via the following numbers for the on-call resident; do not reach out via Teams.  [facility] [id number]  [facility]        [id number]  [facility]  [id number]/ [phone]  
A/P: 89yFemale POD0 s/p R IMN, recovering well    - Pain control  - WBAT RLE  - DVT ppx: A81 BID  - PT/OT  - OOB/AAT  - Regular diet  -FU AM CBC  - Dispo planning pending PT eval        For all questions, please reach out via the following numbers for the on-call resident; do not reach out via Teams.  [facility] [id number]  [facility]        [id number]  [facility]  [id number]/ [phone]  
Pt is a 89 y/o  Female sp R hip IMN . Patient is hemodynamically stable; recovering well from orthopaedic standpoint.  -    Multimodal Pain control  -    DVT ppx: ASA 81BID  -    Resumed home meds  -    Check AM labs  -    Weight bearing status: WBAT   -    PT/OT  -    Dispo: [facility] - cleared for discharge from ortho standpoint      Orthopaedic Surgery  [facility] [id number]  [facility]        [id number]  [facility]  [id number]/ [phone]
89W w/ HTN, OA, CKD (baseline Cr 1.3-1.5) presenting after fall with R hip fracture s/p IMN on 5/25 with course notable for DIMITRI on CKD.
89 y.o. Female w/ Hx  HTN, OA, CKD (baseline Cr 1.3-1.5) presenting after fall with R hip fracture s/p IMN on 5/25 with course notable for DIMITRI on CKD.

## 2024-05-29 NOTE — PROGRESS NOTE ADULT - SUBJECTIVE AND OBJECTIVE BOX
ORTHOPAEDIC PROGRESS NOTE    SUBJECTIVE:  Pt seen and examined at bedside this am.  Doing well.  No acute events overnight.  Pt states pain is well controlled    OBJECTIVE:  Vital Signs Last 24 Hrs  T(C): 36.4 (29 May 2024 01:16), Max: 36.8 (28 May 2024 06:38)  T(F): 97.5 (29 May 2024 01:16), Max: 98.3 (28 May 2024 06:38)  HR: 70 (29 May 2024 01:16) (61 - 80)  BP: 141/62 (29 May 2024 01:16) (120/47 - 182/64)  BP(mean): --  RR: 17 (29 May 2024 01:16) (17 - 18)  SpO2: 97% (29 May 2024 01:16) (95% - 98%)    Parameters below as of 29 May 2024 01:16  Patient On (Oxygen Delivery Method): nasal cannula        Physical Exam:  General: NAD; resting comfrotably in bed  Resp: non labored  RLE:  Skin intact  Dressing: clean/dry/intact  Sensation: SILT  Motor exam: + TA/GS/EHL/FHL  Compartments soft  Warm well perfused     LABS                        9.7    11.60 )-----------( 297      ( 28 May 2024 05:19 )             29.6       05-28    137  |  104  |  41<H>  ----------------------------<  101<H>  4.8   |  22  |  1.68<H>    Ca    8.9      28 May 2024 05:19            I&O's Summary    27 May 2024 07:01  -  28 May 2024 07:00  --------------------------------------------------------  IN: 0 mL / OUT: 250 mL / NET: -250 mL    28 May 2024 07:01  -  29 May 2024 06:08  --------------------------------------------------------  IN: 0 mL / OUT: 200 mL / NET: -200 mL

## 2024-05-29 NOTE — DIETITIAN INITIAL EVALUATION ADULT - PERTINENT LABORATORY DATA
05-29    139  |  105  |  47<H>  ----------------------------<  106<H>  4.5   |  22  |  1.67<H>    Ca    8.5      29 May 2024 05:25

## 2024-05-29 NOTE — DIETITIAN INITIAL EVALUATION ADULT - PERTINENT MEDS FT
MEDICATIONS  (STANDING):  acetaminophen     Tablet .. 975 milliGRAM(s) Oral every 8 hours  aspirin enteric coated 81 milliGRAM(s) Oral every 12 hours  atenolol  Tablet 50 milliGRAM(s) Oral daily  hydrochlorothiazide 25 milliGRAM(s) Oral daily  polyethylene glycol 3350 17 Gram(s) Oral daily  povidone iodine 5% Nasal Swab 1 Application(s) Both Nostrils once  senna 2 Tablet(s) Oral at bedtime    MEDICATIONS  (PRN):  hydrALAZINE Injectable 10 milliGRAM(s) IV Push every 6 hours PRN HTN  magnesium hydroxide Suspension 30 milliLiter(s) Oral daily PRN Constipation  melatonin 3 milliGRAM(s) Oral at bedtime PRN Insomnia  oxyCODONE    IR 2.5 milliGRAM(s) Oral every 4 hours PRN Moderate Pain (4 - 6)  traMADol 50 milliGRAM(s) Oral every 6 hours PRN Severe Pain (7 - 10)  zolpidem 5 milliGRAM(s) Oral at bedtime PRN Insomnia

## 2024-06-10 ENCOUNTER — APPOINTMENT (OUTPATIENT)
Dept: ORTHOPEDIC SURGERY | Facility: CLINIC | Age: 89
End: 2024-06-10
Payer: MEDICARE

## 2024-06-10 VITALS
WEIGHT: 106 LBS | BODY MASS INDEX: 23.84 KG/M2 | HEIGHT: 56 IN | HEART RATE: 66 BPM | SYSTOLIC BLOOD PRESSURE: 101 MMHG | DIASTOLIC BLOOD PRESSURE: 82 MMHG

## 2024-06-10 DIAGNOSIS — S72.141D DISPLACED INTERTROCHANTERIC FRACTURE OF RIGHT FEMUR, SUBSEQUENT ENCOUNTER FOR CLOSED FRACTURE WITH ROUTINE HEALING: ICD-10-CM

## 2024-06-10 PROCEDURE — 72170 X-RAY EXAM OF PELVIS: CPT | Mod: RT

## 2024-06-10 PROCEDURE — 73502 X-RAY EXAM HIP UNI 2-3 VIEWS: CPT | Mod: 26

## 2024-06-10 PROCEDURE — 99024 POSTOP FOLLOW-UP VISIT: CPT

## 2024-06-15 ENCOUNTER — INPATIENT (INPATIENT)
Facility: HOSPITAL | Age: 89
LOS: 5 days | Discharge: NURSING, REHAB OR LONG-TERM CARE | DRG: 641 | End: 2024-06-21
Attending: HOSPITALIST | Admitting: STUDENT IN AN ORGANIZED HEALTH CARE EDUCATION/TRAINING PROGRAM
Payer: MEDICARE

## 2024-06-15 VITALS
TEMPERATURE: 98 F | SYSTOLIC BLOOD PRESSURE: 176 MMHG | RESPIRATION RATE: 18 BRPM | OXYGEN SATURATION: 94 % | HEART RATE: 80 BPM | WEIGHT: 110.89 LBS | HEIGHT: 65 IN | DIASTOLIC BLOOD PRESSURE: 76 MMHG

## 2024-06-15 DIAGNOSIS — Z90.89 ACQUIRED ABSENCE OF OTHER ORGANS: Chronic | ICD-10-CM

## 2024-06-15 DIAGNOSIS — E87.5 HYPERKALEMIA: ICD-10-CM

## 2024-06-15 DIAGNOSIS — Z98.890 OTHER SPECIFIED POSTPROCEDURAL STATES: Chronic | ICD-10-CM

## 2024-06-15 LAB
ALBUMIN SERPL ELPH-MCNC: 4.5 G/DL — SIGNIFICANT CHANGE UP (ref 3.3–5)
ALP SERPL-CCNC: 129 U/L — HIGH (ref 40–120)
ALT FLD-CCNC: 6 U/L — LOW (ref 10–45)
ANION GAP SERPL CALC-SCNC: 16 MMOL/L — SIGNIFICANT CHANGE UP (ref 5–17)
AST SERPL-CCNC: 15 U/L — SIGNIFICANT CHANGE UP (ref 10–40)
BASOPHILS # BLD AUTO: 0.04 K/UL — SIGNIFICANT CHANGE UP (ref 0–0.2)
BASOPHILS NFR BLD AUTO: 0.2 % — SIGNIFICANT CHANGE UP (ref 0–2)
BILIRUB SERPL-MCNC: 0.3 MG/DL — SIGNIFICANT CHANGE UP (ref 0.2–1.2)
BUN SERPL-MCNC: 75 MG/DL — HIGH (ref 7–23)
CALCIUM SERPL-MCNC: 10.4 MG/DL — SIGNIFICANT CHANGE UP (ref 8.4–10.5)
CHLORIDE SERPL-SCNC: 99 MMOL/L — SIGNIFICANT CHANGE UP (ref 96–108)
CO2 SERPL-SCNC: 18 MMOL/L — LOW (ref 22–31)
CREAT SERPL-MCNC: 2.28 MG/DL — HIGH (ref 0.5–1.3)
EGFR: 19 ML/MIN/1.73M2 — LOW
EOSINOPHIL # BLD AUTO: 0.05 K/UL — SIGNIFICANT CHANGE UP (ref 0–0.5)
EOSINOPHIL NFR BLD AUTO: 0.3 % — SIGNIFICANT CHANGE UP (ref 0–6)
GLUCOSE SERPL-MCNC: 124 MG/DL — HIGH (ref 70–99)
HCT VFR BLD CALC: 36.6 % — SIGNIFICANT CHANGE UP (ref 34.5–45)
HGB BLD-MCNC: 11.2 G/DL — LOW (ref 11.5–15.5)
IMM GRANULOCYTES NFR BLD AUTO: 0.5 % — SIGNIFICANT CHANGE UP (ref 0–0.9)
LYMPHOCYTES # BLD AUTO: 1.62 K/UL — SIGNIFICANT CHANGE UP (ref 1–3.3)
LYMPHOCYTES # BLD AUTO: 8.5 % — LOW (ref 13–44)
MCHC RBC-ENTMCNC: 27.7 PG — SIGNIFICANT CHANGE UP (ref 27–34)
MCHC RBC-ENTMCNC: 30.6 GM/DL — LOW (ref 32–36)
MCV RBC AUTO: 90.6 FL — SIGNIFICANT CHANGE UP (ref 80–100)
MONOCYTES # BLD AUTO: 0.93 K/UL — HIGH (ref 0–0.9)
MONOCYTES NFR BLD AUTO: 4.9 % — SIGNIFICANT CHANGE UP (ref 2–14)
NEUTROPHILS # BLD AUTO: 16.23 K/UL — HIGH (ref 1.8–7.4)
NEUTROPHILS NFR BLD AUTO: 85.6 % — HIGH (ref 43–77)
NRBC # BLD: 0 /100 WBCS — SIGNIFICANT CHANGE UP (ref 0–0)
PLATELET # BLD AUTO: 840 K/UL — HIGH (ref 150–400)
POTASSIUM SERPL-MCNC: 6.2 MMOL/L — CRITICAL HIGH (ref 3.5–5.3)
POTASSIUM SERPL-SCNC: 6.2 MMOL/L — CRITICAL HIGH (ref 3.5–5.3)
PROT SERPL-MCNC: 8.3 G/DL — SIGNIFICANT CHANGE UP (ref 6–8.3)
RBC # BLD: 4.04 M/UL — SIGNIFICANT CHANGE UP (ref 3.8–5.2)
RBC # FLD: 18 % — HIGH (ref 10.3–14.5)
SODIUM SERPL-SCNC: 133 MMOL/L — LOW (ref 135–145)
WBC # BLD: 18.97 K/UL — HIGH (ref 3.8–10.5)
WBC # FLD AUTO: 18.97 K/UL — HIGH (ref 3.8–10.5)

## 2024-06-15 PROCEDURE — 99285 EMERGENCY DEPT VISIT HI MDM: CPT | Mod: GC

## 2024-06-15 RX ORDER — ZALEPLON 10 MG/1
5 CAPSULE ORAL ONCE
Refills: 0 | Status: DISCONTINUED | OUTPATIENT
Start: 2024-06-15 | End: 2024-06-15

## 2024-06-15 RX ORDER — SODIUM ZIRCONIUM CYCLOSILICATE 10 G/10G
5 POWDER, FOR SUSPENSION ORAL ONCE
Refills: 0 | Status: COMPLETED | OUTPATIENT
Start: 2024-06-15 | End: 2024-06-15

## 2024-06-15 RX ORDER — SODIUM CHLORIDE 0.9 % (FLUSH) 0.9 %
500 SYRINGE (ML) INJECTION ONCE
Refills: 0 | Status: COMPLETED | OUTPATIENT
Start: 2024-06-15 | End: 2024-06-15

## 2024-06-15 RX ORDER — DEXTROSE 30 % IN WATER 30 %
50 VIAL (ML) INTRAVENOUS ONCE
Refills: 0 | Status: COMPLETED | OUTPATIENT
Start: 2024-06-15 | End: 2024-06-15

## 2024-06-15 RX ORDER — INSULIN REGULAR, HUMAN 100/ML
5 VIAL (ML) INJECTION ONCE
Refills: 0 | Status: COMPLETED | OUTPATIENT
Start: 2024-06-15 | End: 2024-06-15

## 2024-06-15 RX ORDER — DEXTROSE 30 % IN WATER 30 %
50 VIAL (ML) INTRAVENOUS ONCE
Refills: 0 | Status: DISCONTINUED | OUTPATIENT
Start: 2024-06-15 | End: 2024-06-15

## 2024-06-15 RX ORDER — CALCIUM GLUCONATE 98 MG/ML
2 INJECTION, SOLUTION INTRAVENOUS ONCE
Refills: 0 | Status: COMPLETED | OUTPATIENT
Start: 2024-06-15 | End: 2024-06-15

## 2024-06-15 RX ADMIN — Medication 5 UNIT(S): at 22:38

## 2024-06-15 RX ADMIN — Medication 50 MILLILITER(S): at 22:36

## 2024-06-15 RX ADMIN — ZALEPLON 5 MILLIGRAM(S): 10 CAPSULE ORAL at 23:54

## 2024-06-15 RX ADMIN — Medication 500 MILLILITER(S): at 23:19

## 2024-06-15 RX ADMIN — SODIUM ZIRCONIUM CYCLOSILICATE 5 GRAM(S): 10 POWDER, FOR SUSPENSION ORAL at 22:38

## 2024-06-15 RX ADMIN — CALCIUM GLUCONATE 100 GRAM(S): 98 INJECTION, SOLUTION INTRAVENOUS at 22:09

## 2024-06-16 DIAGNOSIS — R63.4 ABNORMAL WEIGHT LOSS: ICD-10-CM

## 2024-06-16 DIAGNOSIS — Z98.890 OTHER SPECIFIED POSTPROCEDURAL STATES: ICD-10-CM

## 2024-06-16 DIAGNOSIS — Z29.9 ENCOUNTER FOR PROPHYLACTIC MEASURES, UNSPECIFIED: ICD-10-CM

## 2024-06-16 DIAGNOSIS — N17.9 ACUTE KIDNEY FAILURE, UNSPECIFIED: ICD-10-CM

## 2024-06-16 DIAGNOSIS — E87.5 HYPERKALEMIA: ICD-10-CM

## 2024-06-16 DIAGNOSIS — I10 ESSENTIAL (PRIMARY) HYPERTENSION: ICD-10-CM

## 2024-06-16 LAB
ALBUMIN SERPL ELPH-MCNC: 3.8 G/DL — SIGNIFICANT CHANGE UP (ref 3.3–5)
ALP SERPL-CCNC: 105 U/L — SIGNIFICANT CHANGE UP (ref 40–120)
ALT FLD-CCNC: 6 U/L — LOW (ref 10–45)
ANION GAP SERPL CALC-SCNC: 14 MMOL/L — SIGNIFICANT CHANGE UP (ref 5–17)
ANION GAP SERPL CALC-SCNC: 14 MMOL/L — SIGNIFICANT CHANGE UP (ref 5–17)
AST SERPL-CCNC: 11 U/L — SIGNIFICANT CHANGE UP (ref 10–40)
BILIRUB SERPL-MCNC: 0.2 MG/DL — SIGNIFICANT CHANGE UP (ref 0.2–1.2)
BUN SERPL-MCNC: 66 MG/DL — HIGH (ref 7–23)
BUN SERPL-MCNC: 74 MG/DL — HIGH (ref 7–23)
CALCIUM SERPL-MCNC: 10.2 MG/DL — SIGNIFICANT CHANGE UP (ref 8.4–10.5)
CALCIUM SERPL-MCNC: 10.3 MG/DL — SIGNIFICANT CHANGE UP (ref 8.4–10.5)
CHLORIDE SERPL-SCNC: 101 MMOL/L — SIGNIFICANT CHANGE UP (ref 96–108)
CHLORIDE SERPL-SCNC: 104 MMOL/L — SIGNIFICANT CHANGE UP (ref 96–108)
CO2 SERPL-SCNC: 17 MMOL/L — LOW (ref 22–31)
CO2 SERPL-SCNC: 18 MMOL/L — LOW (ref 22–31)
CREAT SERPL-MCNC: 1.93 MG/DL — HIGH (ref 0.5–1.3)
CREAT SERPL-MCNC: 2.14 MG/DL — HIGH (ref 0.5–1.3)
EGFR: 21 ML/MIN/1.73M2 — LOW
EGFR: 23 ML/MIN/1.73M2 — LOW
GLUCOSE BLDC GLUCOMTR-MCNC: 153 MG/DL — HIGH (ref 70–99)
GLUCOSE SERPL-MCNC: 115 MG/DL — HIGH (ref 70–99)
GLUCOSE SERPL-MCNC: 158 MG/DL — HIGH (ref 70–99)
HCT VFR BLD CALC: 30.3 % — LOW (ref 34.5–45)
HCT VFR BLD CALC: 32.1 % — LOW (ref 34.5–45)
HGB BLD-MCNC: 10.2 G/DL — LOW (ref 11.5–15.5)
HGB BLD-MCNC: 9.6 G/DL — LOW (ref 11.5–15.5)
MAGNESIUM SERPL-MCNC: 1.7 MG/DL — SIGNIFICANT CHANGE UP (ref 1.6–2.6)
MCHC RBC-ENTMCNC: 28.3 PG — SIGNIFICANT CHANGE UP (ref 27–34)
MCHC RBC-ENTMCNC: 28.5 PG — SIGNIFICANT CHANGE UP (ref 27–34)
MCHC RBC-ENTMCNC: 31.7 GM/DL — LOW (ref 32–36)
MCHC RBC-ENTMCNC: 31.8 GM/DL — LOW (ref 32–36)
MCV RBC AUTO: 89.2 FL — SIGNIFICANT CHANGE UP (ref 80–100)
MCV RBC AUTO: 89.9 FL — SIGNIFICANT CHANGE UP (ref 80–100)
NRBC # BLD: 0 /100 WBCS — SIGNIFICANT CHANGE UP (ref 0–0)
NRBC # BLD: 0 /100 WBCS — SIGNIFICANT CHANGE UP (ref 0–0)
PHOSPHATE SERPL-MCNC: 4.1 MG/DL — SIGNIFICANT CHANGE UP (ref 2.5–4.5)
PLATELET # BLD AUTO: 678 K/UL — HIGH (ref 150–400)
PLATELET # BLD AUTO: 716 K/UL — HIGH (ref 150–400)
POTASSIUM SERPL-MCNC: 4.8 MMOL/L — SIGNIFICANT CHANGE UP (ref 3.5–5.3)
POTASSIUM SERPL-MCNC: 5 MMOL/L — SIGNIFICANT CHANGE UP (ref 3.5–5.3)
POTASSIUM SERPL-SCNC: 4.8 MMOL/L — SIGNIFICANT CHANGE UP (ref 3.5–5.3)
POTASSIUM SERPL-SCNC: 5 MMOL/L — SIGNIFICANT CHANGE UP (ref 3.5–5.3)
PROT SERPL-MCNC: 6.7 G/DL — SIGNIFICANT CHANGE UP (ref 6–8.3)
RBC # BLD: 3.37 M/UL — LOW (ref 3.8–5.2)
RBC # BLD: 3.6 M/UL — LOW (ref 3.8–5.2)
RBC # FLD: 17.9 % — HIGH (ref 10.3–14.5)
RBC # FLD: 18 % — HIGH (ref 10.3–14.5)
SODIUM SERPL-SCNC: 133 MMOL/L — LOW (ref 135–145)
SODIUM SERPL-SCNC: 135 MMOL/L — SIGNIFICANT CHANGE UP (ref 135–145)
WBC # BLD: 11.48 K/UL — HIGH (ref 3.8–10.5)
WBC # BLD: 15.9 K/UL — HIGH (ref 3.8–10.5)
WBC # FLD AUTO: 11.48 K/UL — HIGH (ref 3.8–10.5)
WBC # FLD AUTO: 15.9 K/UL — HIGH (ref 3.8–10.5)

## 2024-06-16 PROCEDURE — 99223 1ST HOSP IP/OBS HIGH 75: CPT | Mod: GC

## 2024-06-16 RX ORDER — POLYETHYLENE GLYCOL 3350 1 G/G
17 POWDER ORAL DAILY
Refills: 0 | Status: DISCONTINUED | OUTPATIENT
Start: 2024-06-16 | End: 2024-06-21

## 2024-06-16 RX ORDER — SODIUM CHLORIDE 0.9 % (FLUSH) 0.9 %
1000 SYRINGE (ML) INJECTION
Refills: 0 | Status: DISCONTINUED | OUTPATIENT
Start: 2024-06-16 | End: 2024-06-21

## 2024-06-16 RX ORDER — CETIRIZINE HCL 10 MG
1 TABLET,CHEWABLE ORAL
Refills: 0 | DISCHARGE

## 2024-06-16 RX ORDER — ATENOLOL 50 MG/1
50 TABLET ORAL DAILY
Refills: 0 | Status: DISCONTINUED | OUTPATIENT
Start: 2024-06-16 | End: 2024-06-21

## 2024-06-16 RX ORDER — SENNOSIDES 8.6 MG
2 TABLET ORAL AT BEDTIME
Refills: 0 | Status: DISCONTINUED | OUTPATIENT
Start: 2024-06-16 | End: 2024-06-21

## 2024-06-16 RX ORDER — LORATADINE 10 MG/1
10 TABLET ORAL DAILY
Refills: 0 | Status: DISCONTINUED | OUTPATIENT
Start: 2024-06-16 | End: 2024-06-21

## 2024-06-16 RX ORDER — HEPARIN SODIUM 50 [USP'U]/ML
5000 INJECTION, SOLUTION INTRAVENOUS EVERY 12 HOURS
Refills: 0 | Status: DISCONTINUED | OUTPATIENT
Start: 2024-06-16 | End: 2024-06-21

## 2024-06-16 RX ORDER — CHOLECALCIFEROL (VITAMIN D3) 125 MCG
1000 CAPSULE ORAL
Qty: 0 | Refills: 0 | DISCHARGE

## 2024-06-16 RX ORDER — OXYBUTYNIN CHLORIDE 5 MG
1 TABLET ORAL
Qty: 0 | Refills: 0 | DISCHARGE

## 2024-06-16 RX ORDER — ZOLPIDEM TARTRATE 10 MG
5 TABLET ORAL ONCE
Refills: 0 | Status: DISCONTINUED | OUTPATIENT
Start: 2024-06-16 | End: 2024-06-16

## 2024-06-16 RX ORDER — ESZOPICLONE 2 MG/1
1 TABLET, COATED ORAL
Refills: 0 | DISCHARGE

## 2024-06-16 RX ORDER — SODIUM CHLORIDE 0.9 % (FLUSH) 0.9 %
1000 SYRINGE (ML) INJECTION
Refills: 0 | Status: DISCONTINUED | OUTPATIENT
Start: 2024-06-16 | End: 2024-06-16

## 2024-06-16 RX ORDER — E-LYTES/CARBOXYMETHYLCELLULOSE
15 SOLUTION, NON-ORAL MUCOUS MEMBRANE THREE TIMES A DAY
Refills: 0 | Status: DISCONTINUED | OUTPATIENT
Start: 2024-06-16 | End: 2024-06-21

## 2024-06-16 RX ADMIN — Medication 1000 UNIT(S): at 11:33

## 2024-06-16 RX ADMIN — HEPARIN SODIUM 5000 UNIT(S): 50 INJECTION, SOLUTION INTRAVENOUS at 18:23

## 2024-06-16 RX ADMIN — POLYETHYLENE GLYCOL 3350 17 GRAM(S): 1 POWDER ORAL at 11:33

## 2024-06-16 RX ADMIN — LORATADINE 10 MILLIGRAM(S): 10 TABLET ORAL at 11:33

## 2024-06-16 RX ADMIN — Medication 2 TABLET(S): at 21:48

## 2024-06-16 RX ADMIN — Medication 5 MILLIGRAM(S): at 22:32

## 2024-06-16 RX ADMIN — Medication 50 MILLILITER(S): at 16:30

## 2024-06-16 RX ADMIN — ATENOLOL 50 MILLIGRAM(S): 50 TABLET ORAL at 05:38

## 2024-06-16 RX ADMIN — HEPARIN SODIUM 5000 UNIT(S): 50 INJECTION, SOLUTION INTRAVENOUS at 05:38

## 2024-06-17 ENCOUNTER — TRANSCRIPTION ENCOUNTER (OUTPATIENT)
Age: 89
End: 2024-06-17

## 2024-06-17 LAB
ANION GAP SERPL CALC-SCNC: 17 MMOL/L — SIGNIFICANT CHANGE UP (ref 5–17)
BUN SERPL-MCNC: 54 MG/DL — HIGH (ref 7–23)
CALCIUM SERPL-MCNC: 10 MG/DL — SIGNIFICANT CHANGE UP (ref 8.4–10.5)
CHLORIDE SERPL-SCNC: 106 MMOL/L — SIGNIFICANT CHANGE UP (ref 96–108)
CO2 SERPL-SCNC: 16 MMOL/L — LOW (ref 22–31)
CREAT ?TM UR-MCNC: 47 MG/DL — SIGNIFICANT CHANGE UP
CREAT SERPL-MCNC: 1.85 MG/DL — HIGH (ref 0.5–1.3)
EGFR: 24 ML/MIN/1.73M2 — LOW
GLUCOSE SERPL-MCNC: 118 MG/DL — HIGH (ref 70–99)
HCT VFR BLD CALC: 32.2 % — LOW (ref 34.5–45)
HGB BLD-MCNC: 10.2 G/DL — LOW (ref 11.5–15.5)
MCHC RBC-ENTMCNC: 28.2 PG — SIGNIFICANT CHANGE UP (ref 27–34)
MCHC RBC-ENTMCNC: 31.7 GM/DL — LOW (ref 32–36)
MCV RBC AUTO: 89 FL — SIGNIFICANT CHANGE UP (ref 80–100)
NRBC # BLD: 0 /100 WBCS — SIGNIFICANT CHANGE UP (ref 0–0)
PLATELET # BLD AUTO: 756 K/UL — HIGH (ref 150–400)
POTASSIUM SERPL-MCNC: 4.9 MMOL/L — SIGNIFICANT CHANGE UP (ref 3.5–5.3)
POTASSIUM SERPL-SCNC: 4.9 MMOL/L — SIGNIFICANT CHANGE UP (ref 3.5–5.3)
RBC # BLD: 3.62 M/UL — LOW (ref 3.8–5.2)
RBC # FLD: 18 % — HIGH (ref 10.3–14.5)
SODIUM SERPL-SCNC: 139 MMOL/L — SIGNIFICANT CHANGE UP (ref 135–145)
SODIUM UR-SCNC: 65 MMOL/L — SIGNIFICANT CHANGE UP
UUN UR-MCNC: 456 MG/DL — SIGNIFICANT CHANGE UP
WBC # BLD: 12.33 K/UL — HIGH (ref 3.8–10.5)
WBC # FLD AUTO: 12.33 K/UL — HIGH (ref 3.8–10.5)

## 2024-06-17 PROCEDURE — 99233 SBSQ HOSP IP/OBS HIGH 50: CPT

## 2024-06-17 RX ORDER — ACETAMINOPHEN 325 MG
650 TABLET ORAL ONCE
Refills: 0 | Status: COMPLETED | OUTPATIENT
Start: 2024-06-17 | End: 2024-06-17

## 2024-06-17 RX ADMIN — Medication 650 MILLIGRAM(S): at 22:31

## 2024-06-17 RX ADMIN — HEPARIN SODIUM 5000 UNIT(S): 50 INJECTION, SOLUTION INTRAVENOUS at 17:25

## 2024-06-17 RX ADMIN — Medication 1 MILLIGRAM(S): at 23:30

## 2024-06-17 RX ADMIN — Medication 1 APPLICATION(S): at 17:25

## 2024-06-17 RX ADMIN — Medication 650 MILLIGRAM(S): at 08:52

## 2024-06-17 RX ADMIN — Medication 650 MILLIGRAM(S): at 21:13

## 2024-06-17 RX ADMIN — LORATADINE 10 MILLIGRAM(S): 10 TABLET ORAL at 11:35

## 2024-06-17 RX ADMIN — Medication 1000 UNIT(S): at 11:35

## 2024-06-17 RX ADMIN — HEPARIN SODIUM 5000 UNIT(S): 50 INJECTION, SOLUTION INTRAVENOUS at 06:12

## 2024-06-17 RX ADMIN — Medication 2 TABLET(S): at 21:13

## 2024-06-17 RX ADMIN — Medication 3 MILLIGRAM(S): at 21:13

## 2024-06-17 RX ADMIN — Medication 650 MILLIGRAM(S): at 09:06

## 2024-06-17 RX ADMIN — ATENOLOL 50 MILLIGRAM(S): 50 TABLET ORAL at 06:12

## 2024-06-18 LAB
ANION GAP SERPL CALC-SCNC: 14 MMOL/L — SIGNIFICANT CHANGE UP (ref 5–17)
BUN SERPL-MCNC: 48 MG/DL — HIGH (ref 7–23)
CALCIUM SERPL-MCNC: 9.7 MG/DL — SIGNIFICANT CHANGE UP (ref 8.4–10.5)
CHLORIDE SERPL-SCNC: 105 MMOL/L — SIGNIFICANT CHANGE UP (ref 96–108)
CO2 SERPL-SCNC: 18 MMOL/L — LOW (ref 22–31)
CREAT SERPL-MCNC: 1.72 MG/DL — HIGH (ref 0.5–1.3)
EGFR: 27 ML/MIN/1.73M2 — LOW
GLUCOSE SERPL-MCNC: 100 MG/DL — HIGH (ref 70–99)
HCT VFR BLD CALC: 32.2 % — LOW (ref 34.5–45)
HGB BLD-MCNC: 9.8 G/DL — LOW (ref 11.5–15.5)
MCHC RBC-ENTMCNC: 27.7 PG — SIGNIFICANT CHANGE UP (ref 27–34)
MCHC RBC-ENTMCNC: 30.4 GM/DL — LOW (ref 32–36)
MCV RBC AUTO: 91 FL — SIGNIFICANT CHANGE UP (ref 80–100)
NRBC # BLD: 0 /100 WBCS — SIGNIFICANT CHANGE UP (ref 0–0)
PLATELET # BLD AUTO: 660 K/UL — HIGH (ref 150–400)
POTASSIUM SERPL-MCNC: 4.8 MMOL/L — SIGNIFICANT CHANGE UP (ref 3.5–5.3)
POTASSIUM SERPL-SCNC: 4.8 MMOL/L — SIGNIFICANT CHANGE UP (ref 3.5–5.3)
RBC # BLD: 3.54 M/UL — LOW (ref 3.8–5.2)
RBC # FLD: 17.8 % — HIGH (ref 10.3–14.5)
SODIUM SERPL-SCNC: 137 MMOL/L — SIGNIFICANT CHANGE UP (ref 135–145)
WBC # BLD: 8.47 K/UL — SIGNIFICANT CHANGE UP (ref 3.8–10.5)
WBC # FLD AUTO: 8.47 K/UL — SIGNIFICANT CHANGE UP (ref 3.8–10.5)

## 2024-06-18 PROCEDURE — 99233 SBSQ HOSP IP/OBS HIGH 50: CPT

## 2024-06-18 RX ORDER — AMLODIPINE BESYLATE 2.5 MG/1
2.5 TABLET ORAL DAILY
Refills: 0 | Status: DISCONTINUED | OUTPATIENT
Start: 2024-06-18 | End: 2024-06-18

## 2024-06-18 RX ORDER — AMLODIPINE BESYLATE 2.5 MG/1
2.5 TABLET ORAL ONCE
Refills: 0 | Status: DISCONTINUED | OUTPATIENT
Start: 2024-06-18 | End: 2024-06-18

## 2024-06-18 RX ORDER — AMLODIPINE BESYLATE 2.5 MG/1
2.5 TABLET ORAL DAILY
Refills: 0 | Status: DISCONTINUED | OUTPATIENT
Start: 2024-06-19 | End: 2024-06-21

## 2024-06-18 RX ADMIN — HEPARIN SODIUM 5000 UNIT(S): 50 INJECTION, SOLUTION INTRAVENOUS at 05:24

## 2024-06-18 RX ADMIN — Medication 2 TABLET(S): at 23:00

## 2024-06-18 RX ADMIN — HEPARIN SODIUM 5000 UNIT(S): 50 INJECTION, SOLUTION INTRAVENOUS at 18:31

## 2024-06-18 RX ADMIN — Medication 15 MILLILITER(S): at 23:00

## 2024-06-18 RX ADMIN — AMLODIPINE BESYLATE 2.5 MILLIGRAM(S): 2.5 TABLET ORAL at 11:22

## 2024-06-18 RX ADMIN — Medication 1 APPLICATION(S): at 10:49

## 2024-06-18 RX ADMIN — ATENOLOL 50 MILLIGRAM(S): 50 TABLET ORAL at 05:24

## 2024-06-18 RX ADMIN — LORATADINE 10 MILLIGRAM(S): 10 TABLET ORAL at 11:20

## 2024-06-18 RX ADMIN — Medication 1000 UNIT(S): at 11:21

## 2024-06-18 RX ADMIN — Medication 3 MILLIGRAM(S): at 23:01

## 2024-06-19 DIAGNOSIS — E87.1 HYPO-OSMOLALITY AND HYPONATREMIA: ICD-10-CM

## 2024-06-19 DIAGNOSIS — S91.301A UNSPECIFIED OPEN WOUND, RIGHT FOOT, INITIAL ENCOUNTER: ICD-10-CM

## 2024-06-19 LAB
ANION GAP SERPL CALC-SCNC: 15 MMOL/L — SIGNIFICANT CHANGE UP (ref 5–17)
BASOPHILS # BLD AUTO: 0.06 K/UL — SIGNIFICANT CHANGE UP (ref 0–0.2)
BASOPHILS NFR BLD AUTO: 0.5 % — SIGNIFICANT CHANGE UP (ref 0–2)
BUN SERPL-MCNC: 64 MG/DL — HIGH (ref 7–23)
CALCIUM SERPL-MCNC: 9.6 MG/DL — SIGNIFICANT CHANGE UP (ref 8.4–10.5)
CHLORIDE SERPL-SCNC: 101 MMOL/L — SIGNIFICANT CHANGE UP (ref 96–108)
CO2 SERPL-SCNC: 17 MMOL/L — LOW (ref 22–31)
CREAT SERPL-MCNC: 1.9 MG/DL — HIGH (ref 0.5–1.3)
EGFR: 24 ML/MIN/1.73M2 — LOW
EOSINOPHIL # BLD AUTO: 0.21 K/UL — SIGNIFICANT CHANGE UP (ref 0–0.5)
EOSINOPHIL NFR BLD AUTO: 1.9 % — SIGNIFICANT CHANGE UP (ref 0–6)
GLUCOSE SERPL-MCNC: 110 MG/DL — HIGH (ref 70–99)
HCT VFR BLD CALC: 29.3 % — LOW (ref 34.5–45)
HGB BLD-MCNC: 9.5 G/DL — LOW (ref 11.5–15.5)
IMM GRANULOCYTES NFR BLD AUTO: 0.8 % — SIGNIFICANT CHANGE UP (ref 0–0.9)
LYMPHOCYTES # BLD AUTO: 2.38 K/UL — SIGNIFICANT CHANGE UP (ref 1–3.3)
LYMPHOCYTES # BLD AUTO: 21.7 % — SIGNIFICANT CHANGE UP (ref 13–44)
MCHC RBC-ENTMCNC: 28.7 PG — SIGNIFICANT CHANGE UP (ref 27–34)
MCHC RBC-ENTMCNC: 32.4 GM/DL — SIGNIFICANT CHANGE UP (ref 32–36)
MCV RBC AUTO: 88.5 FL — SIGNIFICANT CHANGE UP (ref 80–100)
MONOCYTES # BLD AUTO: 0.99 K/UL — HIGH (ref 0–0.9)
MONOCYTES NFR BLD AUTO: 9 % — SIGNIFICANT CHANGE UP (ref 2–14)
NEUTROPHILS # BLD AUTO: 7.24 K/UL — SIGNIFICANT CHANGE UP (ref 1.8–7.4)
NEUTROPHILS NFR BLD AUTO: 66.1 % — SIGNIFICANT CHANGE UP (ref 43–77)
NRBC # BLD: 0 /100 WBCS — SIGNIFICANT CHANGE UP (ref 0–0)
PLATELET # BLD AUTO: 559 K/UL — HIGH (ref 150–400)
POTASSIUM SERPL-MCNC: 4.9 MMOL/L — SIGNIFICANT CHANGE UP (ref 3.5–5.3)
POTASSIUM SERPL-SCNC: 4.9 MMOL/L — SIGNIFICANT CHANGE UP (ref 3.5–5.3)
RBC # BLD: 3.31 M/UL — LOW (ref 3.8–5.2)
RBC # FLD: 17.8 % — HIGH (ref 10.3–14.5)
SODIUM SERPL-SCNC: 133 MMOL/L — LOW (ref 135–145)
WBC # BLD: 10.97 K/UL — HIGH (ref 3.8–10.5)
WBC # FLD AUTO: 10.97 K/UL — HIGH (ref 3.8–10.5)

## 2024-06-19 PROCEDURE — 99232 SBSQ HOSP IP/OBS MODERATE 35: CPT

## 2024-06-19 RX ORDER — SODIUM CHLORIDE 0.9 % (FLUSH) 0.9 %
1000 SYRINGE (ML) INJECTION
Refills: 0 | Status: DISCONTINUED | OUTPATIENT
Start: 2024-06-19 | End: 2024-06-21

## 2024-06-19 RX ORDER — ACETAMINOPHEN 325 MG
650 TABLET ORAL EVERY 6 HOURS
Refills: 0 | Status: DISCONTINUED | OUTPATIENT
Start: 2024-06-19 | End: 2024-06-21

## 2024-06-19 RX ADMIN — Medication 50 MILLILITER(S): at 10:44

## 2024-06-19 RX ADMIN — POLYETHYLENE GLYCOL 3350 17 GRAM(S): 1 POWDER ORAL at 13:24

## 2024-06-19 RX ADMIN — ATENOLOL 50 MILLIGRAM(S): 50 TABLET ORAL at 05:10

## 2024-06-19 RX ADMIN — Medication 1 APPLICATION(S): at 13:20

## 2024-06-19 RX ADMIN — Medication 3 MILLIGRAM(S): at 23:41

## 2024-06-19 RX ADMIN — Medication 15 MILLILITER(S): at 13:31

## 2024-06-19 RX ADMIN — LORATADINE 10 MILLIGRAM(S): 10 TABLET ORAL at 13:22

## 2024-06-19 RX ADMIN — HEPARIN SODIUM 5000 UNIT(S): 50 INJECTION, SOLUTION INTRAVENOUS at 18:52

## 2024-06-19 RX ADMIN — Medication 1000 UNIT(S): at 13:21

## 2024-06-19 RX ADMIN — Medication 1 TABLET(S): at 13:22

## 2024-06-19 RX ADMIN — Medication 15 MILLILITER(S): at 05:10

## 2024-06-19 RX ADMIN — Medication 2 TABLET(S): at 22:37

## 2024-06-19 RX ADMIN — Medication 15 MILLILITER(S): at 22:37

## 2024-06-19 RX ADMIN — AMLODIPINE BESYLATE 2.5 MILLIGRAM(S): 2.5 TABLET ORAL at 05:09

## 2024-06-19 RX ADMIN — HEPARIN SODIUM 5000 UNIT(S): 50 INJECTION, SOLUTION INTRAVENOUS at 05:10

## 2024-06-20 LAB
ANION GAP SERPL CALC-SCNC: 15 MMOL/L — SIGNIFICANT CHANGE UP (ref 5–17)
BUN SERPL-MCNC: 52 MG/DL — HIGH (ref 7–23)
CALCIUM SERPL-MCNC: 9.7 MG/DL — SIGNIFICANT CHANGE UP (ref 8.4–10.5)
CHLORIDE SERPL-SCNC: 107 MMOL/L — SIGNIFICANT CHANGE UP (ref 96–108)
CO2 SERPL-SCNC: 18 MMOL/L — LOW (ref 22–31)
CREAT SERPL-MCNC: 1.61 MG/DL — HIGH (ref 0.5–1.3)
EGFR: 29 ML/MIN/1.73M2 — LOW
GLUCOSE SERPL-MCNC: 107 MG/DL — HIGH (ref 70–99)
HCT VFR BLD CALC: 29.9 % — LOW (ref 34.5–45)
HGB BLD-MCNC: 9.5 G/DL — LOW (ref 11.5–15.5)
MCHC RBC-ENTMCNC: 28.6 PG — SIGNIFICANT CHANGE UP (ref 27–34)
MCHC RBC-ENTMCNC: 31.8 GM/DL — LOW (ref 32–36)
MCV RBC AUTO: 90.1 FL — SIGNIFICANT CHANGE UP (ref 80–100)
NRBC # BLD: 0 /100 WBCS — SIGNIFICANT CHANGE UP (ref 0–0)
PLATELET # BLD AUTO: 552 K/UL — HIGH (ref 150–400)
POTASSIUM SERPL-MCNC: 4.8 MMOL/L — SIGNIFICANT CHANGE UP (ref 3.5–5.3)
POTASSIUM SERPL-SCNC: 4.8 MMOL/L — SIGNIFICANT CHANGE UP (ref 3.5–5.3)
RBC # BLD: 3.32 M/UL — LOW (ref 3.8–5.2)
RBC # FLD: 17.9 % — HIGH (ref 10.3–14.5)
SODIUM SERPL-SCNC: 140 MMOL/L — SIGNIFICANT CHANGE UP (ref 135–145)
WBC # BLD: 9.61 K/UL — SIGNIFICANT CHANGE UP (ref 3.8–10.5)
WBC # FLD AUTO: 9.61 K/UL — SIGNIFICANT CHANGE UP (ref 3.8–10.5)

## 2024-06-20 PROCEDURE — 99232 SBSQ HOSP IP/OBS MODERATE 35: CPT

## 2024-06-20 RX ORDER — POLYETHYLENE GLYCOL 3350 1 G/G
17 POWDER ORAL
Qty: 0 | Refills: 0 | DISCHARGE
Start: 2024-06-20

## 2024-06-20 RX ORDER — TRAMADOL HYDROCHLORIDE 50 MG/1
25 TABLET, FILM COATED ORAL EVERY 6 HOURS
Refills: 0 | Status: DISCONTINUED | OUTPATIENT
Start: 2024-06-20 | End: 2024-06-21

## 2024-06-20 RX ORDER — TRAMADOL HYDROCHLORIDE 50 MG/1
25 TABLET, FILM COATED ORAL ONCE
Refills: 0 | Status: DISCONTINUED | OUTPATIENT
Start: 2024-06-20 | End: 2024-06-20

## 2024-06-20 RX ORDER — E-LYTES/CARBOXYMETHYLCELLULOSE
15 SOLUTION, NON-ORAL MUCOUS MEMBRANE
Qty: 0 | Refills: 0 | DISCHARGE
Start: 2024-06-20

## 2024-06-20 RX ADMIN — Medication 1 TABLET(S): at 11:30

## 2024-06-20 RX ADMIN — Medication 15 MILLILITER(S): at 11:29

## 2024-06-20 RX ADMIN — Medication 2 TABLET(S): at 21:52

## 2024-06-20 RX ADMIN — TRAMADOL HYDROCHLORIDE 25 MILLIGRAM(S): 50 TABLET, FILM COATED ORAL at 18:09

## 2024-06-20 RX ADMIN — HEPARIN SODIUM 5000 UNIT(S): 50 INJECTION, SOLUTION INTRAVENOUS at 17:25

## 2024-06-20 RX ADMIN — ATENOLOL 50 MILLIGRAM(S): 50 TABLET ORAL at 06:23

## 2024-06-20 RX ADMIN — AMLODIPINE BESYLATE 2.5 MILLIGRAM(S): 2.5 TABLET ORAL at 06:24

## 2024-06-20 RX ADMIN — TRAMADOL HYDROCHLORIDE 25 MILLIGRAM(S): 50 TABLET, FILM COATED ORAL at 16:11

## 2024-06-20 RX ADMIN — Medication 3 MILLIGRAM(S): at 22:10

## 2024-06-20 RX ADMIN — Medication 15 MILLILITER(S): at 21:52

## 2024-06-20 RX ADMIN — Medication 1000 UNIT(S): at 11:29

## 2024-06-20 RX ADMIN — LORATADINE 10 MILLIGRAM(S): 10 TABLET ORAL at 11:29

## 2024-06-20 RX ADMIN — HEPARIN SODIUM 5000 UNIT(S): 50 INJECTION, SOLUTION INTRAVENOUS at 06:23

## 2024-06-20 RX ADMIN — Medication 15 MILLILITER(S): at 06:23

## 2024-06-20 RX ADMIN — Medication 1 APPLICATION(S): at 11:33

## 2024-06-21 ENCOUNTER — TRANSCRIPTION ENCOUNTER (OUTPATIENT)
Age: 89
End: 2024-06-21

## 2024-06-21 VITALS
DIASTOLIC BLOOD PRESSURE: 68 MMHG | SYSTOLIC BLOOD PRESSURE: 148 MMHG | RESPIRATION RATE: 18 BRPM | TEMPERATURE: 98 F | HEART RATE: 70 BPM | OXYGEN SATURATION: 95 %

## 2024-06-21 LAB
ANION GAP SERPL CALC-SCNC: 16 MMOL/L — SIGNIFICANT CHANGE UP (ref 5–17)
BUN SERPL-MCNC: 51 MG/DL — HIGH (ref 7–23)
CALCIUM SERPL-MCNC: 9.2 MG/DL — SIGNIFICANT CHANGE UP (ref 8.4–10.5)
CHLORIDE SERPL-SCNC: 106 MMOL/L — SIGNIFICANT CHANGE UP (ref 96–108)
CO2 SERPL-SCNC: 18 MMOL/L — LOW (ref 22–31)
CREAT SERPL-MCNC: 1.64 MG/DL — HIGH (ref 0.5–1.3)
EGFR: 28 ML/MIN/1.73M2 — LOW
GLUCOSE SERPL-MCNC: 99 MG/DL — SIGNIFICANT CHANGE UP (ref 70–99)
POTASSIUM SERPL-MCNC: 4.5 MMOL/L — SIGNIFICANT CHANGE UP (ref 3.5–5.3)
POTASSIUM SERPL-SCNC: 4.5 MMOL/L — SIGNIFICANT CHANGE UP (ref 3.5–5.3)
SODIUM SERPL-SCNC: 140 MMOL/L — SIGNIFICANT CHANGE UP (ref 135–145)

## 2024-06-21 PROCEDURE — 82962 GLUCOSE BLOOD TEST: CPT

## 2024-06-21 PROCEDURE — 80048 BASIC METABOLIC PNL TOTAL CA: CPT

## 2024-06-21 PROCEDURE — 97116 GAIT TRAINING THERAPY: CPT

## 2024-06-21 PROCEDURE — 97161 PT EVAL LOW COMPLEX 20 MIN: CPT

## 2024-06-21 PROCEDURE — 84540 ASSAY OF URINE/UREA-N: CPT

## 2024-06-21 PROCEDURE — 96374 THER/PROPH/DIAG INJ IV PUSH: CPT

## 2024-06-21 PROCEDURE — 83735 ASSAY OF MAGNESIUM: CPT

## 2024-06-21 PROCEDURE — 85025 COMPLETE CBC W/AUTO DIFF WBC: CPT

## 2024-06-21 PROCEDURE — 84300 ASSAY OF URINE SODIUM: CPT

## 2024-06-21 PROCEDURE — 99239 HOSP IP/OBS DSCHRG MGMT >30: CPT

## 2024-06-21 PROCEDURE — 85027 COMPLETE CBC AUTOMATED: CPT

## 2024-06-21 PROCEDURE — 97530 THERAPEUTIC ACTIVITIES: CPT

## 2024-06-21 PROCEDURE — 99285 EMERGENCY DEPT VISIT HI MDM: CPT | Mod: 25

## 2024-06-21 PROCEDURE — 84100 ASSAY OF PHOSPHORUS: CPT

## 2024-06-21 PROCEDURE — 82570 ASSAY OF URINE CREATININE: CPT

## 2024-06-21 PROCEDURE — 96375 TX/PRO/DX INJ NEW DRUG ADDON: CPT

## 2024-06-21 PROCEDURE — 80053 COMPREHEN METABOLIC PANEL: CPT

## 2024-06-21 PROCEDURE — 36415 COLL VENOUS BLD VENIPUNCTURE: CPT

## 2024-06-21 RX ORDER — TRAMADOL HYDROCHLORIDE 50 MG/1
0.5 TABLET, FILM COATED ORAL
Qty: 0 | Refills: 0 | DISCHARGE
Start: 2024-06-21

## 2024-06-21 RX ORDER — ATENOLOL 50 MG/1
25 TABLET ORAL ONCE
Refills: 0 | Status: COMPLETED | OUTPATIENT
Start: 2024-06-21 | End: 2024-06-21

## 2024-06-21 RX ORDER — AMLODIPINE BESYLATE 2.5 MG/1
5 TABLET ORAL DAILY
Refills: 0 | Status: DISCONTINUED | OUTPATIENT
Start: 2024-06-22 | End: 2024-06-21

## 2024-06-21 RX ORDER — AMLODIPINE BESYLATE 2.5 MG/1
2.5 TABLET ORAL ONCE
Refills: 0 | Status: COMPLETED | OUTPATIENT
Start: 2024-06-21 | End: 2024-06-21

## 2024-06-21 RX ORDER — AMLODIPINE BESYLATE 2.5 MG/1
1 TABLET ORAL
Qty: 0 | Refills: 0 | DISCHARGE
Start: 2024-06-21

## 2024-06-21 RX ORDER — IRBESARTAN 75 MG/1
1 TABLET ORAL
Refills: 0 | DISCHARGE

## 2024-06-21 RX ADMIN — TRAMADOL HYDROCHLORIDE 25 MILLIGRAM(S): 50 TABLET, FILM COATED ORAL at 09:24

## 2024-06-21 RX ADMIN — ATENOLOL 50 MILLIGRAM(S): 50 TABLET ORAL at 05:17

## 2024-06-21 RX ADMIN — Medication 1 APPLICATION(S): at 12:17

## 2024-06-21 RX ADMIN — POLYETHYLENE GLYCOL 3350 17 GRAM(S): 1 POWDER ORAL at 12:16

## 2024-06-21 RX ADMIN — Medication 1000 UNIT(S): at 12:17

## 2024-06-21 RX ADMIN — TRAMADOL HYDROCHLORIDE 25 MILLIGRAM(S): 50 TABLET, FILM COATED ORAL at 08:42

## 2024-06-21 RX ADMIN — Medication 1 TABLET(S): at 12:17

## 2024-06-21 RX ADMIN — ATENOLOL 25 MILLIGRAM(S): 50 TABLET ORAL at 01:06

## 2024-06-21 RX ADMIN — LORATADINE 10 MILLIGRAM(S): 10 TABLET ORAL at 12:17

## 2024-06-21 RX ADMIN — AMLODIPINE BESYLATE 2.5 MILLIGRAM(S): 2.5 TABLET ORAL at 09:40

## 2024-06-21 RX ADMIN — AMLODIPINE BESYLATE 2.5 MILLIGRAM(S): 2.5 TABLET ORAL at 05:17

## 2024-06-21 RX ADMIN — Medication 15 MILLILITER(S): at 12:17

## 2024-06-21 RX ADMIN — HEPARIN SODIUM 5000 UNIT(S): 50 INJECTION, SOLUTION INTRAVENOUS at 05:17

## 2024-06-21 RX ADMIN — Medication 15 MILLILITER(S): at 05:18

## 2024-07-01 NOTE — DISCHARGE NOTE NURSING/CASE MANAGEMENT/SOCIAL WORK - NSTRANSFERBELONGINGSRESP_GEN_A_NUR
Maintained on Depakote  Valproic acid level is 40, ammonia level normal  Seizure precautions  Follow PT OT recommendation    With elevated transaminases will repeat valproic acid and ammonia level  Valproic acid total is low - free pending   Ammonia normal    yes

## 2024-07-09 ENCOUNTER — OUTPATIENT (OUTPATIENT)
Dept: OUTPATIENT SERVICES | Facility: HOSPITAL | Age: 89
LOS: 1 days | End: 2024-07-09
Payer: MEDICARE

## 2024-07-09 ENCOUNTER — APPOINTMENT (OUTPATIENT)
Dept: ULTRASOUND IMAGING | Facility: HOSPITAL | Age: 89
End: 2024-07-09

## 2024-07-09 DIAGNOSIS — Z90.89 ACQUIRED ABSENCE OF OTHER ORGANS: Chronic | ICD-10-CM

## 2024-07-09 DIAGNOSIS — Z98.890 OTHER SPECIFIED POSTPROCEDURAL STATES: Chronic | ICD-10-CM

## 2024-07-09 DIAGNOSIS — I70.211 ATHEROSCLEROSIS OF NATIVE ARTERIES OF EXTREMITIES WITH INTERMITTENT CLAUDICATION, RIGHT LEG: ICD-10-CM

## 2024-07-09 PROCEDURE — 93926 LOWER EXTREMITY STUDY: CPT

## 2024-07-09 PROCEDURE — 93926 LOWER EXTREMITY STUDY: CPT | Mod: 26,RT

## 2024-07-18 ENCOUNTER — APPOINTMENT (OUTPATIENT)
Dept: VASCULAR SURGERY | Facility: CLINIC | Age: 89
End: 2024-07-18
Payer: MEDICARE

## 2024-07-18 VITALS — DIASTOLIC BLOOD PRESSURE: 62 MMHG | HEART RATE: 74 BPM | SYSTOLIC BLOOD PRESSURE: 118 MMHG

## 2024-07-18 DIAGNOSIS — I73.9 PERIPHERAL VASCULAR DISEASE, UNSPECIFIED: ICD-10-CM

## 2024-07-18 PROCEDURE — 99204 OFFICE O/P NEW MOD 45 MIN: CPT

## 2024-08-07 ENCOUNTER — APPOINTMENT (OUTPATIENT)
Dept: ORTHOPEDIC SURGERY | Facility: CLINIC | Age: 89
End: 2024-08-07

## 2024-08-07 PROCEDURE — 73501 X-RAY EXAM HIP UNI 1 VIEW: CPT

## 2024-08-07 PROCEDURE — 72170 X-RAY EXAM OF PELVIS: CPT | Mod: RT

## 2024-08-07 PROCEDURE — 99024 POSTOP FOLLOW-UP VISIT: CPT

## 2024-08-20 NOTE — H&P ADULT - PROBLEM/PLAN-2
(1) A healthy and balanced diet.   (2) Omega-3 vitamin supplements may provide some benefit.   (3) Practice good sleep practices. Discontinue use of electronic devices after 5 pm to promote better rest.   (4) Engage in regular physical activity and establish an exercise regimen.  (5) Behavioral modification therapy and/or Cognitive Behavioral Therapy recommended for better long-term outcomes.  (6) Abstain from all drug and alcohol use. Limit intake of caffeine as this may worsen or contribute to anxiety. Eliminate caffeine after 12 pm.  (7) Take medications only as directed.  (8) Do not share prescribed medications with anyone.  (9) Lost or stolen medications will not be refilled.  (10) Do not stop or modify medication dosages without first discussing with prescriber.  (11) Notify clinic immediately for any problems, side effects, or other concerns with medications.  (12) Notify the clinic or report to Emergency Medical Services if any signs of allergic reaction or suicidal ideations occur.  (13) Keep follow up appointments as scheduled.   
DISPLAY PLAN FREE TEXT

## 2024-08-23 ENCOUNTER — APPOINTMENT (OUTPATIENT)
Dept: VASCULAR SURGERY | Facility: CLINIC | Age: 89
End: 2024-08-23

## 2024-12-20 ENCOUNTER — APPOINTMENT (OUTPATIENT)
Dept: OTOLARYNGOLOGY | Facility: CLINIC | Age: 88
End: 2024-12-20
Payer: MEDICARE

## 2024-12-20 ENCOUNTER — NON-APPOINTMENT (OUTPATIENT)
Age: 88
End: 2024-12-20

## 2024-12-20 ENCOUNTER — APPOINTMENT (OUTPATIENT)
Dept: OTOLARYNGOLOGY | Facility: CLINIC | Age: 88
End: 2024-12-20

## 2024-12-20 VITALS
DIASTOLIC BLOOD PRESSURE: 96 MMHG | HEIGHT: 55 IN | BODY MASS INDEX: 21.29 KG/M2 | WEIGHT: 92 LBS | OXYGEN SATURATION: 96 % | HEART RATE: 81 BPM | SYSTOLIC BLOOD PRESSURE: 170 MMHG

## 2024-12-20 DIAGNOSIS — R04.0 EPISTAXIS: ICD-10-CM

## 2024-12-20 PROCEDURE — 31231 NASAL ENDOSCOPY DX: CPT

## 2024-12-20 PROCEDURE — 99203 OFFICE O/P NEW LOW 30 MIN: CPT | Mod: 25

## 2024-12-20 RX ORDER — CETIRIZINE HYDROCHLORIDE 10 MG/1
10 TABLET, COATED ORAL
Refills: 0 | Status: ACTIVE | COMMUNITY

## 2024-12-20 RX ORDER — HYDROCHLOROTHIAZIDE 12.5 MG/1
TABLET ORAL
Refills: 0 | Status: ACTIVE | COMMUNITY

## 2024-12-20 RX ORDER — ESZOPICLONE 3 MG/1
TABLET, COATED ORAL
Refills: 0 | Status: ACTIVE | COMMUNITY

## 2024-12-20 RX ORDER — CHROMIUM 200 MCG
TABLET ORAL
Refills: 0 | Status: ACTIVE | COMMUNITY

## 2024-12-20 RX ORDER — SENNOSIDES 8.6 MG TABLETS 8.6 MG/1
TABLET ORAL
Refills: 0 | Status: ACTIVE | COMMUNITY

## 2024-12-20 RX ORDER — SOD CHLOR,BICARB/SQUEEZ BOTTLE
PACKET, WITH RINSE DEVICE NASAL
Qty: 100 | Refills: 1 | Status: ACTIVE | COMMUNITY
Start: 2024-12-20 | End: 1900-01-01

## 2024-12-20 RX ORDER — METOPROLOL TARTRATE 75 MG/1
TABLET, FILM COATED ORAL
Refills: 0 | Status: ACTIVE | COMMUNITY

## 2024-12-20 RX ORDER — MULTIVIT-MIN/FA/LYCOPEN/LUTEIN .4-300-25
TABLET ORAL
Refills: 0 | Status: ACTIVE | COMMUNITY

## 2024-12-20 RX ORDER — DAPAGLIFLOZIN 10 MG/1
TABLET, FILM COATED ORAL
Refills: 0 | Status: ACTIVE | COMMUNITY

## 2024-12-20 RX ORDER — OXYBUTYNIN CHLORIDE 5 MG/1
5 TABLET ORAL
Refills: 0 | Status: ACTIVE | COMMUNITY

## 2025-01-17 ENCOUNTER — APPOINTMENT (OUTPATIENT)
Dept: OTOLARYNGOLOGY | Facility: CLINIC | Age: 89
End: 2025-01-17

## 2025-06-13 NOTE — DISCHARGE NOTE PROVIDER - DISCHARGE DATE
History of stent of unknown vessel at [facility] in [place]  Subsequent cardiac catheterization April 2018 demonstrated nonobstructive coronary artery disease  Continue Plavix 75 mg daily  Continue aspirin 81 mg daily   13-Jun-2023

## 2025-07-28 NOTE — DISCHARGE NOTE PROVIDER - COLLABORATE WITH
Tikosyn Dose Initiation (Given every 12 hours for total of 5 doses = loading):    Dose #    1   QTc prior to current dose:   401   QTc now (2-3 hours after dose given):   411   Telemetry remains in place? Yes   IV access remains in place? Yes   Potassium and Magnesium remain monitored and replacements provided if needed? Yes     *This documentation should be in real time for tracking purposes.     ACP

## (undated) DEVICE — LIJ-GAMMA NAIL OPTIONAL INST TRAY: Type: DURABLE MEDICAL EQUIPMENT

## (undated) DEVICE — SOL IRR POUR H2O 1500ML

## (undated) DEVICE — SUCTION YANKAUER OPEN TIP NO VENT CURVE

## (undated) DEVICE — ELCTR GROUNDING PAD ADULT COVIDIEN

## (undated) DEVICE — PACK MAJOR ABDOMINAL WITH LAP

## (undated) DEVICE — GLV 7.5 PROTEXIS (WHITE)

## (undated) DEVICE — SUT VICRYL 2-0 27" FS-1 UNDYED

## (undated) DEVICE — LABELS BLANK W PEN

## (undated) DEVICE — DRAPE IOBAN 33" X 23"

## (undated) DEVICE — PREP CHLORAPREP HI-LITE ORANGE 26ML

## (undated) DEVICE — GLV 8 PROTEXIS (CREAM) MICRO

## (undated) DEVICE — POSITIONER STRAP ARMBOARD VELCRO TS-30

## (undated) DEVICE — ELCTR BOVIE PENCIL SMOKE EVACUATION

## (undated) DEVICE — SUT VICRYL PLUS 0 27" OS-6 UNDYED

## (undated) DEVICE — SOL IRR POUR NS 0.9% 1500ML

## (undated) DEVICE — DRSG COBAN 4"

## (undated) DEVICE — DRSG WEBRIL 4"

## (undated) DEVICE — TAPE SILK 3"

## (undated) DEVICE — VENODYNE/SCD SLEEVE CALF MEDIUM

## (undated) DEVICE — DRILL BIT STRYKER ORTHO LOKG 4.2X360MM

## (undated) DEVICE — DRSG TAPE MICROFOAM 4"